# Patient Record
Sex: MALE | Race: BLACK OR AFRICAN AMERICAN | Employment: OTHER | URBAN - METROPOLITAN AREA
[De-identification: names, ages, dates, MRNs, and addresses within clinical notes are randomized per-mention and may not be internally consistent; named-entity substitution may affect disease eponyms.]

---

## 2018-08-20 DIAGNOSIS — C61 PROSTATE CA (HCC): ICD-10-CM

## 2018-08-20 DIAGNOSIS — I70.90 ATHEROSCLEROTIC VASCULAR DISEASE: ICD-10-CM

## 2018-08-20 DIAGNOSIS — G47.33 OSA (OBSTRUCTIVE SLEEP APNEA): ICD-10-CM

## 2018-08-20 DIAGNOSIS — J61 ASBESTOSIS (HCC): ICD-10-CM

## 2018-08-20 RX ORDER — SIMVASTATIN 20 MG
20 TABLET ORAL NIGHTLY
COMMUNITY
End: 2018-08-22 | Stop reason: ALTCHOICE

## 2018-08-20 RX ORDER — LOSARTAN POTASSIUM 25 MG/1
25 TABLET ORAL DAILY
COMMUNITY

## 2018-08-20 RX ORDER — ANTIOX #8/OM3/DHA/EPA/LUT/ZEAX 250-2.5 MG
CAPSULE ORAL 2 TIMES DAILY
COMMUNITY

## 2018-08-20 RX ORDER — TROSPIUM CHLORIDE 20 MG/1
20 TABLET, FILM COATED ORAL 2 TIMES DAILY
COMMUNITY
End: 2018-08-22 | Stop reason: ALTCHOICE

## 2018-08-20 RX ORDER — MULTIVIT WITH MINERALS/LUTEIN
TABLET ORAL DAILY
COMMUNITY
End: 2018-08-22 | Stop reason: SDUPTHER

## 2018-08-20 RX ORDER — CARVEDILOL 12.5 MG/1
12.5 TABLET ORAL 2 TIMES DAILY
COMMUNITY

## 2018-08-20 RX ORDER — ASPIRIN 81 MG/1
81 TABLET ORAL DAILY
COMMUNITY
End: 2018-08-22 | Stop reason: SDUPTHER

## 2018-08-21 PROBLEM — K92.2 GI BLEEDING: Status: ACTIVE | Noted: 2018-08-21

## 2018-08-21 PROBLEM — I25.810 CAD (CORONARY ARTERY DISEASE) OF ARTERY BYPASS GRAFT: Status: ACTIVE | Noted: 2018-08-21

## 2018-08-22 ENCOUNTER — OFFICE VISIT (OUTPATIENT)
Dept: CARDIOLOGY CLINIC | Age: 83
End: 2018-08-22
Payer: MEDICARE

## 2018-08-22 VITALS
OXYGEN SATURATION: 98 % | RESPIRATION RATE: 14 BRPM | WEIGHT: 195.2 LBS | HEIGHT: 68 IN | DIASTOLIC BLOOD PRESSURE: 62 MMHG | SYSTOLIC BLOOD PRESSURE: 128 MMHG | BODY MASS INDEX: 29.58 KG/M2 | HEART RATE: 75 BPM

## 2018-08-22 DIAGNOSIS — I25.118 CORONARY ARTERY DISEASE OF NATIVE ARTERY OF NATIVE HEART WITH STABLE ANGINA PECTORIS (HCC): ICD-10-CM

## 2018-08-22 DIAGNOSIS — I25.708 CORONARY ARTERY DISEASE OF BYPASS GRAFT OF NATIVE HEART WITH STABLE ANGINA PECTORIS (HCC): Primary | ICD-10-CM

## 2018-08-22 DIAGNOSIS — R06.02 SHORTNESS OF BREATH: ICD-10-CM

## 2018-08-22 DIAGNOSIS — K92.2 GASTROINTESTINAL HEMORRHAGE, UNSPECIFIED GASTROINTESTINAL HEMORRHAGE TYPE: ICD-10-CM

## 2018-08-22 PROCEDURE — 99214 OFFICE O/P EST MOD 30 MIN: CPT | Performed by: INTERNAL MEDICINE

## 2018-08-22 RX ORDER — ATORVASTATIN CALCIUM 40 MG/1
40 TABLET, FILM COATED ORAL NIGHTLY
COMMUNITY

## 2018-08-22 RX ORDER — VARDENAFIL HYDROCHLORIDE 20 MG/1
20 TABLET ORAL
COMMUNITY
End: 2018-08-22 | Stop reason: ALTCHOICE

## 2018-08-22 RX ORDER — MECLIZINE HCL 12.5 MG/1
25 TABLET ORAL 3 TIMES DAILY
COMMUNITY

## 2018-08-22 ASSESSMENT — ENCOUNTER SYMPTOMS
ABDOMINAL PAIN: 0
EYE DISCHARGE: 0
ABDOMINAL DISTENTION: 0
BLOOD IN STOOL: 0
ANAL BLEEDING: 0
BACK PAIN: 0
COLOR CHANGE: 0
EYE PAIN: 0

## 2018-08-22 NOTE — ASSESSMENT & PLAN NOTE
Currently smokes 2 cigars per day. Alcohol: Does not drink alcohol and never has. Substance Abuse History:  No history of illicit substance use. Mental Health History:  NEGATIVE    Current Problems:  -CAD-C-11/18/02-LM stenosis, [place] to LAD patent, SVG to Intermediate,-90% stenosis, SVG to OM2-patent, RCA-20% stenosis  Anemia Post op On iron no  Asbestosis MENA neg  Bilateral Cataract surgery  Blood in stool, 3/14/07 H Pylori Dr Daisy Moser  CAD Cath 11/02 EF40%, SVG IM 90% PTCA with stent. Other grafts OK. EF Echo 50% ETT 5/09 Unchanged. CAD ETT 5/09 Lateral scar, No sig ischemia., FU 1/11 Neg , EF 56%  CAD PTCA Stent ADAN SVG HL, 1/03 LDL 2015 64  Carotid artery stenosis Mild ? Lt subc stenosis  GI Bleed 3/07 Jessi Moser  Malignant prostate nodule  Prostate cancer Dr Lakisha Moreno  Sleep apnea, CPAP Dr Mark Tejeda    Allergies:  No Known Drug Allergies. Current Medications:  Meds Reviewed  Losartan 25mg Tablets 1 tab(s) po qd  Atorvastatin Calcium 40mg Tablet 1 tab(s) po daily  Centrum Silver one po daily  stool softener  Carvedilol 12.5mg Tablets 1 po bid  Vesicare 5mg Dr Lakisha Moreno. Tab take in AM with Meal  Aspirin 81mg Chewable Tablet 1 qd  Nitroglycerin 0.4mg Tablets, Sublingual 1 tab(s) sl may repeat every 5 minutes. Maximum of 3 doses in 15 minutes    OBJECTIVE:    Exams:  PHYSICAL EXAM:  GENERAL: no apparent distress;  NECK: range of motion is normal; carotid exam is normal with good upstroke and no bruits;  RESPIRATORY: normal breath sounds with no rales, rhonchi, wheezes or rubs;  CARDIOVASCULAR: normal rate; rhythm is regular; an S4 gallop is noted; no systolic murmur;  Peripheral Pulses: carotid: 3+ L and 3+ R; femoral: bruit on left and 2+ R; posterior tibial: 1+ L and 1+ R; no edema;  NEUROLOGIC: CN, motor and sensory without apparent gross deficit. ASSESSMENT:    414.01 CAD Cath 11/02 EF40%, SVG IM 90% PTCA with stent. Other grafts OK. EF Echo 50% ETT 5/09 Unchanged.     PLAN:    CAD Cath 11/02 EF40%, 2001  Better with Meclizine. Post CABG CAD stable  Plan: # Continue all medications, Do not stop or change medications without informing our office first.  # Risk factor modification: Need to stay on low cholestrol diet, follow your Cholestrol level ( LDL Bad Cholestrol) Need to keep this below 70, Walk at least 30 minutes a day. # Caffeine , Alcohol and Smoking is particularly harmful for your heart condition. A follow up appointment is given at the time of your visit, Please keep regular appointments, All heart conditions are chronic by natures so requires routine followups. Please go to ER or call our office if any change in your symptoms . Lipids are being followed by PCP, Patient states last test was OK.

## 2018-08-22 NOTE — PROGRESS NOTES
 GI bleeding       Medications Discontinued During This Encounter   Medication Reason    aspirin 81 MG EC tablet DUPLICATE    Multiple Vitamins-Minerals (CENTRUM SILVER) TABS DUPLICATE    Mirabegron ER 25 MG TB24 Therapy completed    simvastatin (ZOCOR) 20 MG tablet Alternate therapy    trospium (SANCTURA) 20 MG tablet Therapy completed    vardenafil (LEVITRA) 20 MG tablet Therapy completed       Modified Medications    No medications on file       No orders of the defined types were placed in this encounter. Assessment/Plan:     Diagnosis Orders   1. Shortness of breath     2. Coronary artery disease of native artery of native heart with stable angina pectoris ([facility] [address]     3. Coronary artery disease of bypass graft of native heart with stable angina pectoris ([facility] [address]     4. Gastrointestinal hemorrhage, unspecified gastrointestinal hemorrhage type        RODGERS possible angina equivalent Known CAD will do stress test.  Coronary anatomy and symptoms related to CAD /Atherosclerosis were discussed in detail. Patient expressed understanding of these issues. Risk factors for atherosclerosis and modification explained in detail. Laboratory data explained. Importance of heart healthy diet discussed again. Daily walk for 30 minutes or 180 minutes a week strongly recommended. Patient must report any change in functional capacity as explained. Palpitation if any must sit down , check BP and HR , if any associated symptoms or symptom persist must go to the ER. Use of nitrostat explained. Patient must maintain close follow up with PCP and discuss further cardiac and non cardiac issues with PCP. Patient must maintain regular and PRN follow up in our clinic. [unfilled] /family was allowed adequate time to ask questions. No Follow-up on file.       Electronically signed by Oliva Coreas MD on 8/22/2018 at 10:32 AM

## 2018-08-31 ENCOUNTER — HOSPITAL ENCOUNTER (OUTPATIENT)
Dept: NON INVASIVE DIAGNOSTICS | Age: 83
Discharge: HOME | End: 2018-08-31
Payer: MEDICARE

## 2018-08-31 ENCOUNTER — HOSPITAL ENCOUNTER (OUTPATIENT)
Dept: NUCLEAR MEDICINE | Age: 83
Discharge: HOME | End: 2018-09-02
Payer: MEDICARE

## 2018-08-31 VITALS — DIASTOLIC BLOOD PRESSURE: 78 MMHG | HEART RATE: 109 BPM | SYSTOLIC BLOOD PRESSURE: 122 MMHG

## 2018-08-31 DIAGNOSIS — R06.02 SHORTNESS OF BREATH: ICD-10-CM

## 2018-08-31 DIAGNOSIS — I25.118 CORONARY ARTERY DISEASE OF NATIVE ARTERY OF NATIVE HEART WITH STABLE ANGINA PECTORIS (HCC): ICD-10-CM

## 2018-08-31 DIAGNOSIS — I25.708 CORONARY ARTERY DISEASE OF BYPASS GRAFT OF NATIVE HEART WITH STABLE ANGINA PECTORIS (HCC): ICD-10-CM

## 2018-08-31 PROCEDURE — A9502 TC99M TETROFOSMIN: HCPCS | Performed by: INTERNAL MEDICINE

## 2018-08-31 PROCEDURE — 3430000000 HC RX DIAGNOSTIC RADIOPHARMACEUTICAL: Performed by: INTERNAL MEDICINE

## 2018-08-31 PROCEDURE — 6360000004 HC RX CONTRAST MEDICATION: Performed by: INTERNAL MEDICINE

## 2018-08-31 PROCEDURE — 93017 CV STRESS TEST TRACING ONLY: CPT

## 2018-08-31 PROCEDURE — 78452 HT MUSCLE IMAGE SPECT MULT: CPT

## 2018-08-31 PROCEDURE — 2580000003 HC RX 258: Performed by: INTERNAL MEDICINE

## 2018-08-31 RX ORDER — SODIUM CHLORIDE 0.9 % (FLUSH) 0.9 %
10 SYRINGE (ML) INJECTION PRN
Status: DISCONTINUED | OUTPATIENT
Start: 2018-08-31 | End: 2018-09-03 | Stop reason: HOSPADM

## 2018-08-31 RX ORDER — SODIUM CHLORIDE 0.9 % (FLUSH) 0.9 %
10 SYRINGE (ML) INJECTION 2 TIMES DAILY
Status: DISCONTINUED | OUTPATIENT
Start: 2018-08-31 | End: 2018-09-03 | Stop reason: HOSPADM

## 2018-08-31 RX ADMIN — TETROFOSMIN 11.1 MILLICURIE: 0.23 INJECTION, POWDER, LYOPHILIZED, FOR SOLUTION INTRAVENOUS at 08:36

## 2018-08-31 RX ADMIN — Medication 10 ML: at 10:19

## 2018-08-31 RX ADMIN — Medication 10 ML: at 10:17

## 2018-08-31 RX ADMIN — REGADENOSON 0.4 MG: 0.08 INJECTION, SOLUTION INTRAVENOUS at 10:16

## 2018-08-31 RX ADMIN — TETROFOSMIN 31.1 MILLICURIE: 0.23 INJECTION, POWDER, LYOPHILIZED, FOR SOLUTION INTRAVENOUS at 10:18

## 2018-08-31 RX ADMIN — Medication 10 ML: at 08:37

## 2018-11-21 ENCOUNTER — OFFICE VISIT (OUTPATIENT)
Dept: CARDIOLOGY CLINIC | Age: 83
End: 2018-11-21
Payer: MEDICARE

## 2018-11-21 VITALS
BODY MASS INDEX: 29.61 KG/M2 | SYSTOLIC BLOOD PRESSURE: 130 MMHG | HEART RATE: 74 BPM | HEIGHT: 68 IN | DIASTOLIC BLOOD PRESSURE: 70 MMHG | WEIGHT: 195.4 LBS

## 2018-11-21 DIAGNOSIS — R06.02 SHORTNESS OF BREATH: ICD-10-CM

## 2018-11-21 DIAGNOSIS — I25.10 CORONARY ARTERY DISEASE INVOLVING NATIVE CORONARY ARTERY OF NATIVE HEART, ANGINA PRESENCE UNSPECIFIED: Primary | ICD-10-CM

## 2018-11-21 DIAGNOSIS — I25.709 CORONARY ARTERY DISEASE INVOLVING CORONARY BYPASS GRAFT OF NATIVE HEART WITH ANGINA PECTORIS (HCC): ICD-10-CM

## 2018-11-21 PROCEDURE — 93000 ELECTROCARDIOGRAM COMPLETE: CPT | Performed by: INTERNAL MEDICINE

## 2018-11-21 PROCEDURE — 99213 OFFICE O/P EST LOW 20 MIN: CPT | Performed by: INTERNAL MEDICINE

## 2018-11-21 ASSESSMENT — ENCOUNTER SYMPTOMS
EYE DISCHARGE: 0
BLOOD IN STOOL: 0
COLOR CHANGE: 0
EYE PAIN: 0
ANAL BLEEDING: 0
ABDOMINAL PAIN: 0
BACK PAIN: 0
ABDOMINAL DISTENTION: 0

## 2018-11-21 NOTE — PROGRESS NOTES
CREATININE, GFRAA, AGRATIO, LABGLOM, GLUCOSE, PROT, LABALBU, CALCIUM, BILITOT, ALKPHOS, AST, ALT  BMP:  No results found for: NA, K, CL, CO2, BUN, LABALBU, CREATININE, CALCIUM, GFRAA, LABGLOM, GLUCOSE  Magnesium:  No results found for: MG  TSH:No results found for: TSHFT4, TSH      Patient Active Problem List   Diagnosis    CAD (coronary artery disease)    Atherosclerotic vascular disease    Asbestosis ([facility] [address]    Prostate CA ([facility]    RADHA (obstructive sleep apnea)    CAD (coronary artery disease) of artery bypass graft    GI bleeding       Assessment/Plan:    1. Coronary artery disease involving native coronary artery of native heart, angina presence unspecified    - EKG 12 Lead      Counseling:  Coronary anatomy and symptoms related to CAD /Atherosclerosis were discussed in detail. Patient expressed understanding of these issues. Risk factors for atherosclerosis and modification explained in detail. Laboratory data explained. Importance of heart healthy diet discussed again. Daily walk for 30 minutes or 180 minutes a week strongly recommended. Patient must report any change in functional capacity as explained. Palpitation if any must sit down , check BP and HR , if any associated symptoms or symptom persist must go to the ER. Use of nitrostat explained. Patient must maintain close follow up with PCP and discuss further cardiac and non cardiac issues with PCP. Patient must maintain regular and PRN follow up in our clinic. Zulema Prim /family was allowed adequate time to ask questions. Return in about 5 months (around 4/21/2019).     Electronically signed by Nusrat Neal MD on 11/21/2018 at 11:11 AM

## 2019-04-22 ENCOUNTER — OFFICE VISIT (OUTPATIENT)
Dept: CARDIOLOGY CLINIC | Age: 84
End: 2019-04-22
Payer: MEDICARE

## 2019-04-22 VITALS
WEIGHT: 195 LBS | OXYGEN SATURATION: 97 % | DIASTOLIC BLOOD PRESSURE: 80 MMHG | HEIGHT: 68 IN | BODY MASS INDEX: 29.55 KG/M2 | SYSTOLIC BLOOD PRESSURE: 130 MMHG | HEART RATE: 80 BPM

## 2019-04-22 DIAGNOSIS — R06.02 SHORTNESS OF BREATH: ICD-10-CM

## 2019-04-22 DIAGNOSIS — I25.10 CORONARY ARTERY DISEASE INVOLVING NATIVE CORONARY ARTERY OF NATIVE HEART, ANGINA PRESENCE UNSPECIFIED: Primary | ICD-10-CM

## 2019-04-22 DIAGNOSIS — G47.33 OSA (OBSTRUCTIVE SLEEP APNEA): ICD-10-CM

## 2019-04-22 PROCEDURE — 99213 OFFICE O/P EST LOW 20 MIN: CPT | Performed by: INTERNAL MEDICINE

## 2019-04-22 ASSESSMENT — ENCOUNTER SYMPTOMS
ANAL BLEEDING: 1
SHORTNESS OF BREATH: 1
APNEA: 0
CHEST TIGHTNESS: 0

## 2019-04-22 NOTE — PROGRESS NOTES
attempt to quit: 1998     Years since quittin.6    Smokeless tobacco: Never Used   Substance and Sexual Activity    Alcohol use: No    Drug use: Not on file    Sexual activity: Not on file   Lifestyle    Physical activity:     Days per week: Not on file     Minutes per session: Not on file    Stress: Not on file   Relationships    Social connections:     Talks on phone: Not on file     Gets together: Not on file     Attends Faith service: Not on file     Active member of club or organization: Not on file     Attends meetings of clubs or organizations: Not on file     Relationship status: Not on file    Intimate partner violence:     Fear of current or ex partner: Not on file     Emotionally abused: Not on file     Physically abused: Not on file     Forced sexual activity: Not on file   Other Topics Concern    Not on file   Social History Narrative    Not on file       No Known Allergies    Current Outpatient Medications   Medication Sig Dispense Refill    aspirin 81 MG tablet Take 81 mg by mouth daily      atorvastatin (LIPITOR) 40 MG tablet Take 40 mg by mouth nightly      meclizine (ANTIVERT) 12.5 MG tablet Take 25 mg by mouth 3 times daily      Multiple Vitamins-Minerals (CENTRUM SILVER PO) Take 1 tablet by mouth      carvedilol (COREG) 12.5 MG tablet Take 12.5 mg by mouth 2 times daily      losartan (COZAAR) 25 MG tablet Take 25 mg by mouth daily      Multiple Vitamins-Minerals (PRESERVISION AREDS 2) CAPS Take by mouth 2 times daily       No current facility-administered medications for this visit. Review of Systems:   Review of Systems   Constitutional: Negative for appetite change, diaphoresis and fatigue. HENT: Negative. Respiratory: Positive for shortness of breath (if rushing). Negative for apnea and chest tightness. Cardiovascular: Negative for chest pain, palpitations and leg swelling. Gastrointestinal: Positive for anal bleeding (gi consult pending. ). Genitourinary: Positive for enuresis (spirnak). Musculoskeletal: Positive for arthralgias (knee problem, possible rt knee Dr Dax Truong). Skin: Negative. Neurological: Negative for dizziness, syncope, weakness, light-headedness and headaches. Psychiatric/Behavioral: Negative for agitation, behavioral problems, confusion and decreased concentration. The patient is not nervous/anxious and is not hyperactive. All other systems reviewed and are negative. Physical Examination:    /80 (Site: Left Upper Arm, Position: Sitting, Cuff Size: Large Adult)   Pulse 80   Ht 5' 8\" (1.727 m)   Wt 195 lb (88.5 kg)   SpO2 97%   BMI 29.65 kg/m²    Physical Exam   Constitutional: He appears healthy. No distress. HENT:   Nose: Nose normal. No nasal discharge. Eyes: Pupils are equal, round, and reactive to light. Conjunctivae are normal.   Neck: Normal range of motion and thyroid normal. Neck supple. No JVD present. No neck adenopathy. No thyromegaly present. Cardiovascular: Normal rate, regular rhythm, S1 normal, S2 normal and intact distal pulses. Occasional extrasystoles are present. PMI is not displaced. Exam reveals no gallop, no S4, no distant heart sounds, no friction rub and no midsystolic click. No murmur heard. Pulses:       Carotid pulses are 2+ on the right side, and 2+ on the left side. Posterior tibial pulses are 1+ on the right side, and 1+ on the left side. Pulmonary/Chest: Effort normal and breath sounds normal. He exhibits no tenderness. Abdominal: Soft. There is no tenderness. Musculoskeletal: He exhibits no tenderness or deformity. Neurological: He is alert and oriented to person, place, and time. He has normal motor skills and intact cranial nerves. Skin: No cyanosis. No jaundice or plethora. Nails show no clubbing.            Patient Active Problem List   Diagnosis    CAD (coronary artery disease)    Atherosclerotic vascular disease    Asbestosis ([facility] [address]    Prostate CA ([facility] [address]    RADHA (obstructive sleep apnea)    CAD (coronary artery disease) of artery bypass graft    GI bleeding         No orders of the defined types were placed in this encounter. Assessment/Plan:    1. Coronary artery disease involving native coronary artery of native heart, angina presence unspecified  Post CABG 2002    2. RADHA (obstructive sleep apnea)  CPAP Dr Kati Sanchez    3. Shortness of breath         Counseling:  Coronary anatomy and symptoms related to CAD /Atherosclerosis were discussed in detail. Patient expressed understanding of these issues. Risk factors for atherosclerosis and modification explained in detail. Laboratory data explained. Importance of heart healthy diet discussed again. Daily walk for 30 minutes or 180 minutes a week strongly recommended. Patient must report any change in functional capacity as explained. Palpitation if any must sit down , check BP and HR , if any associated symptoms or symptom persist must go to the ER. Use of nitrostat explained. Patient must maintain close follow up with PCP and discuss further cardiac and non cardiac issues with PCP. Patient must maintain regular and PRN follow up in our clinic. [place] Ekaterina /family was allowed adequate time to ask questions. Return in about 6 months (around 10/22/2019).   Electronically signed by Jose Thomas MD on 4/22/2019 at 10:44 AM          (Please note that portions of this note were completed with a voice recognition program.  Efforts were made to edit the dictations but occasionally words are mis-transcribed.)

## 2019-10-25 ENCOUNTER — OFFICE VISIT (OUTPATIENT)
Dept: CARDIOLOGY CLINIC | Age: 84
End: 2019-10-25
Payer: MEDICARE

## 2019-10-25 VITALS
WEIGHT: 197 LBS | DIASTOLIC BLOOD PRESSURE: 66 MMHG | SYSTOLIC BLOOD PRESSURE: 112 MMHG | OXYGEN SATURATION: 99 % | HEART RATE: 72 BPM | RESPIRATION RATE: 16 BRPM | BODY MASS INDEX: 29.95 KG/M2

## 2019-10-25 DIAGNOSIS — I25.10 CORONARY ARTERY DISEASE INVOLVING NATIVE CORONARY ARTERY OF NATIVE HEART, ANGINA PRESENCE UNSPECIFIED: Primary | ICD-10-CM

## 2019-10-25 DIAGNOSIS — I70.90 ATHEROSCLEROTIC VASCULAR DISEASE: ICD-10-CM

## 2019-10-25 PROCEDURE — 99214 OFFICE O/P EST MOD 30 MIN: CPT | Performed by: INTERNAL MEDICINE

## 2019-10-25 ASSESSMENT — ENCOUNTER SYMPTOMS
SHORTNESS OF BREATH: 0
VOMITING: 0
NAUSEA: 0
APNEA: 0
ABDOMINAL DISTENTION: 0
COLOR CHANGE: 0
DIARRHEA: 0
CHEST TIGHTNESS: 0

## 2020-01-27 ENCOUNTER — TELEPHONE (OUTPATIENT)
Dept: CARDIOLOGY CLINIC | Age: 85
End: 2020-01-27

## 2020-01-28 RX ORDER — NITROGLYCERIN 0.4 MG/1
0.4 TABLET SUBLINGUAL EVERY 5 MIN PRN
COMMUNITY
End: 2020-01-28 | Stop reason: SDUPTHER

## 2020-01-28 RX ORDER — NITROGLYCERIN 0.4 MG/1
0.4 TABLET SUBLINGUAL EVERY 5 MIN PRN
Qty: 25 TABLET | Refills: 1 | Status: SHIPPED | OUTPATIENT
Start: 2020-01-28 | End: 2020-02-21

## 2020-02-21 RX ORDER — NITROGLYCERIN 0.4 MG/1
TABLET SUBLINGUAL
Qty: 25 TABLET | Refills: 3 | Status: SHIPPED | OUTPATIENT
Start: 2020-02-21

## 2021-06-23 NOTE — TELEPHONE ENCOUNTER
requesting medication refill. Please approve or deny this request.    Rx requested:  Requested Prescriptions      No prescriptions requested or ordered in this encounter     Nitro. .. Last Office Visit:   10/25/2019      Next Visit Date:  No future appointments.

## 2023-02-14 PROBLEM — E78.2 MIXED HYPERLIPIDEMIA: Status: ACTIVE | Noted: 2023-02-14

## 2023-02-14 PROBLEM — M25.569 JOINT PAIN, KNEE: Status: ACTIVE | Noted: 2023-02-14

## 2023-02-14 PROBLEM — D47.2 MGUS (MONOCLONAL GAMMOPATHY OF UNKNOWN SIGNIFICANCE): Status: ACTIVE | Noted: 2023-02-14

## 2023-02-14 PROBLEM — I25.10 ARTERIOSCLEROTIC CARDIOVASCULAR DISEASE: Status: ACTIVE | Noted: 2023-02-14

## 2023-02-14 PROBLEM — Z96.651 STATUS POST TOTAL RIGHT KNEE REPLACEMENT: Status: ACTIVE | Noted: 2023-02-14

## 2023-02-14 PROBLEM — J61 ASBESTOSIS (MULTI): Status: ACTIVE | Noted: 2023-02-14

## 2023-02-14 PROBLEM — I10 BENIGN ESSENTIAL HYPERTENSION: Status: ACTIVE | Noted: 2023-02-14

## 2023-02-14 PROBLEM — C88.4: Status: ACTIVE | Noted: 2023-02-14

## 2023-02-14 PROBLEM — D64.9 ANEMIA: Status: ACTIVE | Noted: 2023-02-14

## 2023-02-14 PROBLEM — I49.3 FREQUENT PVCS: Status: ACTIVE | Noted: 2023-02-14

## 2023-02-14 PROBLEM — Z95.1 S/P CABG X 3: Status: ACTIVE | Noted: 2023-02-14

## 2023-02-14 PROBLEM — C61 MALIGNANT NEOPLASM OF PROSTATE (MULTI): Status: ACTIVE | Noted: 2023-02-14

## 2023-02-14 PROBLEM — S61.213D: Status: ACTIVE | Noted: 2023-02-14

## 2023-02-14 PROBLEM — N18.31 STAGE 3A CHRONIC KIDNEY DISEASE (MULTI): Status: ACTIVE | Noted: 2023-02-14

## 2023-02-14 PROBLEM — C88.40 MALT (MUCOSA ASSOCIATED LYMPHOID TISSUE): Status: ACTIVE | Noted: 2023-02-14

## 2023-02-14 PROBLEM — G47.33 OBSTRUCTIVE SLEEP APNEA SYNDROME: Status: ACTIVE | Noted: 2023-02-14

## 2023-02-14 RX ORDER — CARVEDILOL 12.5 MG/1
1 TABLET ORAL 2 TIMES DAILY
COMMUNITY
Start: 2019-05-01 | End: 2023-04-27 | Stop reason: SDUPTHER

## 2023-02-14 RX ORDER — LOSARTAN POTASSIUM 25 MG/1
1 TABLET ORAL DAILY
COMMUNITY
Start: 2019-05-01 | End: 2023-05-08 | Stop reason: SDUPTHER

## 2023-02-14 RX ORDER — ATORVASTATIN CALCIUM 40 MG/1
1 TABLET, FILM COATED ORAL NIGHTLY
COMMUNITY
Start: 2019-05-01 | End: 2024-01-16 | Stop reason: SDUPTHER

## 2023-02-14 RX ORDER — NITROGLYCERIN 0.4 MG/1
0.4 TABLET SUBLINGUAL EVERY 5 MIN PRN
COMMUNITY
Start: 2019-05-01

## 2023-02-14 RX ORDER — MIRABEGRON 25 MG/1
25 TABLET, FILM COATED, EXTENDED RELEASE ORAL DAILY
COMMUNITY
Start: 2019-05-01 | End: 2024-05-06 | Stop reason: SDUPTHER

## 2023-02-14 RX ORDER — ASPIRIN 81 MG/1
81 TABLET ORAL DAILY
COMMUNITY
Start: 2019-05-01

## 2023-02-14 RX ORDER — MECLIZINE HCL 12.5 MG 12.5 MG/1
1 TABLET ORAL 3 TIMES DAILY PRN
COMMUNITY
Start: 2019-05-01

## 2023-03-09 ENCOUNTER — OFFICE VISIT (OUTPATIENT)
Dept: PRIMARY CARE | Facility: CLINIC | Age: 88
End: 2023-03-09
Payer: MEDICARE

## 2023-03-09 VITALS
WEIGHT: 179 LBS | TEMPERATURE: 96 F | HEIGHT: 67 IN | HEART RATE: 56 BPM | DIASTOLIC BLOOD PRESSURE: 78 MMHG | SYSTOLIC BLOOD PRESSURE: 113 MMHG | BODY MASS INDEX: 28.09 KG/M2

## 2023-03-09 DIAGNOSIS — I50.21 ACUTE SYSTOLIC CONGESTIVE HEART FAILURE (MULTI): Primary | ICD-10-CM

## 2023-03-09 DIAGNOSIS — I25.10 ARTERIOSCLEROTIC CARDIOVASCULAR DISEASE: ICD-10-CM

## 2023-03-09 DIAGNOSIS — I10 BENIGN ESSENTIAL HYPERTENSION: ICD-10-CM

## 2023-03-09 PROBLEM — Z96.651 STATUS POST TOTAL RIGHT KNEE REPLACEMENT: Status: RESOLVED | Noted: 2023-02-14 | Resolved: 2023-03-09

## 2023-03-09 PROBLEM — M25.569 JOINT PAIN, KNEE: Status: RESOLVED | Noted: 2023-02-14 | Resolved: 2023-03-09

## 2023-03-09 PROBLEM — I49.3 FREQUENT PVCS: Status: RESOLVED | Noted: 2023-02-14 | Resolved: 2023-03-09

## 2023-03-09 PROBLEM — S61.213D: Status: RESOLVED | Noted: 2023-02-14 | Resolved: 2023-03-09

## 2023-03-09 PROBLEM — D64.9 ANEMIA: Status: RESOLVED | Noted: 2023-02-14 | Resolved: 2023-03-09

## 2023-03-09 PROCEDURE — 3078F DIAST BP <80 MM HG: CPT | Performed by: INTERNAL MEDICINE

## 2023-03-09 PROCEDURE — 1160F RVW MEDS BY RX/DR IN RCRD: CPT | Performed by: INTERNAL MEDICINE

## 2023-03-09 PROCEDURE — 1159F MED LIST DOCD IN RCRD: CPT | Performed by: INTERNAL MEDICINE

## 2023-03-09 PROCEDURE — 1157F ADVNC CARE PLAN IN RCRD: CPT | Performed by: INTERNAL MEDICINE

## 2023-03-09 PROCEDURE — 99213 OFFICE O/P EST LOW 20 MIN: CPT | Performed by: INTERNAL MEDICINE

## 2023-03-09 PROCEDURE — 3074F SYST BP LT 130 MM HG: CPT | Performed by: INTERNAL MEDICINE

## 2023-03-09 PROCEDURE — 1036F TOBACCO NON-USER: CPT | Performed by: INTERNAL MEDICINE

## 2023-03-09 RX ORDER — FUROSEMIDE 40 MG/1
1 TABLET ORAL DAILY
Qty: 30 TABLET | Refills: 2 | COMMUNITY
Start: 2023-03-01 | End: 2023-03-27 | Stop reason: SDUPTHER

## 2023-03-09 ASSESSMENT — ENCOUNTER SYMPTOMS
SHORTNESS OF BREATH: 1
FATIGUE: 1
UNEXPECTED WEIGHT CHANGE: 0
ABDOMINAL PAIN: 0
CHEST PRESSURE: 0
PALPITATIONS: 0
EDEMA: 0
ORTHOPNEA: 0

## 2023-03-09 NOTE — PROGRESS NOTES
"Subjective   Patient ID: Brian Lennon Jr. is a 87 y.o. male who presents for Follow-up (Hospital follow up).    Patient was found to have a systolic heart failure, hospitalization data were reviewed, ejection fraction was 30%, patient has been started on Lasix therapy.  He has felt better but this is a new diagnosis, patient has history of CABG, patient has history of coronary artery disease.  Patient is a survivor of prostate cancer, echocardiography, laboratories, BNP were reviewed.    Congestive Heart Failure  Presents for follow-up visit. Associated symptoms include fatigue and shortness of breath. Pertinent negatives include no abdominal pain, chest pain, chest pressure, edema, orthopnea, palpitations, paroxysmal nocturnal dyspnea or unexpected weight change. The symptoms have been stable. Compliance with total regimen is %. Compliance problems include adherence to diet.  Compliance with diet is %.   Atherosclerotic Disease  Patient is a 87 y.o. male who presents for follow-up of atherosclerotic coronary artery disease. Recent history: taking medications as instructed, no medication side effects noted, no TIA's, no chest pain on exertion, no dyspnea on exertion, and no swelling of ankles. Patient's symptoms have been stable. Medication side effects include: none.    The following portions of the chart were reviewed this encounter and updated as appropriate:  Tobacco  Allergies  Meds  Problems  Med Hx  Surg Hx  Fam Hx          Review of Systems   Constitutional:  Positive for fatigue. Negative for unexpected weight change.   Respiratory:  Positive for shortness of breath.    Cardiovascular:  Negative for chest pain and palpitations.   Gastrointestinal:  Negative for abdominal pain.       Objective   /78 (BP Location: Right arm, Patient Position: Sitting, BP Cuff Size: Adult)   Pulse 56   Temp 35.6 °C (96 °F) (Temporal)   Ht 1.689 m (5' 6.5\")   Wt 81.2 kg (179 lb)   BMI 28.46 kg/m² "     Physical Exam  Vitals reviewed.   Constitutional:       Appearance: Normal appearance. He is normal weight.   HENT:      Head: Normocephalic and atraumatic.   Eyes:      Conjunctiva/sclera: Conjunctivae normal.   Cardiovascular:      Rate and Rhythm: Normal rate and regular rhythm.      Pulses: Normal pulses.   Pulmonary:      Effort: Pulmonary effort is normal.      Breath sounds: Normal breath sounds.   Abdominal:      Palpations: Abdomen is soft.   Musculoskeletal:         General: Normal range of motion.      Cervical back: Normal range of motion.   Skin:     General: Skin is warm and dry.         Assessment/Plan   Problem List Items Addressed This Visit          Circulatory    Arteriosclerotic cardiovascular disease    Benign essential hypertension     Other Visit Diagnoses       Acute systolic congestive heart failure (CMS/HCC)    -  Primary          Pt has CHF, diastolic or systolic were specified, usually three times  a week weight monitoring, salt restriction up to 2 GM Na diet, fluid restriction and continuation of therapy as recommended to be continued. HOB can be kept somewhat elevated at night. Any dyspnea or more then 5 lb weight gain or leg swelling need to be notified, please call if any of above sympts happen and pt will be addressed if possible on outpatient setting. Light exercises are always beneficial with fresh air and deep breathing exercises. Please follow up with us as directed.  Pt does not have any angina lately, aware of using NTG if needed, aware to notify MD if any new chest pains happens. Compliance is appropriate. Statin therapy reviewed,   Recent hospitalization data and information reviewed, all reports, labs, radiological investigations and consultations and follow ups reviewed during this visit. Pt is doing well, precautions to be taken in the future for acute ailments or acute illness and change of condition are discussed, will continue to have close follow up, medication  list was reviewed and updated and necessary changes were applied.

## 2023-03-27 ENCOUNTER — OFFICE VISIT (OUTPATIENT)
Dept: PRIMARY CARE | Facility: CLINIC | Age: 88
End: 2023-03-27
Payer: MEDICARE

## 2023-03-27 VITALS
WEIGHT: 179.2 LBS | TEMPERATURE: 96.8 F | SYSTOLIC BLOOD PRESSURE: 120 MMHG | HEART RATE: 69 BPM | DIASTOLIC BLOOD PRESSURE: 80 MMHG | HEIGHT: 66 IN | BODY MASS INDEX: 28.8 KG/M2

## 2023-03-27 DIAGNOSIS — J61 ASBESTOSIS (MULTI): ICD-10-CM

## 2023-03-27 DIAGNOSIS — Z00.00 ROUTINE GENERAL MEDICAL EXAMINATION AT HEALTH CARE FACILITY: Primary | ICD-10-CM

## 2023-03-27 DIAGNOSIS — I50.21 ACUTE SYSTOLIC CONGESTIVE HEART FAILURE (MULTI): ICD-10-CM

## 2023-03-27 PROCEDURE — 1170F FXNL STATUS ASSESSED: CPT | Performed by: INTERNAL MEDICINE

## 2023-03-27 PROCEDURE — 3079F DIAST BP 80-89 MM HG: CPT | Performed by: INTERNAL MEDICINE

## 2023-03-27 PROCEDURE — 99397 PER PM REEVAL EST PAT 65+ YR: CPT | Performed by: INTERNAL MEDICINE

## 2023-03-27 PROCEDURE — 1036F TOBACCO NON-USER: CPT | Performed by: INTERNAL MEDICINE

## 2023-03-27 PROCEDURE — 1159F MED LIST DOCD IN RCRD: CPT | Performed by: INTERNAL MEDICINE

## 2023-03-27 PROCEDURE — 3074F SYST BP LT 130 MM HG: CPT | Performed by: INTERNAL MEDICINE

## 2023-03-27 PROCEDURE — 1157F ADVNC CARE PLAN IN RCRD: CPT | Performed by: INTERNAL MEDICINE

## 2023-03-27 PROCEDURE — G0439 PPPS, SUBSEQ VISIT: HCPCS | Performed by: INTERNAL MEDICINE

## 2023-03-27 PROCEDURE — 1160F RVW MEDS BY RX/DR IN RCRD: CPT | Performed by: INTERNAL MEDICINE

## 2023-03-27 RX ORDER — FUROSEMIDE 40 MG/1
40 TABLET ORAL DAILY
Qty: 90 TABLET | Refills: 1 | Status: SHIPPED | OUTPATIENT
Start: 2023-03-27 | End: 2023-06-13 | Stop reason: SDUPTHER

## 2023-03-27 ASSESSMENT — ACTIVITIES OF DAILY LIVING (ADL)
DRESSING: INDEPENDENT
MANAGING_FINANCES: INDEPENDENT
TAKING_MEDICATION: INDEPENDENT
GROCERY_SHOPPING: INDEPENDENT
DOING_HOUSEWORK: INDEPENDENT
BATHING: INDEPENDENT

## 2023-03-27 ASSESSMENT — ENCOUNTER SYMPTOMS
PSYCHIATRIC NEGATIVE: 1
NECK PAIN: 0
GASTROINTESTINAL NEGATIVE: 1
BACK PAIN: 1
ARTHRALGIAS: 1
RESPIRATORY NEGATIVE: 1
DEPRESSION: 0
CARDIOVASCULAR NEGATIVE: 1
CONSTITUTIONAL NEGATIVE: 1
DIZZINESS: 1
LOSS OF SENSATION IN FEET: 0
OCCASIONAL FEELINGS OF UNSTEADINESS: 0
EYES NEGATIVE: 1

## 2023-03-27 NOTE — PROGRESS NOTES
"Subjective   Reason for Visit: Brian Lennon Jr. is an 87 y.o. male here for a Medicare Wellness visit.     Past Medical, Surgical, and Family History reviewed and updated in chart.    Reviewed all medications by prescribing practitioner or clinical pharmacist (such as prescriptions, OTCs, herbal therapies and supplements) and documented in the medical record.    Wellness exam was completed, he continued to have a pain and discomfort in the legs, he has a bilateral knee replacement, we have done plain radiograph in the past, as expected spondylosis and degenerative changes has been seen, does not have any more weight gain, he has a systolic impairment, he stays on Lasix as needed, he does not have any increasing shortness of breath, he does not have any increasing nocturnal dyspnea.  Patient has been followed by urology on a regular basis, medications are reviewed, cardiology follow-up was reviewed, wellness exam related questions were asked and addressed.        Patient Care Team:  Jason Rodriguez MD as PCP - General     Review of Systems   Constitutional: Negative.    HENT: Negative.     Eyes: Negative.    Respiratory: Negative.     Cardiovascular: Negative.    Gastrointestinal: Negative.    Musculoskeletal:  Positive for arthralgias, back pain and gait problem. Negative for neck pain.   Skin: Negative.    Neurological:  Positive for dizziness.   Psychiatric/Behavioral: Negative.         Objective   Vitals:  /80 (BP Location: Right arm, Patient Position: Sitting, BP Cuff Size: Adult)   Pulse 69   Temp 36 °C (96.8 °F) (Temporal)   Ht 1.681 m (5' 6.2\")   Wt 81.3 kg (179 lb 3.2 oz)   BMI 28.75 kg/m²       Physical Exam  Constitutional:       Appearance: Normal appearance. He is normal weight.   HENT:      Head: Normocephalic.   Eyes:      Conjunctiva/sclera: Conjunctivae normal.   Cardiovascular:      Rate and Rhythm: Normal rate and regular rhythm.      Pulses: Normal pulses.      Heart sounds: Normal " heart sounds.   Pulmonary:      Effort: Pulmonary effort is normal.      Breath sounds: Normal breath sounds.   Abdominal:      General: Abdomen is flat.      Palpations: Abdomen is soft.   Musculoskeletal:         General: Normal range of motion.      Cervical back: Normal range of motion and neck supple.   Skin:     General: Skin is warm and dry.   Neurological:      General: No focal deficit present.      Mental Status: Mental status is at baseline.   Psychiatric:         Mood and Affect: Mood normal.         Judgment: Judgment normal.         Assessment/Plan   Problem List Items Addressed This Visit          Respiratory    Asbestosis (CMS/[facility])       Other    Routine general medical examination at health care facility - Primary   Asbestosis is a longstanding diagnosis this patient has been manifesting.  He is doing well, after having that hospitalization he did not have any more weight gain or shortness of breath, his vaccinations are usually up to date, at the age of 87 he is slowing down, he remains on therapeutics for congestive heart failure, hypertension, coronary artery disease, he has history of CABG.  No major concern, salt restriction needs to be kept, medications will not be disturbed, follow-up in 3 months, wellness exam was completed, living will is up to date.

## 2023-04-11 DIAGNOSIS — M25.562 CHRONIC PAIN OF BOTH KNEES: ICD-10-CM

## 2023-04-11 DIAGNOSIS — G89.29 CHRONIC PAIN OF BOTH KNEES: ICD-10-CM

## 2023-04-11 DIAGNOSIS — M25.561 CHRONIC PAIN OF BOTH KNEES: ICD-10-CM

## 2023-04-11 DIAGNOSIS — M54.9 BACK PAIN, UNSPECIFIED BACK LOCATION, UNSPECIFIED BACK PAIN LATERALITY, UNSPECIFIED CHRONICITY: ICD-10-CM

## 2023-04-27 ENCOUNTER — OFFICE VISIT (OUTPATIENT)
Dept: PRIMARY CARE | Facility: CLINIC | Age: 88
End: 2023-04-27
Payer: MEDICARE

## 2023-04-27 VITALS
HEIGHT: 69 IN | TEMPERATURE: 96.8 F | SYSTOLIC BLOOD PRESSURE: 115 MMHG | BODY MASS INDEX: 26.36 KG/M2 | WEIGHT: 178 LBS | DIASTOLIC BLOOD PRESSURE: 78 MMHG | HEART RATE: 65 BPM

## 2023-04-27 DIAGNOSIS — M48.061 LUMBAR FORAMINAL STENOSIS: Primary | ICD-10-CM

## 2023-04-27 DIAGNOSIS — I10 BENIGN ESSENTIAL HYPERTENSION: ICD-10-CM

## 2023-04-27 DIAGNOSIS — R04.2 HEMOPTYSIS: ICD-10-CM

## 2023-04-27 PROCEDURE — 1036F TOBACCO NON-USER: CPT | Performed by: INTERNAL MEDICINE

## 2023-04-27 PROCEDURE — 1157F ADVNC CARE PLAN IN RCRD: CPT | Performed by: INTERNAL MEDICINE

## 2023-04-27 PROCEDURE — 3078F DIAST BP <80 MM HG: CPT | Performed by: INTERNAL MEDICINE

## 2023-04-27 PROCEDURE — 1160F RVW MEDS BY RX/DR IN RCRD: CPT | Performed by: INTERNAL MEDICINE

## 2023-04-27 PROCEDURE — 99213 OFFICE O/P EST LOW 20 MIN: CPT | Performed by: INTERNAL MEDICINE

## 2023-04-27 PROCEDURE — 1159F MED LIST DOCD IN RCRD: CPT | Performed by: INTERNAL MEDICINE

## 2023-04-27 PROCEDURE — 3074F SYST BP LT 130 MM HG: CPT | Performed by: INTERNAL MEDICINE

## 2023-04-27 RX ORDER — CARVEDILOL 12.5 MG/1
12.5 TABLET ORAL 2 TIMES DAILY
Qty: 90 TABLET | Refills: 3 | Status: SHIPPED | OUTPATIENT
Start: 2023-04-27 | End: 2023-07-17 | Stop reason: SDUPTHER

## 2023-04-27 ASSESSMENT — ENCOUNTER SYMPTOMS
COUGH: 1
NEUROLOGICAL NEGATIVE: 1
BACK PAIN: 1
CONSTITUTIONAL NEGATIVE: 1
EYES NEGATIVE: 1
GASTROINTESTINAL NEGATIVE: 1
CARDIOVASCULAR NEGATIVE: 1
ABDOMINAL PAIN: 0

## 2023-04-27 NOTE — PROGRESS NOTES
"Subjective   Patient ID: Brian Lennon Jr. is a 88 y.o. male who presents for Back Pain (Both legs feel stiff).    Back Pain  This is a recurrent problem. The current episode started more than 1 month ago. The problem occurs 2 to 4 times per day. The problem is unchanged. The pain is present in the lumbar spine. The pain is at a severity of 5/10. The pain is moderate. The pain is Worse during the night. The symptoms are aggravated by bending. Pertinent negatives include no abdominal pain, bladder incontinence or chest pain. The treatment provided no relief.        Review of Systems   Constitutional: Negative.    HENT: Negative.     Eyes: Negative.    Respiratory:  Positive for cough.         Blood in sputum   Cardiovascular: Negative.  Negative for chest pain.   Gastrointestinal: Negative.  Negative for abdominal pain.   Genitourinary:  Negative for bladder incontinence.   Musculoskeletal:  Positive for back pain.   Skin: Negative.    Neurological: Negative.        Objective   /78 (BP Location: Left arm, Patient Position: Sitting, BP Cuff Size: Adult)   Pulse 65   Temp 36 °C (96.8 °F) (Temporal)   Ht 1.753 m (5' 9\")   Wt 80.7 kg (178 lb)   BMI 26.29 kg/m²     Physical Exam  Vitals reviewed.   Constitutional:       Appearance: Normal appearance. He is normal weight.   HENT:      Head: Normocephalic and atraumatic.   Eyes:      Conjunctiva/sclera: Conjunctivae normal.   Cardiovascular:      Rate and Rhythm: Normal rate and regular rhythm.      Pulses: Normal pulses.   Pulmonary:      Effort: Pulmonary effort is normal.      Breath sounds: Normal breath sounds.   Abdominal:      Palpations: Abdomen is soft.   Musculoskeletal:      Cervical back: Normal range of motion.      Comments: Stooped forward, tenderness at L spine in midline, slightly reduced power left lower limb   Skin:     General: Skin is warm and dry.   Neurological:      General: No focal deficit present.   Psychiatric:         Mood and " Affect: Mood normal.       Assessment/Plan   Problem List Items Addressed This Visit          Nervous    Lumbar foraminal stenosis - Primary       Respiratory    Hemoptysis   There are 2 issues going on.  All I have been seeing him for the third time for this back pain and leg pains, plain radiographs show spondylosis, anterolisthesis, he has a slightly reduced strength in the left lower extremity, he has been using walker for mobility, he has been stooped forward, we need to do MRI of LS spine because at least we need a diagnosis and then we can figure out what to do because lumbar stenosis or foraminal stenosis is suspected.  Second problem that he says every morning he has a blood in the sputum, he has a CT of chest in the beginning of February, at that time he was having pulmonary vascular congestion and pleural effusions pulmonary tissues were not assessed fully, as his hemoptysis did not go away and it is a variable we need another CT of chest when he has been diuresed to assess his pulmonary tissues and depends upon the findings he will be directed appropriately.

## 2023-05-08 ENCOUNTER — OFFICE VISIT (OUTPATIENT)
Dept: PRIMARY CARE | Facility: CLINIC | Age: 88
End: 2023-05-08
Payer: MEDICARE

## 2023-05-08 VITALS
BODY MASS INDEX: 22.28 KG/M2 | WEIGHT: 173.6 LBS | HEIGHT: 74 IN | SYSTOLIC BLOOD PRESSURE: 146 MMHG | DIASTOLIC BLOOD PRESSURE: 80 MMHG | HEART RATE: 52 BPM | TEMPERATURE: 97.1 F

## 2023-05-08 DIAGNOSIS — R29.898 WEAKNESS OF BOTH LOWER EXTREMITIES: ICD-10-CM

## 2023-05-08 DIAGNOSIS — I10 BENIGN ESSENTIAL HYPERTENSION: ICD-10-CM

## 2023-05-08 DIAGNOSIS — M48.061 LUMBAR FORAMINAL STENOSIS: ICD-10-CM

## 2023-05-08 DIAGNOSIS — R04.2 HEMOPTYSIS: Primary | ICD-10-CM

## 2023-05-08 PROCEDURE — 1160F RVW MEDS BY RX/DR IN RCRD: CPT | Performed by: INTERNAL MEDICINE

## 2023-05-08 PROCEDURE — 1157F ADVNC CARE PLAN IN RCRD: CPT | Performed by: INTERNAL MEDICINE

## 2023-05-08 PROCEDURE — 3077F SYST BP >= 140 MM HG: CPT | Performed by: INTERNAL MEDICINE

## 2023-05-08 PROCEDURE — 3079F DIAST BP 80-89 MM HG: CPT | Performed by: INTERNAL MEDICINE

## 2023-05-08 PROCEDURE — 1036F TOBACCO NON-USER: CPT | Performed by: INTERNAL MEDICINE

## 2023-05-08 PROCEDURE — 99213 OFFICE O/P EST LOW 20 MIN: CPT | Performed by: INTERNAL MEDICINE

## 2023-05-08 PROCEDURE — 1159F MED LIST DOCD IN RCRD: CPT | Performed by: INTERNAL MEDICINE

## 2023-05-08 RX ORDER — LOSARTAN POTASSIUM 25 MG/1
25 TABLET ORAL DAILY
Qty: 90 TABLET | Refills: 3 | Status: SHIPPED | OUTPATIENT
Start: 2023-05-08 | End: 2023-07-17 | Stop reason: SDUPTHER

## 2023-05-08 ASSESSMENT — ENCOUNTER SYMPTOMS
CARDIOVASCULAR NEGATIVE: 1
EYES NEGATIVE: 1
GASTROINTESTINAL NEGATIVE: 1
NUMBNESS: 1
ABDOMINAL PAIN: 0
EXTREMITY WEAKNESS: 1
PARESTHESIAS: 1
FATIGUE: 1
WEAKNESS: 1
COUGH: 1
BACK PAIN: 1

## 2023-05-08 NOTE — PROGRESS NOTES
"Subjective   Patient ID: Brian Lennon Jr. is a 88 y.o. male who presents for Back Pain (Coughing up blood in the morning. Not able to stand for long periods of time.).    Back Pain  This is a chronic problem. The current episode started more than 1 month ago. The problem occurs constantly. The problem is unchanged. The pain is present in the lumbar spine. Associated symptoms include numbness, paresthesias and weakness. Pertinent negatives include no abdominal pain, bladder incontinence, chest pain or pelvic pain. The treatment provided no relief.   Extremity Weakness  This is a recurrent problem. The current episode started more than 1 month ago. Associated symptoms include coughing, fatigue, numbness and weakness. Pertinent negatives include no abdominal pain or chest pain. The treatment provided no relief.        Review of Systems   Constitutional:  Positive for fatigue.   HENT: Negative.     Eyes: Negative.    Respiratory:  Positive for cough.         Hemoptysis   Cardiovascular: Negative.  Negative for chest pain.   Gastrointestinal: Negative.  Negative for abdominal pain.   Genitourinary:  Negative for bladder incontinence and pelvic pain.   Musculoskeletal:  Positive for back pain and extremity weakness.   Skin: Negative.    Neurological:  Positive for weakness, numbness and paresthesias.       Objective   /80 (BP Location: Left arm, Patient Position: Sitting, BP Cuff Size: Adult)   Pulse 52   Temp 36.2 °C (97.1 °F) (Temporal)   Ht 1.88 m (6' 2\")   Wt 78.7 kg (173 lb 9.6 oz)   BMI 22.29 kg/m²     Physical Exam  Vitals reviewed.   Constitutional:       Appearance: Normal appearance. He is normal weight.   HENT:      Head: Normocephalic and atraumatic.   Eyes:      Conjunctiva/sclera: Conjunctivae normal.   Cardiovascular:      Rate and Rhythm: Normal rate and regular rhythm.      Pulses: Normal pulses.   Pulmonary:      Effort: Pulmonary effort is normal.      Breath sounds: Normal breath sounds. "   Abdominal:      Palpations: Abdomen is soft.   Musculoskeletal:      Cervical back: Normal range of motion.      Comments: Stooped forward, tenderness at L spine in midline, slightly reduced power left lower limb   Skin:     General: Skin is warm and dry.   Neurological:      General: No focal deficit present.   Assessment/Plan   Problem List Items Addressed This Visit          Nervous    Lumbar foraminal stenosis    Weakness of both lower extremities       Respiratory    Hemoptysis - Primary   He called today he says his legs are weak, he says that he is not able to sustain mobility, I saw him for the same problem about 8 days ago and at that time I have ordered MRI and CT of chest, the reason for CT of chest is because he complained of hemoptysis, he says that in the morning he has been having blood in the sputum so we checked that his testing has been coming up this Friday, I told him that without having this the investigations done we cannot figure out what to do, his problems could reflect lumbar stenosis, he could walk, he brought a walker, he did not bring any sputum sample, we just have to wait for this reports and then he will be guided further I told him, they have come to the point that they will need some help at home, both of them  and wife has been having some issues, perhaps both of them have a cognitive impairment issues, I told them that some of the family member has to come and help them and perhaps needs to take in charge of both of them, wife was present during encounter, told them that testing will be done and they will be recommended appropriately.

## 2023-05-12 DIAGNOSIS — R29.898 WEAKNESS OF BOTH LOWER EXTREMITIES: Primary | ICD-10-CM

## 2023-05-12 DIAGNOSIS — M48.061 LUMBAR FORAMINAL STENOSIS: ICD-10-CM

## 2023-05-30 LAB — PROSTATE SPECIFIC AG (NG/ML) IN SER/PLAS: 1.35 NG/ML (ref 0–4)

## 2023-06-13 DIAGNOSIS — I50.21 ACUTE SYSTOLIC CONGESTIVE HEART FAILURE (MULTI): ICD-10-CM

## 2023-06-13 RX ORDER — FUROSEMIDE 40 MG/1
40 TABLET ORAL DAILY
Qty: 30 TABLET | Refills: 3 | Status: SHIPPED | OUTPATIENT
Start: 2023-06-13 | End: 2023-09-10

## 2023-06-26 ENCOUNTER — APPOINTMENT (OUTPATIENT)
Dept: PRIMARY CARE | Facility: CLINIC | Age: 88
End: 2023-06-26
Payer: MEDICARE

## 2023-07-17 ENCOUNTER — OFFICE VISIT (OUTPATIENT)
Dept: PRIMARY CARE | Facility: CLINIC | Age: 88
End: 2023-07-17
Payer: MEDICARE

## 2023-07-17 VITALS
BODY MASS INDEX: 25.7 KG/M2 | TEMPERATURE: 98 F | SYSTOLIC BLOOD PRESSURE: 139 MMHG | WEIGHT: 174 LBS | DIASTOLIC BLOOD PRESSURE: 69 MMHG | HEART RATE: 56 BPM

## 2023-07-17 DIAGNOSIS — I10 BENIGN ESSENTIAL HYPERTENSION: ICD-10-CM

## 2023-07-17 DIAGNOSIS — I25.10 ARTERIOSCLEROTIC CARDIOVASCULAR DISEASE: Primary | ICD-10-CM

## 2023-07-17 DIAGNOSIS — Z95.1 S/P CABG X 3: ICD-10-CM

## 2023-07-17 DIAGNOSIS — N18.31 STAGE 3A CHRONIC KIDNEY DISEASE (MULTI): ICD-10-CM

## 2023-07-17 PROCEDURE — 1157F ADVNC CARE PLAN IN RCRD: CPT | Performed by: INTERNAL MEDICINE

## 2023-07-17 PROCEDURE — 3078F DIAST BP <80 MM HG: CPT | Performed by: INTERNAL MEDICINE

## 2023-07-17 PROCEDURE — 1160F RVW MEDS BY RX/DR IN RCRD: CPT | Performed by: INTERNAL MEDICINE

## 2023-07-17 PROCEDURE — 1159F MED LIST DOCD IN RCRD: CPT | Performed by: INTERNAL MEDICINE

## 2023-07-17 PROCEDURE — 99213 OFFICE O/P EST LOW 20 MIN: CPT | Performed by: INTERNAL MEDICINE

## 2023-07-17 PROCEDURE — 3075F SYST BP GE 130 - 139MM HG: CPT | Performed by: INTERNAL MEDICINE

## 2023-07-17 PROCEDURE — 1036F TOBACCO NON-USER: CPT | Performed by: INTERNAL MEDICINE

## 2023-07-17 RX ORDER — CARVEDILOL 12.5 MG/1
12.5 TABLET ORAL 2 TIMES DAILY
Qty: 90 TABLET | Refills: 3 | Status: SHIPPED | OUTPATIENT
Start: 2023-07-17 | End: 2023-08-26

## 2023-07-17 RX ORDER — LOSARTAN POTASSIUM 25 MG/1
25 TABLET ORAL DAILY
Qty: 90 TABLET | Refills: 3 | Status: SHIPPED | OUTPATIENT
Start: 2023-07-17 | End: 2024-05-06 | Stop reason: SDUPTHER

## 2023-07-17 ASSESSMENT — ENCOUNTER SYMPTOMS
NEUROLOGICAL NEGATIVE: 1
CARDIOVASCULAR NEGATIVE: 1
HYPERTENSION: 1
ANOREXIA: 0
CONSTITUTIONAL NEGATIVE: 1
ARTHRALGIAS: 1
HEADACHES: 0
GASTROINTESTINAL NEGATIVE: 1
ABDOMINAL PAIN: 0
EYES NEGATIVE: 1
RESPIRATORY NEGATIVE: 1

## 2023-07-17 NOTE — PROGRESS NOTES
Subjective   Patient ID: Brian Lennon Jr. is a 88 y.o. male who presents for Follow-up (ROUTINE FU AND MED REFILL).    Hypertension  This is a chronic problem. The current episode started more than 1 year ago. The problem has been resolved since onset. The problem is controlled. Pertinent negatives include no anxiety or headaches. Past treatments include beta blockers. The current treatment provides significant improvement. Hypertensive end-organ damage includes kidney disease. Identifiable causes of hypertension include chronic renal disease.   Heart Problem  This is a chronic problem. The current episode started more than 1 year ago. The problem occurs rarely. The problem has been resolved. Associated symptoms include arthralgias. Pertinent negatives include no abdominal pain, anorexia, congestion or headaches. The treatment provided significant relief.        Review of Systems   Constitutional: Negative.    HENT: Negative.  Negative for congestion.    Eyes: Negative.    Respiratory: Negative.     Cardiovascular: Negative.    Gastrointestinal: Negative.  Negative for abdominal pain and anorexia.   Genitourinary:         SAYS THAT THERE IS STOOL LIKE STUFF IN URINE?   Musculoskeletal:  Positive for arthralgias.   Neurological: Negative.  Negative for headaches.       Objective   /69 (BP Location: Left arm, Patient Position: Sitting, BP Cuff Size: Adult)   Pulse 56   Temp 36.7 °C (98 °F) (Temporal)   Wt 78.9 kg (174 lb)   BMI 25.70 kg/m²     Physical Exam  Vitals reviewed.   Constitutional:       Appearance: Normal appearance. He is normal weight.   HENT:      Head: Normocephalic and atraumatic.   Eyes:      Conjunctiva/sclera: Conjunctivae normal.   Cardiovascular:      Rate and Rhythm: Normal rate and regular rhythm.      Pulses: Normal pulses.   Pulmonary:      Effort: Pulmonary effort is normal.      Breath sounds: Normal breath sounds.   Abdominal:      Palpations: Abdomen is soft.    Musculoskeletal:      Cervical back: Normal range of motion.      Comments: Stooped forward, tenderness at L spine , no weakness felt  Skin:     General: Skin is warm and dry.   Neurological:      General: No focal deficit present.   Assessment/Plan   Problem List Items Addressed This Visit       Arteriosclerotic cardiovascular disease - Primary    Benign essential hypertension    S/P CABG x 3    Stage 3a chronic kidney disease   I surprised patient ended up seeing couple of other doctors, no one has told me or inform me but he never complained to me that he has some fecal feces like material when he has a micturition, so they are suspecting rectal vesicle fistula, I told patient that go back to a primary urologist and explained what exactly is going on because if that is the situation patient requires a cystogram, I see that MRI of pelvis has been ordered.  As far as this back pain and weakness of the legs were concerned he was not a surgical candidate, his BP readings are stable, he is also slowing down cognitively, he is able to ambulate with the help of walker, spine surgery evaluation was reviewed, MRI was reviewed, it is not very much clear whether feels cystlike material is coming out with micturition, MRI of pelvis will be followed, no chest pains, no angina, chronic kidney disease persist, statin therapy to be continued, BP readings are stable, follow-up in 3 months.

## 2023-08-07 ENCOUNTER — APPOINTMENT (OUTPATIENT)
Dept: PRIMARY CARE | Facility: CLINIC | Age: 88
End: 2023-08-07
Payer: MEDICARE

## 2023-08-26 DIAGNOSIS — I10 BENIGN ESSENTIAL HYPERTENSION: ICD-10-CM

## 2023-08-26 RX ORDER — CARVEDILOL 12.5 MG/1
12.5 TABLET ORAL 2 TIMES DAILY
Qty: 90 TABLET | Refills: 3 | Status: SHIPPED | OUTPATIENT
Start: 2023-08-26 | End: 2024-02-23

## 2023-09-09 DIAGNOSIS — I50.21 ACUTE SYSTOLIC CONGESTIVE HEART FAILURE (MULTI): ICD-10-CM

## 2023-09-10 RX ORDER — FUROSEMIDE 40 MG/1
40 TABLET ORAL DAILY
Qty: 90 TABLET | Refills: 1 | Status: SHIPPED | OUTPATIENT
Start: 2023-09-10 | End: 2024-03-05

## 2023-10-17 ENCOUNTER — OFFICE VISIT (OUTPATIENT)
Dept: PRIMARY CARE | Facility: CLINIC | Age: 88
End: 2023-10-17
Payer: MEDICARE

## 2023-10-17 VITALS
WEIGHT: 171 LBS | DIASTOLIC BLOOD PRESSURE: 78 MMHG | HEART RATE: 67 BPM | HEIGHT: 66 IN | SYSTOLIC BLOOD PRESSURE: 124 MMHG | TEMPERATURE: 96.8 F | BODY MASS INDEX: 27.48 KG/M2

## 2023-10-17 DIAGNOSIS — I10 BENIGN ESSENTIAL HYPERTENSION: ICD-10-CM

## 2023-10-17 DIAGNOSIS — Z23 NEED FOR INFLUENZA VACCINATION: ICD-10-CM

## 2023-10-17 DIAGNOSIS — G47.33 OBSTRUCTIVE SLEEP APNEA SYNDROME: ICD-10-CM

## 2023-10-17 DIAGNOSIS — N18.31 STAGE 3A CHRONIC KIDNEY DISEASE (MULTI): ICD-10-CM

## 2023-10-17 DIAGNOSIS — I25.10 ARTERIOSCLEROTIC CARDIOVASCULAR DISEASE: Primary | ICD-10-CM

## 2023-10-17 PROCEDURE — 1160F RVW MEDS BY RX/DR IN RCRD: CPT | Performed by: INTERNAL MEDICINE

## 2023-10-17 PROCEDURE — 3074F SYST BP LT 130 MM HG: CPT | Performed by: INTERNAL MEDICINE

## 2023-10-17 PROCEDURE — 1125F AMNT PAIN NOTED PAIN PRSNT: CPT | Performed by: INTERNAL MEDICINE

## 2023-10-17 PROCEDURE — 99213 OFFICE O/P EST LOW 20 MIN: CPT | Performed by: INTERNAL MEDICINE

## 2023-10-17 PROCEDURE — 3078F DIAST BP <80 MM HG: CPT | Performed by: INTERNAL MEDICINE

## 2023-10-17 PROCEDURE — 90662 IIV NO PRSV INCREASED AG IM: CPT | Performed by: INTERNAL MEDICINE

## 2023-10-17 PROCEDURE — G0008 ADMIN INFLUENZA VIRUS VAC: HCPCS | Performed by: INTERNAL MEDICINE

## 2023-10-17 PROCEDURE — 1159F MED LIST DOCD IN RCRD: CPT | Performed by: INTERNAL MEDICINE

## 2023-10-17 PROCEDURE — 1036F TOBACCO NON-USER: CPT | Performed by: INTERNAL MEDICINE

## 2023-10-17 ASSESSMENT — ENCOUNTER SYMPTOMS
CONSTITUTIONAL NEGATIVE: 1
BRUISES/BLEEDS EASILY: 0
ARTHRALGIAS: 1
FATIGUE: 0
NEUROLOGICAL NEGATIVE: 1
RESPIRATORY NEGATIVE: 1
CARDIOVASCULAR NEGATIVE: 1
GASTROINTESTINAL NEGATIVE: 1
BACK PAIN: 1

## 2023-10-17 NOTE — PROGRESS NOTES
"Subjective   Patient ID: Brian Lennon is a 88 y.o. male who presents for Follow-up.    HPI   Hypertension  This is a chronic problem. The current episode started more than 1 year ago. The problem has been resolved since onset. The problem is controlled. Pertinent negatives include no anxiety or headaches. Past treatments include beta blockers. The current treatment provides significant improvement. Hypertensive end-organ damage includes kidney disease. Identifiable causes of hypertension include chronic renal disease.   Heart Problem  This is a chronic problem. The current episode started more than 1 year ago. The problem occurs rarely. The problem has been resolved. Associated symptoms include arthralgias. Pertinent negatives include no abdominal pain, anorexia, congestion or headaches. The treatment provided significant relief.   Review of Systems   Constitutional: Negative.  Negative for fatigue.   HENT: Negative.     Eyes:  Negative for visual disturbance.   Respiratory: Negative.     Cardiovascular: Negative.    Gastrointestinal: Negative.    Musculoskeletal:  Positive for arthralgias and back pain.   Skin: Negative.    Neurological: Negative.    Hematological:  Does not bruise/bleed easily.       Objective   /78 (BP Location: Right arm, Patient Position: Sitting, BP Cuff Size: Adult)   Pulse 67   Temp 36 °C (96.8 °F) (Temporal)   Ht 1.664 m (5' 5.5\")   Wt 77.6 kg (171 lb)   BMI 28.02 kg/m²     Physical Exam  Vitals reviewed.   Constitutional:       Appearance: Normal appearance. He is overweight.   HENT:      Head: Normocephalic and atraumatic.   Eyes:      Conjunctiva/sclera: Conjunctivae normal.   Cardiovascular:      Rate and Rhythm: Normal rate and regular rhythm.      Pulses: Normal pulses.   Pulmonary:      Effort: Pulmonary effort is normal.      Breath sounds: Normal breath sounds.   Abdominal:      Palpations: Abdomen is soft.   Musculoskeletal:         General: Normal range of motion.    "   Cervical back: Normal range of motion.   Skin:     General: Skin is warm and dry.   Neurological:      General: No focal deficit present.   Psychiatric:         Mood and Affect: Mood normal.         Assessment/Plan   Problem List Items Addressed This Visit             ICD-10-CM    Arteriosclerotic cardiovascular disease - Primary I25.10    Benign essential hypertension I10    Obstructive sleep apnea syndrome G47.33    Stage 3a chronic kidney disease (CMS/HCC) N18.31   Patient remains compliant with CPAP mask, patient's back and the lower extremities are not getting progressively weaker, he does not need a spinal surgery and he also is not keen to do so.  His creatinine was 1.8 reflecting stage IIIa-IIIb CKD unchanged.  Skeletal survey was done there is no lytic lesion seen.  Thyroid ultrasound was done for some reason and there was a nodule which I do not think we should biopsy patient could not understand that he is not aware of it.  He has a history of CAD CABG he was having impaired systolic functions but pleural effusions has been subsided, he remains on 40 mg of Lasix.  He remains on beta-blockers.  At the age of 88 he is slowing down but much better than what happened to him in the beginning of this year.  There is no B-cell lymphoma obviously noted on the conjunctival fold, it is still not very much clear whether he has MGUS or not, he has asbestosis, it is a MALT or mucosa-associated lymphoid tissue on the conjunctiva.  Medications are reviewed, flu vaccine was suggested and given, rest of the fall vaccines were suggested, there is no impairment of cognitive functions, recent laboratories were reviewed, MRI of pelvis was done, there is no fistula, CT abdomen pelvis was reviewed, there was some sludge and debris in the bladder, was seen by urology, urine culture was negative, PSA was reviewed, follow-up in 3 months.

## 2023-10-19 ENCOUNTER — LAB (OUTPATIENT)
Dept: LAB | Facility: LAB | Age: 88
End: 2023-10-19
Payer: MEDICARE

## 2023-10-19 DIAGNOSIS — C61 MALIGNANT NEOPLASM OF PROSTATE (MULTI): ICD-10-CM

## 2023-10-19 DIAGNOSIS — C61 MALIGNANT NEOPLASM OF PROSTATE (MULTI): Primary | ICD-10-CM

## 2023-10-19 LAB — PSA SERPL-MCNC: 1.24 NG/ML

## 2023-10-19 PROCEDURE — 84153 ASSAY OF PSA TOTAL: CPT

## 2023-10-19 PROCEDURE — 36415 COLL VENOUS BLD VENIPUNCTURE: CPT

## 2024-01-16 ENCOUNTER — LAB (OUTPATIENT)
Dept: LAB | Facility: LAB | Age: 89
End: 2024-01-16
Payer: MEDICARE

## 2024-01-16 ENCOUNTER — OFFICE VISIT (OUTPATIENT)
Dept: PRIMARY CARE | Facility: CLINIC | Age: 89
End: 2024-01-16
Payer: MEDICARE

## 2024-01-16 VITALS
BODY MASS INDEX: 29.32 KG/M2 | WEIGHT: 176 LBS | HEART RATE: 65 BPM | DIASTOLIC BLOOD PRESSURE: 78 MMHG | SYSTOLIC BLOOD PRESSURE: 140 MMHG | TEMPERATURE: 96 F | HEIGHT: 65 IN

## 2024-01-16 DIAGNOSIS — I50.21 ACUTE SYSTOLIC CONGESTIVE HEART FAILURE (MULTI): ICD-10-CM

## 2024-01-16 DIAGNOSIS — Z00.00 ROUTINE GENERAL MEDICAL EXAMINATION AT HEALTH CARE FACILITY: Primary | ICD-10-CM

## 2024-01-16 DIAGNOSIS — J61 ASBESTOSIS (MULTI): ICD-10-CM

## 2024-01-16 DIAGNOSIS — E78.2 MIXED HYPERLIPIDEMIA: ICD-10-CM

## 2024-01-16 LAB — BNP SERPL-MCNC: 319 PG/ML (ref 0–99)

## 2024-01-16 PROCEDURE — 1125F AMNT PAIN NOTED PAIN PRSNT: CPT | Performed by: INTERNAL MEDICINE

## 2024-01-16 PROCEDURE — 3078F DIAST BP <80 MM HG: CPT | Performed by: INTERNAL MEDICINE

## 2024-01-16 PROCEDURE — 36415 COLL VENOUS BLD VENIPUNCTURE: CPT

## 2024-01-16 PROCEDURE — G0439 PPPS, SUBSEQ VISIT: HCPCS | Performed by: INTERNAL MEDICINE

## 2024-01-16 PROCEDURE — 1170F FXNL STATUS ASSESSED: CPT | Performed by: INTERNAL MEDICINE

## 2024-01-16 PROCEDURE — 3077F SYST BP >= 140 MM HG: CPT | Performed by: INTERNAL MEDICINE

## 2024-01-16 PROCEDURE — 1036F TOBACCO NON-USER: CPT | Performed by: INTERNAL MEDICINE

## 2024-01-16 PROCEDURE — 83880 ASSAY OF NATRIURETIC PEPTIDE: CPT

## 2024-01-16 PROCEDURE — 99397 PER PM REEVAL EST PAT 65+ YR: CPT | Performed by: INTERNAL MEDICINE

## 2024-01-16 PROCEDURE — 1159F MED LIST DOCD IN RCRD: CPT | Performed by: INTERNAL MEDICINE

## 2024-01-16 PROCEDURE — 1160F RVW MEDS BY RX/DR IN RCRD: CPT | Performed by: INTERNAL MEDICINE

## 2024-01-16 RX ORDER — ATORVASTATIN CALCIUM 40 MG/1
40 TABLET, FILM COATED ORAL NIGHTLY
Qty: 90 TABLET | Refills: 3 | Status: SHIPPED | OUTPATIENT
Start: 2024-01-16

## 2024-01-16 ASSESSMENT — ENCOUNTER SYMPTOMS
CHOKING: 0
COUGH: 0
OCCASIONAL FEELINGS OF UNSTEADINESS: 0
DIARRHEA: 0
PSYCHIATRIC NEGATIVE: 1
ABDOMINAL DISTENTION: 0
DIZZINESS: 0
BACK PAIN: 1
BRUISES/BLEEDS EASILY: 0
WEAKNESS: 1
CARDIOVASCULAR NEGATIVE: 1
WOUND: 0
LOSS OF SENSATION IN FEET: 0
DEPRESSION: 0
FATIGUE: 1
SHORTNESS OF BREATH: 1
NAUSEA: 0
ARTHRALGIAS: 1

## 2024-01-16 ASSESSMENT — ACTIVITIES OF DAILY LIVING (ADL)
EATING: INDEPENDENT
ADEQUATE_TO_COMPLETE_ADL: YES
BATHING: INDEPENDENT
ADEQUATE_TO_COMPLETE_ADL: YES
FEEDING: INDEPENDENT
DRESSING YOURSELF: INDEPENDENT
PREPARING MEALS: NEEDS ASSISTANCE
HEARING - LEFT EAR: DIFFICULTY WITH NOISE
WALKS IN HOME: INDEPENDENT
BATHING: INDEPENDENT
DOING_HOUSEWORK: NEEDS ASSISTANCE
GROCERY SHOPPING: NEEDS ASSISTANCE
USING TELEPHONE: INDEPENDENT
MANAGING FINANCES: INDEPENDENT
MANAGING_FINANCES: INDEPENDENT
USING TRANSPORTATION: INDEPENDENT
DRESSING: INDEPENDENT
GROOMING: INDEPENDENT
JUDGMENT_ADEQUATE_SAFELY_COMPLETE_DAILY_ACTIVITIES: YES
NEEDS ASSISTANCE WITH FOOD: INDEPENDENT
TAKING MEDICATION: INDEPENDENT
DRESSING: INDEPENDENT
FEEDING YOURSELF: INDEPENDENT
JUDGMENT_ADEQUATE_SAFELY_COMPLETE_DAILY_ACTIVITIES: YES
GROCERY_SHOPPING: NEEDS ASSISTANCE
TAKING_MEDICATION: INDEPENDENT
BATHING: INDEPENDENT
TOILETING: INDEPENDENT
STIL DRIVING: YES
PATIENT'S MEMORY ADEQUATE TO SAFELY COMPLETE DAILY ACTIVITIES?: YES
HEARING - RIGHT EAR: DIFFICULTY WITH NOISE
TOILETING: INDEPENDENT

## 2024-01-16 ASSESSMENT — PATIENT HEALTH QUESTIONNAIRE - PHQ9
SUM OF ALL RESPONSES TO PHQ9 QUESTIONS 1 AND 2: 0
SUM OF ALL RESPONSES TO PHQ9 QUESTIONS 1 AND 2: 0
2. FEELING DOWN, DEPRESSED OR HOPELESS: NOT AT ALL
1. LITTLE INTEREST OR PLEASURE IN DOING THINGS: NOT AT ALL
1. LITTLE INTEREST OR PLEASURE IN DOING THINGS: NOT AT ALL
2. FEELING DOWN, DEPRESSED OR HOPELESS: NOT AT ALL

## 2024-01-16 ASSESSMENT — COLUMBIA-SUICIDE SEVERITY RATING SCALE - C-SSRS
1. IN THE PAST MONTH, HAVE YOU WISHED YOU WERE DEAD OR WISHED YOU COULD GO TO SLEEP AND NOT WAKE UP?: NO
2. HAVE YOU ACTUALLY HAD ANY THOUGHTS OF KILLING YOURSELF?: NO

## 2024-01-16 NOTE — PROGRESS NOTES
Subjective   Reason for Visit: Brian Lennon is an 88 y.o. male here for a Medicare Wellness visit.     Past Medical, Surgical, and Family History reviewed and updated in chart.    Reviewed all medications by prescribing practitioner or clinical pharmacist (such as prescriptions, OTCs, herbal therapies and supplements) and documented in the medical record.    88-year-old male patient was seen for preventive care wellness exam.  He was seen by hematology today at [facility], he has MGUS like situation, one time they said that she has a MALToma, it is detected in the conjunctival folds, patient has a skeletal survey, patient has a PET scan, there is no evidence of any systemic lytic lesion or multiple myeloma.  Patient's creatinine has been fluctuating, he was having ejection fraction of 25 to 30% about a year ago, he stays with his spouse, he did not have any increasing fluid overload, there was a CT scan done in month of October and they are talking about some bladder diverticula which was seen on previous ultrasound and also they are talking about thickening of the bladder could be related to chronic cystitis, patient was advised to have a follow-up with the urology and he is a survivor of prostate cancer also.  He did not fall, he did not have any GI bleeding, he did not have any increasing shortness of breath.        Patient Care Team:  Jason Rodriguez MD as PCP - General  Jason Rodriguez MD as PCP - [employer] PCP     Review of Systems   Constitutional:  Positive for fatigue.   HENT:  Negative for congestion.    Eyes:  Negative for visual disturbance.   Respiratory:  Positive for shortness of breath. Negative for cough and choking.    Cardiovascular: Negative.    Gastrointestinal:  Negative for abdominal distention, diarrhea and nausea.   Musculoskeletal:  Positive for arthralgias, back pain and gait problem.   Skin:  Negative for wound.   Neurological:  Positive for weakness. Negative for dizziness.  "  Hematological:  Does not bruise/bleed easily.   Psychiatric/Behavioral: Negative.         Objective   Vitals:  /78 (BP Location: Left arm, Patient Position: Sitting, BP Cuff Size: Adult)   Pulse 65   Temp 35.6 °C (96 °F) (Temporal)   Ht 1.657 m (5' 5.25\")   Wt 79.8 kg (176 lb)   BMI 29.06 kg/m²       Physical Exam  Constitutional:       Appearance: Normal appearance. He is overweight.   HENT:      Head: Normocephalic.      Nose: Nose normal.   Eyes:      Conjunctiva/sclera: Conjunctivae normal.      Comments: Prominent fold of conjunctivae   Cardiovascular:      Rate and Rhythm: Normal rate and regular rhythm.      Heart sounds: Normal heart sounds. No murmur heard.     No gallop.   Pulmonary:      Effort: Pulmonary effort is normal.      Breath sounds: Rales present.   Abdominal:      Palpations: Abdomen is soft.   Musculoskeletal:         General: Tenderness present. No swelling or deformity.      Cervical back: Neck supple.   Skin:     General: Skin is warm and dry.   Neurological:      General: No focal deficit present.      Mental Status: He is oriented to person, place, and time. Mental status is at baseline.   Psychiatric:         Mood and Affect: Mood normal.         Judgment: Judgment normal.       Assessment/Plan   Problem List Items Addressed This Visit       Asbestosis (CMS/HCC)    Mixed hyperlipidemia    Relevant Medications    atorvastatin (Lipitor) 40 mg tablet    Acute systolic congestive heart failure (CMS/HCC)    Relevant Orders    Transthoracic Echo (TTE) Complete    Referral to Cardiology    B-type natriuretic peptide     Other Visit Diagnoses       Routine general medical examination at health care facility    -  Primary        Wellness exam was completed, he has history of asbestosis not acute problem, he has a compensated chronic systolic CHF echo is required, cardiology evaluation is required, he is not showing any signs of increasing shortness of breath.  He stays on 40 mg of " furosemide and statin and beta-blocker and ARB's.  Suggested him to follow-up with the urology about this bladder wall thickening, oncology evaluation was reviewed, chronic kidney disease persist, chronic anemia persist, not in the situation that patient is having systemic plasmacytosis or multiple myeloma or any systemic therapy required.  He is slow and steady, he is using support device for mobility and ambulation.  He has a living will, he has a lot of nephews and nieces around him, their safety at home is assessed by nephews and nieces, they are safe and comfortable at current living situation.  We will check BNP and echo, wellness exam was completed, physical checkup was done, follow-up in 3 months.

## 2024-01-23 ENCOUNTER — HOSPITAL ENCOUNTER (OUTPATIENT)
Dept: CARDIOLOGY | Facility: HOSPITAL | Age: 89
Discharge: HOME | End: 2024-01-23
Payer: MEDICARE

## 2024-01-23 DIAGNOSIS — I51.3 INTRACARDIAC THROMBOSIS, NOT ELSEWHERE CLASSIFIED: ICD-10-CM

## 2024-01-23 DIAGNOSIS — R09.89 NONSPECIFIC ABNORMAL FINDING ON CARDIAC EVALUATION: ICD-10-CM

## 2024-01-23 DIAGNOSIS — I50.21 ACUTE SYSTOLIC CONGESTIVE HEART FAILURE (MULTI): ICD-10-CM

## 2024-01-23 DIAGNOSIS — R93.1 ABNORMAL FINDINGS ON DIAGNOSTIC IMAGING OF HEART AND CORONARY CIRCULATION: ICD-10-CM

## 2024-01-23 DIAGNOSIS — I05.9 MITRAL VALVE DISORDER: Primary | ICD-10-CM

## 2024-01-23 LAB
AORTIC VALVE MEAN GRADIENT: 4 MMHG
AORTIC VALVE PEAK VELOCITY: 1.33 M/S
AV PEAK GRADIENT: 7.1 MMHG
AVA (PEAK VEL): 2.22 CM2
AVA (VTI): 2.14 CM2
EJECTION FRACTION APICAL 4 CHAMBER: 35.1
LEFT ATRIUM VOLUME AREA LENGTH INDEX BSA: 31.3 ML/M2
LEFT VENTRICLE INTERNAL DIMENSION DIASTOLE: 5.25 CM (ref 3.5–6)
LEFT VENTRICULAR OUTFLOW TRACT DIAMETER: 2 CM
MITRAL VALVE E/A RATIO: 0.74
RIGHT VENTRICLE FREE WALL PEAK S': 10.6 CM/S
RIGHT VENTRICLE PEAK SYSTOLIC PRESSURE: 51.4 MMHG
TRICUSPID ANNULAR PLANE SYSTOLIC EXCURSION: 1.7 CM

## 2024-01-23 PROCEDURE — 93306 TTE W/DOPPLER COMPLETE: CPT

## 2024-01-23 PROCEDURE — 93306 TTE W/DOPPLER COMPLETE: CPT | Performed by: INTERNAL MEDICINE

## 2024-02-09 ENCOUNTER — LAB (OUTPATIENT)
Dept: LAB | Facility: LAB | Age: 89
End: 2024-02-09
Payer: MEDICARE

## 2024-02-09 DIAGNOSIS — C61 MALIGNANT NEOPLASM OF PROSTATE (MULTI): ICD-10-CM

## 2024-02-09 DIAGNOSIS — E29.1 TESTICULAR HYPOFUNCTION: Primary | ICD-10-CM

## 2024-02-09 LAB
PSA SERPL-MCNC: 3.01 NG/ML
TESTOST SERPL-MCNC: 120 NG/DL (ref 240–1000)

## 2024-02-09 PROCEDURE — 36415 COLL VENOUS BLD VENIPUNCTURE: CPT

## 2024-02-09 PROCEDURE — 84153 ASSAY OF PSA TOTAL: CPT

## 2024-02-09 PROCEDURE — 84403 ASSAY OF TOTAL TESTOSTERONE: CPT

## 2024-02-23 DIAGNOSIS — I10 BENIGN ESSENTIAL HYPERTENSION: ICD-10-CM

## 2024-02-23 RX ORDER — CARVEDILOL 12.5 MG/1
12.5 TABLET ORAL 2 TIMES DAILY
Qty: 180 TABLET | Refills: 1 | Status: SHIPPED | OUTPATIENT
Start: 2024-02-23

## 2024-03-05 DIAGNOSIS — I50.21 ACUTE SYSTOLIC CONGESTIVE HEART FAILURE (MULTI): ICD-10-CM

## 2024-03-05 RX ORDER — FUROSEMIDE 40 MG/1
40 TABLET ORAL DAILY
Qty: 90 TABLET | Refills: 1 | Status: SHIPPED | OUTPATIENT
Start: 2024-03-05

## 2024-03-26 ENCOUNTER — LAB (OUTPATIENT)
Dept: LAB | Facility: LAB | Age: 89
End: 2024-03-26
Payer: MEDICARE

## 2024-03-26 ENCOUNTER — APPOINTMENT (OUTPATIENT)
Dept: CARDIOLOGY | Facility: CLINIC | Age: 89
End: 2024-03-26
Payer: MEDICARE

## 2024-03-26 DIAGNOSIS — C61 MALIGNANT NEOPLASM OF PROSTATE (MULTI): Primary | ICD-10-CM

## 2024-03-26 LAB
PSA SERPL-MCNC: 3.73 NG/ML
TESTOST SERPL-MCNC: 101 NG/DL (ref 240–1000)

## 2024-03-26 PROCEDURE — 36415 COLL VENOUS BLD VENIPUNCTURE: CPT

## 2024-03-26 PROCEDURE — 84403 ASSAY OF TOTAL TESTOSTERONE: CPT

## 2024-03-26 PROCEDURE — 84153 ASSAY OF PSA TOTAL: CPT

## 2024-04-18 ENCOUNTER — OFFICE VISIT (OUTPATIENT)
Dept: PRIMARY CARE | Facility: CLINIC | Age: 89
End: 2024-04-18
Payer: MEDICARE

## 2024-04-18 VITALS
HEIGHT: 65 IN | BODY MASS INDEX: 28.82 KG/M2 | HEART RATE: 54 BPM | WEIGHT: 173 LBS | DIASTOLIC BLOOD PRESSURE: 79 MMHG | SYSTOLIC BLOOD PRESSURE: 129 MMHG | TEMPERATURE: 96.9 F

## 2024-04-18 DIAGNOSIS — D47.2 MGUS (MONOCLONAL GAMMOPATHY OF UNKNOWN SIGNIFICANCE): ICD-10-CM

## 2024-04-18 DIAGNOSIS — I10 BENIGN ESSENTIAL HYPERTENSION: Primary | ICD-10-CM

## 2024-04-18 DIAGNOSIS — I50.42 CHRONIC COMBINED SYSTOLIC AND DIASTOLIC CONGESTIVE HEART FAILURE (MULTI): ICD-10-CM

## 2024-04-18 DIAGNOSIS — I25.10 ARTERIOSCLEROTIC CARDIOVASCULAR DISEASE: ICD-10-CM

## 2024-04-18 DIAGNOSIS — N18.31 STAGE 3A CHRONIC KIDNEY DISEASE (MULTI): ICD-10-CM

## 2024-04-18 DIAGNOSIS — G47.33 OBSTRUCTIVE SLEEP APNEA SYNDROME: ICD-10-CM

## 2024-04-18 PROBLEM — G82.20 PARAPARESIS (MULTI): Status: RESOLVED | Noted: 2024-04-18 | Resolved: 2024-04-18

## 2024-04-18 PROBLEM — G82.20 PARAPARESIS (MULTI): Status: ACTIVE | Noted: 2024-04-18

## 2024-04-18 PROCEDURE — 3074F SYST BP LT 130 MM HG: CPT | Performed by: INTERNAL MEDICINE

## 2024-04-18 PROCEDURE — 3078F DIAST BP <80 MM HG: CPT | Performed by: INTERNAL MEDICINE

## 2024-04-18 PROCEDURE — 1159F MED LIST DOCD IN RCRD: CPT | Performed by: INTERNAL MEDICINE

## 2024-04-18 PROCEDURE — 99213 OFFICE O/P EST LOW 20 MIN: CPT | Performed by: INTERNAL MEDICINE

## 2024-04-18 PROCEDURE — 1157F ADVNC CARE PLAN IN RCRD: CPT | Performed by: INTERNAL MEDICINE

## 2024-04-18 PROCEDURE — 1160F RVW MEDS BY RX/DR IN RCRD: CPT | Performed by: INTERNAL MEDICINE

## 2024-04-18 PROCEDURE — 1036F TOBACCO NON-USER: CPT | Performed by: INTERNAL MEDICINE

## 2024-04-18 ASSESSMENT — ENCOUNTER SYMPTOMS
DIZZINESS: 0
ARTHRALGIAS: 1
CONSTITUTIONAL NEGATIVE: 1
UNEXPECTED WEIGHT CHANGE: 0
GASTROINTESTINAL NEGATIVE: 1
RESPIRATORY NEGATIVE: 1
CARDIOVASCULAR NEGATIVE: 1

## 2024-04-18 NOTE — PROGRESS NOTES
"Subjective   Patient ID: Brian Lennon is a 89 y.o. male who presents for Follow-up (3 mo fu).    HPI   Hypertension  This is a chronic problem. The current episode started more than 1 year ago. The problem has been resolved since onset. The problem is controlled. Pertinent negatives include no anxiety or headaches. Past treatments include beta blockers. The current treatment provides significant improvement. Hypertensive end-organ damage includes kidney disease. Identifiable causes of hypertension include chronic renal disease.   Heart Problem  This is a chronic problem. The current episode started more than 1 year ago. The problem occurs rarely. The problem has been resolved. Associated symptoms include arthralgias. Pertinent negatives include no abdominal pain, anorexia, congestion or headaches. The treatment provided significant relief.  Review of Systems   Constitutional: Negative.  Negative for unexpected weight change.   HENT:  Negative for congestion.    Eyes:  Negative for visual disturbance.   Respiratory: Negative.     Cardiovascular: Negative.    Gastrointestinal: Negative.    Musculoskeletal:  Positive for arthralgias. Negative for gait problem.   Neurological:  Negative for dizziness.       Objective   /79 (BP Location: Right arm, Patient Position: Sitting, BP Cuff Size: Adult)   Pulse 54   Temp 36.1 °C (96.9 °F) (Temporal)   Ht 1.657 m (5' 5.25\")   Wt 78.5 kg (173 lb)   BMI 28.57 kg/m²     Physical Exam  Vitals reviewed.   Constitutional:       Appearance: Normal appearance. He is overweight.   HENT:      Head: Normocephalic and atraumatic.   Eyes:      Conjunctiva/sclera: Conjunctivae normal.   Cardiovascular:      Rate and Rhythm: Bradycardia present. Rhythm regularly irregular.      Pulses: Normal pulses.   Pulmonary:      Effort: Pulmonary effort is normal.      Breath sounds: Normal breath sounds.   Abdominal:      Palpations: Abdomen is soft.   Musculoskeletal:         General: " Normal range of motion.      Cervical back: Normal range of motion.   Skin:     General: Skin is warm and dry.   Neurological:      General: No focal deficit present.   Psychiatric:         Mood and Affect: Mood normal.         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  He has echo done ejection fraction was reported to be normal but they are reporting some lesion on the mitral leaflet and they say vegetation cannot be ruled out so we asked patient to do transesophageal echocardiography which was never done, then I told patient that he needs to be seen by cardiology because this echocardiographic finding is not making any sense there is no clinical evidence of endocarditis.  I will simply refer patient to the cardiology they can look into the echo someone is to look into this echo again.  He has a history of PCI no angina, he does not have any weakness of the legs, his systolic functions are improved, BP readings are stable, he is compliant with CPAP mask and regular uses of CPAP mask was reviewed from the data download.  All other reports and investigation from [facility] were reviewed, beta-blocker, losartan to be continued,'s testosterone levels were reviewed recently received injection for prostate cancer to urology.  Wife did not have any questions, follow-up in 3 months.

## 2024-05-06 ENCOUNTER — OFFICE VISIT (OUTPATIENT)
Dept: CARDIOLOGY | Facility: CLINIC | Age: 89
End: 2024-05-06
Payer: MEDICARE

## 2024-05-06 VITALS
WEIGHT: 175.3 LBS | HEIGHT: 69 IN | DIASTOLIC BLOOD PRESSURE: 70 MMHG | BODY MASS INDEX: 25.96 KG/M2 | HEART RATE: 56 BPM | SYSTOLIC BLOOD PRESSURE: 140 MMHG

## 2024-05-06 DIAGNOSIS — E78.2 MIXED HYPERLIPIDEMIA: ICD-10-CM

## 2024-05-06 DIAGNOSIS — I50.42 CHRONIC COMBINED SYSTOLIC AND DIASTOLIC CONGESTIVE HEART FAILURE (MULTI): ICD-10-CM

## 2024-05-06 DIAGNOSIS — Z87.891 FORMER SMOKER: ICD-10-CM

## 2024-05-06 DIAGNOSIS — I10 BENIGN ESSENTIAL HYPERTENSION: ICD-10-CM

## 2024-05-06 DIAGNOSIS — I49.3 PVC (PREMATURE VENTRICULAR CONTRACTION): ICD-10-CM

## 2024-05-06 DIAGNOSIS — I25.10 ARTERIOSCLEROTIC CARDIOVASCULAR DISEASE: ICD-10-CM

## 2024-05-06 PROCEDURE — 1157F ADVNC CARE PLAN IN RCRD: CPT | Performed by: INTERNAL MEDICINE

## 2024-05-06 PROCEDURE — 3078F DIAST BP <80 MM HG: CPT | Performed by: INTERNAL MEDICINE

## 2024-05-06 PROCEDURE — 1159F MED LIST DOCD IN RCRD: CPT | Performed by: INTERNAL MEDICINE

## 2024-05-06 PROCEDURE — 99214 OFFICE O/P EST MOD 30 MIN: CPT | Performed by: INTERNAL MEDICINE

## 2024-05-06 PROCEDURE — 1160F RVW MEDS BY RX/DR IN RCRD: CPT | Performed by: INTERNAL MEDICINE

## 2024-05-06 PROCEDURE — 3077F SYST BP >= 140 MM HG: CPT | Performed by: INTERNAL MEDICINE

## 2024-05-06 PROCEDURE — 1036F TOBACCO NON-USER: CPT | Performed by: INTERNAL MEDICINE

## 2024-05-06 RX ORDER — MIRABEGRON 25 MG/1
25 TABLET, FILM COATED, EXTENDED RELEASE ORAL DAILY
Qty: 90 TABLET | Refills: 1 | Status: SHIPPED | OUTPATIENT
Start: 2024-05-06 | End: 2024-05-08 | Stop reason: SDUPTHER

## 2024-05-06 RX ORDER — LOSARTAN POTASSIUM 50 MG/1
50 TABLET ORAL DAILY
Qty: 90 TABLET | Refills: 3 | Status: SHIPPED | OUTPATIENT
Start: 2024-05-06 | End: 2025-05-01

## 2024-05-06 NOTE — PROGRESS NOTES
CARDIOLOGY OFFICE VISIT      CHIEF COMPLAINT  No chief complaint on file.       HISTORY OF PRESENT ILLNESS  Brian Lennon is a 89 y.o. year old male patient with a history of CAD and remote CABG, hypertension and dyslipidemia and nonischemic cardiomyopathy with a previous EF of about 30% on prior echocardiogram.  He also has a history of frequent PVCs and complains of increased fatigue, but no chest pain.  He also denied orthopnea proximal nocturnal dyspnea.  Recent echocardiogram from January 2024 shows significantly improved LV function which is back to normal with grade 1 diastolic dysfunction, moderate right ventricular enlargement with a mildly reduced RV systolic function.  Labs show baseline creatinine of 2.1 to with a GFR of 30.  BN peptide is 319 which is down from 1005 in February 2023  Lipids from September 2022 shows cholesterol is 106, HDL is 46, LDL is 48 and triglyceride is 62    ASSESSMENT AND PLAN  1.  Nonischemic cardiomyopathy: With normalized LV function on repeat echocardiogram as noted above, and no recurrent chest pain.  Will continue current medications.  2.  CAD s/p CABG with no active chest pain.  3.  Excessive fatigue: May be multifactorial secondary to advanced age and multiple medications.  Will optimize blood pressure control.  4.  Hypertension: Blood pressure suboptimally controlled, will increase losartan to 50 mg daily  5.  History of frequent PVCs and nonsustained VT: Will get 48-hour Holter monitor for reevaluation and rule out any significant bradycardia arrhythmias.        Recent Cardiovascular Testing:    Echo-  Stress-  Cath-  Carotid Ultrasound-    Past Medical History  No past medical history on file.    Social History  Social History     Tobacco Use    Smoking status: Former     Types: Cigarettes    Smokeless tobacco: Never   Vaping Use    Vaping status: Never Used   Substance Use Topics    Alcohol use: Not Currently    Drug use: Never       Family History     Family  "History   Problem Relation Name Age of Onset    Diabetes Mother          Allergies:  No Known Allergies     Outpatient Medications:  Current Outpatient Medications   Medication Instructions    aspirin (ECOTRIN LOW STRENGTH) 81 mg, oral, Daily    atorvastatin (LIPITOR) 40 mg, oral, Nightly    carvedilol (COREG) 12.5 mg, oral, 2 times daily    furosemide (LASIX) 40 mg, oral, Daily    losartan (COZAAR) 25 mg, oral, Daily    meclizine (Antivert) 12.5 mg tablet 1 tablet, oral, 3 times daily PRN    mirabegron (MYRBETRIQ) 25 mg, oral, Daily    nitroglycerin (NITROSTAT) 0.4 mg, sublingual, Every 5 min PRN    propylene glycoL 0.6 % drops 2 drops, ophthalmic (eye), 2 times daily    vit A/vit C/vit E/zinc/copper (PRESERVISION AREDS ORAL) 1 tablet, oral, Every 12 hours    white petrolatum-mineral oiL 94-3 % ophthalmic ointment 1 Application, Both Eyes, Nightly        Recent Lab Results:    CBC:   Lab Results   Component Value Date    WBC 7.8 09/04/2023    RBC 3.64 (L) 09/04/2023    HGB 11.1 (L) 09/04/2023    HCT 33.6 (L) 09/04/2023     09/04/2023        CMP:    Lab Results   Component Value Date     (L) 09/04/2023    K 4.5 09/04/2023     09/04/2023    CO2 25 09/04/2023    BUN 44 (H) 09/04/2023    CREATININE 1.84 (H) 09/04/2023    GLUCOSE 99 09/04/2023    CALCIUM 8.9 09/04/2023       Magnesium:    Lab Results   Component Value Date    MG 1.59 (L) 02/28/2023       Lipid Profile:    Lab Results   Component Value Date    TRIG 62 09/13/2022    HDL 46.0 09/13/2022       TSH:    Lab Results   Component Value Date    TSH 1.75 10/21/2021       BNP:   Lab Results   Component Value Date     (H) 01/16/2024        PT/INR:    Lab Results   Component Value Date    PROTIME 11.6 09/04/2023    INR 1.0 09/04/2023       HgBA1c:    No results found for: \"HGBA1C\"    BMP:  Lab Results   Component Value Date     (L) 09/04/2023    NA CANCELED 03/02/2023     (L) 03/01/2023    K 4.5 09/04/2023    K CANCELED " 03/02/2023    K 3.9 03/01/2023     09/04/2023    CL CANCELED 03/02/2023     03/01/2023    CO2 25 09/04/2023    CO2 CANCELED 03/02/2023    CO2 27 03/01/2023    BUN 44 (H) 09/04/2023    BUN CANCELED 03/02/2023    BUN 34 (H) 03/01/2023    CREATININE 1.84 (H) 09/04/2023    CREATININE CANCELED 03/02/2023    CREATININE 1.76 (H) 03/01/2023       CBC:  Lab Results   Component Value Date    WBC 7.8 09/04/2023    WBC CANCELED 03/02/2023    WBC 6.6 03/01/2023    RBC 3.64 (L) 09/04/2023    RBC CANCELED 03/02/2023    RBC 3.90 (L) 03/01/2023    HGB 11.1 (L) 09/04/2023    HGB CANCELED 03/02/2023    HGB 11.3 (L) 03/01/2023    HCT 33.6 (L) 09/04/2023    HCT CANCELED 03/02/2023    HCT 34.3 (L) 03/01/2023    MCV 92 09/04/2023    MCV CANCELED 03/02/2023    MCV 88 03/01/2023    MCHC 33.0 09/04/2023    MCHC CANCELED 03/02/2023    MCHC 32.9 03/01/2023    RDW 13.1 09/04/2023    RDW CANCELED 03/02/2023    RDW 13.3 03/01/2023     09/04/2023    PLT CANCELED 03/02/2023     03/01/2023       Cardiac Enzymes:    Lab Results   Component Value Date    TROPHS 35 (H) 09/04/2023    TROPHS 33 (H) 09/04/2023    TROPHS 160 (H) 02/28/2023       Hepatic Function Panel:    Lab Results   Component Value Date    ALKPHOS 47 09/04/2023    ALT 12 09/04/2023    AST 19 09/04/2023    PROT 7.4 09/04/2023    BILITOT 0.5 09/04/2023    BILIDIR 0.0 09/04/2023         REVIEW OF SYSTEMS  Review of Systems   All other systems reviewed and are negative.      VITALS  There were no vitals filed for this visit.  Wt Readings from Last 4 Encounters:   04/18/24 78.5 kg (173 lb)   01/16/24 79.8 kg (176 lb)   10/17/23 77.6 kg (171 lb)   09/26/23 78.7 kg (173 lb 7 oz)       PHYSICAL EXAM  Constitutional:       Appearance: Healthy appearance. Not in distress.   Neck:      Vascular: No JVR. JVD normal.   Pulmonary:      Effort: Pulmonary effort is normal.      Breath sounds: Normal breath sounds. No wheezing. No rhonchi. No rales.   Chest:      Chest wall:  Not tender to palpatation.   Cardiovascular:      PMI at left midclavicular line. Normal rate. Regular rhythm. Normal S1. Normal S2.       Murmurs: There is no murmur.      No gallop.  No click. No rub.      Comments: PVC's  Pulses:     Intact distal pulses.   Edema:     Peripheral edema absent.   Abdominal:      General: Bowel sounds are normal.      Palpations: Abdomen is soft.      Tenderness: There is no abdominal tenderness.   Musculoskeletal: Normal range of motion.         General: No tenderness. Skin:     General: Skin is warm and dry.   Neurological:      General: No focal deficit present.      Mental Status: Alert and oriented to person, place and time.

## 2024-05-06 NOTE — PATIENT INSTRUCTIONS
Continue same medications/treatment.  Patient educated on proper medication use.  Patient educated on risk factor modification.  Please bring any lab results from other providers/physicians to your next appointment.    Please bring all medicines, vitamins, and herbal supplements with you when you come to the office.    Prescriptions will not be filled unless you are compliant with your follow up appointments or have a follow up appointment scheduled as per instruction of your physician. Refills should be requested at the time of your visit.    Follow up after monitor  HOLTER monitor to be worn for 48 hours   INCREASE Losartan from 25 mg daily to 50 mg daily. You can take 2 of the 25 mg tablets until gone, then start the new dose.     ROSIE CLARK RN, AM SCRIBING FOR AND IN THE PRESENCE OF JAIMEE ERAZO MD

## 2024-05-08 DIAGNOSIS — I10 BENIGN ESSENTIAL HYPERTENSION: ICD-10-CM

## 2024-05-08 RX ORDER — MIRABEGRON 50 MG/1
50 TABLET, EXTENDED RELEASE ORAL DAILY
Qty: 30 TABLET | Refills: 6 | Status: SHIPPED | OUTPATIENT
Start: 2024-05-08

## 2024-05-23 ENCOUNTER — ANCILLARY PROCEDURE (OUTPATIENT)
Dept: CARDIOLOGY | Facility: CLINIC | Age: 89
End: 2024-05-23
Payer: MEDICARE

## 2024-05-23 DIAGNOSIS — I49.3 PVC (PREMATURE VENTRICULAR CONTRACTION): ICD-10-CM

## 2024-05-23 PROCEDURE — 93227 XTRNL ECG REC<48 HR R&I: CPT | Performed by: INTERNAL MEDICINE

## 2024-05-23 PROCEDURE — 93225 XTRNL ECG REC<48 HRS REC: CPT | Performed by: INTERNAL MEDICINE

## 2024-05-30 ENCOUNTER — TELEPHONE (OUTPATIENT)
Dept: CARDIOLOGY | Facility: CLINIC | Age: 89
End: 2024-05-30
Payer: MEDICARE

## 2024-05-30 ENCOUNTER — DOCUMENTATION (OUTPATIENT)
Dept: CARDIOLOGY | Facility: CLINIC | Age: 89
End: 2024-05-30
Payer: MEDICARE

## 2024-05-30 DIAGNOSIS — R94.31 ABNORMAL HOLTER MONITOR FINDING: ICD-10-CM

## 2024-05-30 NOTE — TELEPHONE ENCOUNTER
I called patient per Dr Loja to let him know results of 48 hour Holter are abnormal. He will be referred to Dr Woodward in EP. Patient aware.

## 2024-06-04 ENCOUNTER — OFFICE VISIT (OUTPATIENT)
Dept: CARDIOLOGY | Facility: CLINIC | Age: 89
End: 2024-06-04
Payer: MEDICARE

## 2024-06-04 VITALS
HEART RATE: 80 BPM | RESPIRATION RATE: 16 BRPM | SYSTOLIC BLOOD PRESSURE: 143 MMHG | OXYGEN SATURATION: 98 % | DIASTOLIC BLOOD PRESSURE: 58 MMHG | BODY MASS INDEX: 25.62 KG/M2 | TEMPERATURE: 97.5 F | HEIGHT: 69 IN | WEIGHT: 173 LBS

## 2024-06-04 DIAGNOSIS — I49.3 FREQUENT UNIFOCAL PVCS: Primary | ICD-10-CM

## 2024-06-04 PROCEDURE — 3078F DIAST BP <80 MM HG: CPT | Performed by: INTERNAL MEDICINE

## 2024-06-04 PROCEDURE — 99205 OFFICE O/P NEW HI 60 MIN: CPT | Performed by: INTERNAL MEDICINE

## 2024-06-04 PROCEDURE — 1036F TOBACCO NON-USER: CPT | Performed by: INTERNAL MEDICINE

## 2024-06-04 PROCEDURE — 93005 ELECTROCARDIOGRAM TRACING: CPT | Performed by: INTERNAL MEDICINE

## 2024-06-04 PROCEDURE — 1157F ADVNC CARE PLAN IN RCRD: CPT | Performed by: INTERNAL MEDICINE

## 2024-06-04 PROCEDURE — 99215 OFFICE O/P EST HI 40 MIN: CPT | Performed by: INTERNAL MEDICINE

## 2024-06-04 PROCEDURE — 3077F SYST BP >= 140 MM HG: CPT | Performed by: INTERNAL MEDICINE

## 2024-06-04 PROCEDURE — 93010 ELECTROCARDIOGRAM REPORT: CPT | Performed by: INTERNAL MEDICINE

## 2024-06-04 RX ORDER — AMIODARONE HYDROCHLORIDE 200 MG/1
TABLET ORAL 2 TIMES DAILY
Qty: 88 TABLET | Refills: 0 | Status: SHIPPED | OUTPATIENT
Start: 2024-06-04 | End: 2024-08-17

## 2024-06-04 NOTE — Clinical Note
Thank you for the opportunity to see your patient. I have attached my consultation note for your review at your convenience.   Please do not hesitate to contact me, either through Epic or phone/email, if you have any questions.  Sincerely, Lashay Woodward Cell: [phone] Email: [email or web address]

## 2024-06-04 NOTE — PROGRESS NOTES
Referring Provider: Dr. Loja  Reason for Consult: Frequent PVCs and NSVT    History of Present Illness:      Brian Lennon is a 89 y.o. year old male patient with a history significant for CAD status post CABG, hypertension, hyperlipidemia, and nonischemic cardiomyopathy with recovered ejection fraction who is referred by Dr. Loja for frequent PVCs and nonsustained VT.    Patient has follow-up with Dr. Loja for his history of nonischemic cardiomyopathy and CAD status post CABG.  He had an echocardiogram in 2023 which showed an ejection fraction of 30%, but this had improved on his most recent echocardiogram in 2024 and his ejection fraction had normalized.  However, he recently began complaining of excessive fatigue with minimal exertion over the past 6 weeks.  A Holter monitor was obtained which showed very frequent PVCs and NSVT with an overall PVC burden of 36%.  Therefore, he was referred to EP for further evaluation.    Mr. Lennon continues to complain of significant fatigue with minimal exertion.  He denies any palpitations, lightheadedness, dizziness, syncope, or presyncope.  He has typically been quite active with working around the house and gardening outside, and has now had significant limitations to these activities due to his excessive fatigue.        Focused Cardiovascular Problem List:  CAD status post CABG  Hypertension  Hyperlipidemia  Nonischemic Cardiomyopathy with recovered ejection fraction      Past Medical and Surgical History:  Mr. Lennon  has no past medical history on file.    has a past surgical history that includes Other surgical history (05/08/2019); Other surgical history (05/08/2019); Other surgical history (05/08/2019); Other surgical history (03/31/2022); Other surgical history (03/31/2022); and Other surgical history (03/31/2022).    Social History:  Social History     Tobacco Use    Smoking status: Former     Types: Cigarettes    Smokeless tobacco: Never   Substance  "Use Topics    Alcohol use: Not Currently        Occupational History: Retired  Other: Lives at home in [place] with wife, single floor    Relevant Family History:   Family History   Problem Relation Name Age of Onset    Diabetes Mother         Allergies:  No Known Allergies     Medications:  Current Outpatient Medications   Medication Instructions    amiodarone (Pacerone) 200 mg tablet Take 1 tablet (200 mg) by mouth 2 times a day for 14 days, THEN 1 tablet (200 mg) once daily.    aspirin (ECOTRIN LOW STRENGTH) 81 mg, oral, Daily    atorvastatin (LIPITOR) 40 mg, oral, Nightly    carvedilol (COREG) 12.5 mg, oral, 2 times daily    furosemide (LASIX) 40 mg, oral, Daily    losartan (COZAAR) 50 mg, oral, Daily    meclizine (Antivert) 12.5 mg tablet 1 tablet, oral, 3 times daily PRN    mirabegron (MYRBETRIQ) 50 mg, oral, Daily    nitroglycerin (NITROSTAT) 0.4 mg, sublingual, Every 5 min PRN    propylene glycoL 0.6 % drops 2 drops, ophthalmic (eye), 2 times daily    vit A/vit C/vit E/zinc/copper (PRESERVISION AREDS ORAL) 1 tablet, oral, Every 12 hours    white petrolatum-mineral oiL 94-3 % ophthalmic ointment 1 Application, Both Eyes, Nightly         Objective   Physical Exam:  Last Recorded Vitals:      9/26/2023    10:58 AM 10/17/2023     1:25 PM 1/16/2024     1:38 PM 4/18/2024     2:45 PM 5/6/2024    10:06 AM 5/6/2024    10:32 AM 6/4/2024     9:54 AM   Vitals   Systolic 120 124 140 129 150 140 143   Diastolic 70 78 78 79 80 70 58   Heart Rate 60 67 65 54 56  80   Temp  36 °C (96.8 °F) 35.6 °C (96 °F) 36.1 °C (96.9 °F)   36.4 °C (97.5 °F)   Resp       16   Height (in) 1.753 m (5' 9\") 1.664 m (5' 5.5\") 1.657 m (5' 5.25\") 1.657 m (5' 5.25\") 1.753 m (5' 9\")  1.753 m (5' 9\")   Weight (lb) 173.44 171 176 173 175.3  173   BMI 25.61 kg/m2 28.02 kg/m2 29.06 kg/m2 28.57 kg/m2 25.89 kg/m2  25.55 kg/m2   BSA (m2) 1.96 m2 1.89 m2 1.92 m2 1.9 m2 1.97 m2  1.95 m2   Visit Report  Report Report Report Report Report Report    Visit " "Vitals  /58   Pulse 80   Temp 36.4 °C (97.5 °F)   Resp 16   Ht 1.753 m (5' 9\")   Wt 78.5 kg (173 lb)   SpO2 98%   BMI 25.55 kg/m²   Smoking Status Former   BSA 1.96 m²      Gen: NAD, sitting comfortably  HEENT: NC/AT  Chest: Device in situ in left upper chest, no overlying erythema or tenderness  Card: Bradycardic, regular rhythm, no m/r/g  Pulm: Clear to auscultation bilaterally  Ext: No LE edema  Neuro: No focal deficits    Diagnostic Results      My Interpretation of Reviewed Study(s):  Prior ECGs (reviewed and my interpretation):   6/4/2024: Sinus rhythm with bigeminy, parhisian PVCs, HR 82bpm    Cardiac Rhythm Monitors:  5/23/2024  Conclusion:   Sinus rhythm with no episodes of A-fib.  2.  Frequent PVCs with multiple ventricular runs, longest consisting of 21 beats consistent with nonsustained ventricular tachycardia there are also multiple bigeminy as well as trigeminy.  Based on these findings, patient will be referred for EP evaluation at the earliest possible appointment.    Echocardiography:  1/23/2024   1. Left ventricular systolic function is normal.   2. Spectral Doppler shows an impaired relaxation pattern of left ventricular diastolic filling.   3. Moderately enlarged right ventricle.   4. There is mildly reduced right ventricular systolic function.   5. Small mobile echodensity noted on the atrial side of the anterior leaflet of the mitral valve. Possible artifact but cannot exclude a vegetation. Consider LIAN to better characterize.   6. Trivial to 1+ tricuspid regurgitation.   7. Comparison study dated 2/28/2023 showed an estimated LV ejection fraction 30%. Mild mitral and tricuspid regurgitation. Estimated RVSP 41 mmHg.      Relevant Labs:  Lab Results   Component Value Date    CREATININE 1.84 (H) 09/04/2023    CREATININE CANCELED 03/02/2023    CREATININE 1.76 (H) 03/01/2023    K 4.5 09/04/2023    K CANCELED 03/02/2023    K 3.9 03/01/2023    HGB 11.1 (L) 09/04/2023    HGB CANCELED 03/02/2023 "    HGB 11.3 (L) 03/01/2023    INR 1.0 09/04/2023    INR 1.1 02/27/2023    INR 1.0 08/06/2022    AST 19 09/04/2023    AST 28 02/27/2023    AST 18 09/13/2022    ALT 12 09/04/2023    ALT 30 02/27/2023    ALT 15 09/13/2022       Assessment/Plan   Assessment and Plan:  Brian Lennon is a 89 y.o. year old male patient who is referred for management and evaluation of frequent PVCs and NSVT seen on recent monitor in the setting of a normal ejection fraction.  His symptoms of excessive fatigue are likely due to his frequent PVCs.  On exam, his pulse is effectively half of what it is on ECG, suggestive of non-perfusing PVCs.  Thus, he has affective bradycardia which is likely the etiology of his symptoms.    We discussed possible treatment options including catheter ablation directed at his PVCs versus medical management.  His PVCs appear to be originating from a para-hisian focus.  One of the downsides of catheter ablation with this type of PVC is that there may be damage to his native conduction system and render him pacemaker dependent after the procedure.  Therefore, I think it would be reasonable to trial medical management first before attempting catheter ablation.  Given his age, as well as his renal dysfunction, amiodarone would likely be the best choice here.  He has had baseline thyroid function and liver function test recently which were normal.  If amiodarone fails to suppress his frequent PVCs, or if he develops side effects to amiodarone, we could then consider catheter ablation, with the understanding that he may require pacemaker placement after the procedure.    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 Please do not hesitate to contact me with any further questions or concerns.    Lashay Woodward MD  Clinical Cardiac Electrophysiologist, [facility] [place]    of Medicine, [employer]  Director of Atrial Fibrillation Ablation, [facility]  Director of Ventricular Arrhythmias Research, [facility]  Office Phone Number: [phone]

## 2024-06-10 ENCOUNTER — OFFICE VISIT (OUTPATIENT)
Dept: CARDIOLOGY | Facility: CLINIC | Age: 89
End: 2024-06-10
Payer: MEDICARE

## 2024-06-10 VITALS
WEIGHT: 174.6 LBS | HEART RATE: 73 BPM | BODY MASS INDEX: 25.78 KG/M2 | DIASTOLIC BLOOD PRESSURE: 68 MMHG | SYSTOLIC BLOOD PRESSURE: 138 MMHG

## 2024-06-10 DIAGNOSIS — E78.2 MIXED HYPERLIPIDEMIA: ICD-10-CM

## 2024-06-10 DIAGNOSIS — I25.10 ARTERIOSCLEROTIC CARDIOVASCULAR DISEASE: ICD-10-CM

## 2024-06-10 DIAGNOSIS — Z87.891 FORMER SMOKER: ICD-10-CM

## 2024-06-10 DIAGNOSIS — I49.3 PVC (PREMATURE VENTRICULAR CONTRACTION): ICD-10-CM

## 2024-06-10 DIAGNOSIS — I10 BENIGN ESSENTIAL HYPERTENSION: ICD-10-CM

## 2024-06-10 DIAGNOSIS — I50.42 CHRONIC COMBINED SYSTOLIC AND DIASTOLIC CONGESTIVE HEART FAILURE (MULTI): ICD-10-CM

## 2024-06-10 PROCEDURE — 3078F DIAST BP <80 MM HG: CPT | Performed by: INTERNAL MEDICINE

## 2024-06-10 PROCEDURE — 1036F TOBACCO NON-USER: CPT | Performed by: INTERNAL MEDICINE

## 2024-06-10 PROCEDURE — 3075F SYST BP GE 130 - 139MM HG: CPT | Performed by: INTERNAL MEDICINE

## 2024-06-10 PROCEDURE — 1159F MED LIST DOCD IN RCRD: CPT | Performed by: INTERNAL MEDICINE

## 2024-06-10 PROCEDURE — 1157F ADVNC CARE PLAN IN RCRD: CPT | Performed by: INTERNAL MEDICINE

## 2024-06-10 PROCEDURE — 99214 OFFICE O/P EST MOD 30 MIN: CPT | Performed by: INTERNAL MEDICINE

## 2024-06-10 ASSESSMENT — ENCOUNTER SYMPTOMS
RESPIRATORY NEGATIVE: 1
CONSTITUTIONAL NEGATIVE: 1
CARDIOVASCULAR NEGATIVE: 1
NEUROLOGICAL NEGATIVE: 1

## 2024-06-10 NOTE — PROGRESS NOTES
CARDIOLOGY OFFICE VISIT      CHIEF COMPLAINT  No chief complaint on file.       HISTORY OF PRESENT ILLNESS  Brian Lennon is a 89 y.o. year old male patient with a history of CAD and remote CABG, hypertension, dyslipidemia and nonischemic cardiomyopathy with initial EF of about 30% which improved to low normal LV function on follow-up echo in January 2024 with moderate right ventricular enlargement and mildly reduced RV systolic function.  He had history of frequent PVCs and he complained of increased fatigue and had a repeat Holter monitor which showed underlying sinus rhythm with no episodes of atrial fibrillation.  He had frequent PVCs which represented about 36% of the total recording with multiple episodes of nonsustained VT and frequent bigeminy and trigeminy.  He was subsequently referred for EP evaluation and was felt to be better on medical therapy and was started on amiodarone by Dr. Woodward.  The patient feels much better since then and he has follow-up appointment pending.  He continues to deny any chest pain.  Recent labs showed BN peptide is 319 which is down from 1005 in February 2023    ASSESSMENT AND PLAN  1.  Nonischemic cardiomyopathy: With normalized LV function as noted above on repeat echocardiogram and NYHA class II, continue current medications.  2.  Frequent PVCs/nonsustained VT: Patient is doing better on current dose of amiodarone and he will follow-up with EP as scheduled.  3.  CAD s/p remote CABG with no recurrent chest pain.  4.  Hypertension: Blood pressure is well-controlled current medications which we will continue.    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    Social History  Social History     Tobacco Use    Smoking status: Former     Types: Cigarettes    Smokeless tobacco: Never   Vaping Use    Vaping status: Never Used   Substance Use Topics    Alcohol use: Not Currently    Drug use: Never       Family History     Family History   Problem Relation Name Age of Onset    Diabetes Mother          Allergies:  No Known Allergies     Outpatient Medications:  Current Outpatient Medications   Medication Instructions    amiodarone (Pacerone) 200 mg tablet Take 1 tablet (200 mg) by mouth 2 times a day for 14 days, THEN 1 tablet (200 mg) once daily.    aspirin (ECOTRIN LOW STRENGTH) 81 mg, oral, Daily    atorvastatin (LIPITOR) 40 mg, oral, Nightly    carvedilol (COREG) 12.5 mg, oral, 2 times daily    furosemide (LASIX) 40 mg, oral, Daily    losartan (COZAAR) 50 mg, oral, Daily    meclizine (Antivert) 12.5 mg tablet 1 tablet, oral, 3 times daily PRN    mirabegron (MYRBETRIQ) 50 mg, oral, Daily    nitroglycerin (NITROSTAT) 0.4 mg, sublingual, Every 5 min PRN    propylene glycoL 0.6 % drops 2 drops, ophthalmic (eye), 2 times daily    vit A/vit C/vit E/zinc/copper (PRESERVISION AREDS ORAL) 1 tablet, oral, Every 12 hours    white petrolatum-mineral oiL 94-3 % ophthalmic ointment 1 Application, Both Eyes, Nightly        Recent Lab Results:    CBC:   Lab Results   Component Value Date    WBC 7.8 09/04/2023    RBC 3.64 (L) 09/04/2023    HGB 11.1 (L) 09/04/2023    HCT 33.6 (L) 09/04/2023     09/04/2023        CMP:    Lab Results   Component Value Date     (L) 09/04/2023    K 4.5 09/04/2023     09/04/2023    CO2 25 09/04/2023    BUN 44 (H) 09/04/2023    CREATININE 1.84 (H) 09/04/2023    GLUCOSE 99 09/04/2023    CALCIUM 8.9 09/04/2023       Magnesium:    Lab Results   Component Value Date    MG 1.59 (L) 02/28/2023       Lipid Profile:    Lab Results  "  Component Value Date    TRIG 62 09/13/2022    HDL 46.0 09/13/2022       TSH:    Lab Results   Component Value Date    TSH 1.75 10/21/2021       BNP:   Lab Results   Component Value Date     (H) 01/16/2024        PT/INR:    Lab Results   Component Value Date    PROTIME 11.6 09/04/2023    INR 1.0 09/04/2023       HgBA1c:    No results found for: \"HGBA1C\"    BMP:  Lab Results   Component Value Date     (L) 09/04/2023    NA CANCELED 03/02/2023     (L) 03/01/2023    K 4.5 09/04/2023    K CANCELED 03/02/2023    K 3.9 03/01/2023     09/04/2023    CL CANCELED 03/02/2023     03/01/2023    CO2 25 09/04/2023    CO2 CANCELED 03/02/2023    CO2 27 03/01/2023    BUN 44 (H) 09/04/2023    BUN CANCELED 03/02/2023    BUN 34 (H) 03/01/2023    CREATININE 1.84 (H) 09/04/2023    CREATININE CANCELED 03/02/2023    CREATININE 1.76 (H) 03/01/2023       CBC:  Lab Results   Component Value Date    WBC 7.8 09/04/2023    WBC CANCELED 03/02/2023    WBC 6.6 03/01/2023    RBC 3.64 (L) 09/04/2023    RBC CANCELED 03/02/2023    RBC 3.90 (L) 03/01/2023    HGB 11.1 (L) 09/04/2023    HGB CANCELED 03/02/2023    HGB 11.3 (L) 03/01/2023    HCT 33.6 (L) 09/04/2023    HCT CANCELED 03/02/2023    HCT 34.3 (L) 03/01/2023    MCV 92 09/04/2023    MCV CANCELED 03/02/2023    MCV 88 03/01/2023    MCHC 33.0 09/04/2023    MCHC CANCELED 03/02/2023    MCHC 32.9 03/01/2023    RDW 13.1 09/04/2023    RDW CANCELED 03/02/2023    RDW 13.3 03/01/2023     09/04/2023    PLT CANCELED 03/02/2023     03/01/2023       Cardiac Enzymes:    Lab Results   Component Value Date    TROPHS 35 (H) 09/04/2023    TROPHS 33 (H) 09/04/2023    TROPHS 160 (H) 02/28/2023       Hepatic Function Panel:    Lab Results   Component Value Date    ALKPHOS 47 09/04/2023    ALT 12 09/04/2023    AST 19 09/04/2023    PROT 7.4 09/04/2023    BILITOT 0.5 09/04/2023    BILIDIR 0.0 09/04/2023         REVIEW OF SYSTEMS  Review of Systems   Constitutional: Negative. "   Cardiovascular: Negative.    Respiratory: Negative.     Neurological: Negative.    All other systems reviewed and are negative.      VITALS  There were no vitals filed for this visit.  Wt Readings from Last 4 Encounters:   06/04/24 78.5 kg (173 lb)   05/06/24 79.5 kg (175 lb 4.8 oz)   04/18/24 78.5 kg (173 lb)   01/16/24 79.8 kg (176 lb)       PHYSICAL EXAM  Constitutional:       Appearance: Healthy appearance.   Eyes:      Pupils: Pupils are equal, round, and reactive to light.   Pulmonary:      Effort: Pulmonary effort is normal.      Breath sounds: Normal breath sounds.   Cardiovascular:      PMI at left midclavicular line. Normal rate. Regular rhythm.      Murmurs: There is no murmur.      No gallop.  No click. No rub.   Pulses:     Intact distal pulses.   Musculoskeletal: Normal range of motion.      Cervical back: Normal range of motion. Skin:     General: Skin is warm and dry.   Neurological:      General: No focal deficit present.      Mental Status: Alert and oriented to person, place and time.

## 2024-07-09 ENCOUNTER — APPOINTMENT (OUTPATIENT)
Dept: CARDIOLOGY | Facility: CLINIC | Age: 89
End: 2024-07-09
Payer: MEDICARE

## 2024-07-16 ENCOUNTER — OFFICE VISIT (OUTPATIENT)
Dept: CARDIOLOGY | Facility: CLINIC | Age: 89
End: 2024-07-16
Payer: MEDICARE

## 2024-07-16 ENCOUNTER — LAB (OUTPATIENT)
Dept: LAB | Facility: LAB | Age: 89
End: 2024-07-16
Payer: MEDICARE

## 2024-07-16 VITALS
HEIGHT: 69 IN | HEART RATE: 68 BPM | WEIGHT: 172 LBS | RESPIRATION RATE: 14 BRPM | DIASTOLIC BLOOD PRESSURE: 73 MMHG | SYSTOLIC BLOOD PRESSURE: 141 MMHG | TEMPERATURE: 96.4 F | OXYGEN SATURATION: 98 % | BODY MASS INDEX: 25.48 KG/M2

## 2024-07-16 DIAGNOSIS — Z79.899 LONG TERM CURRENT USE OF AMIODARONE: Primary | ICD-10-CM

## 2024-07-16 DIAGNOSIS — Z79.899 LONG TERM CURRENT USE OF AMIODARONE: ICD-10-CM

## 2024-07-16 DIAGNOSIS — I49.3 PVC (PREMATURE VENTRICULAR CONTRACTION): ICD-10-CM

## 2024-07-16 LAB
ALBUMIN SERPL BCP-MCNC: 3.7 G/DL (ref 3.4–5)
ALP SERPL-CCNC: 56 U/L (ref 33–136)
ALT SERPL W P-5'-P-CCNC: 22 U/L (ref 10–52)
ANION GAP SERPL CALC-SCNC: 10 MMOL/L (ref 10–20)
AST SERPL W P-5'-P-CCNC: 25 U/L (ref 9–39)
BILIRUB SERPL-MCNC: 0.4 MG/DL (ref 0–1.2)
BUN SERPL-MCNC: 53 MG/DL (ref 6–23)
CALCIUM SERPL-MCNC: 8.6 MG/DL (ref 8.6–10.3)
CHLORIDE SERPL-SCNC: 103 MMOL/L (ref 98–107)
CO2 SERPL-SCNC: 28 MMOL/L (ref 21–32)
CREAT SERPL-MCNC: 2.34 MG/DL (ref 0.5–1.3)
EGFRCR SERPLBLD CKD-EPI 2021: 26 ML/MIN/1.73M*2
GLUCOSE SERPL-MCNC: 96 MG/DL (ref 74–99)
POTASSIUM SERPL-SCNC: 4.6 MMOL/L (ref 3.5–5.3)
PROT SERPL-MCNC: 7.3 G/DL (ref 6.4–8.2)
SODIUM SERPL-SCNC: 136 MMOL/L (ref 136–145)
TSH SERPL-ACNC: 2.33 MIU/L (ref 0.44–3.98)

## 2024-07-16 PROCEDURE — 3078F DIAST BP <80 MM HG: CPT | Performed by: INTERNAL MEDICINE

## 2024-07-16 PROCEDURE — 1159F MED LIST DOCD IN RCRD: CPT | Performed by: INTERNAL MEDICINE

## 2024-07-16 PROCEDURE — 93005 ELECTROCARDIOGRAM TRACING: CPT | Performed by: INTERNAL MEDICINE

## 2024-07-16 PROCEDURE — 80053 COMPREHEN METABOLIC PANEL: CPT

## 2024-07-16 PROCEDURE — 3077F SYST BP >= 140 MM HG: CPT | Performed by: INTERNAL MEDICINE

## 2024-07-16 PROCEDURE — 84443 ASSAY THYROID STIM HORMONE: CPT

## 2024-07-16 PROCEDURE — 99214 OFFICE O/P EST MOD 30 MIN: CPT | Performed by: INTERNAL MEDICINE

## 2024-07-16 PROCEDURE — 1157F ADVNC CARE PLAN IN RCRD: CPT | Performed by: INTERNAL MEDICINE

## 2024-07-16 PROCEDURE — G2211 COMPLEX E/M VISIT ADD ON: HCPCS | Performed by: INTERNAL MEDICINE

## 2024-07-16 PROCEDURE — 36415 COLL VENOUS BLD VENIPUNCTURE: CPT

## 2024-07-16 PROCEDURE — 1036F TOBACCO NON-USER: CPT | Performed by: INTERNAL MEDICINE

## 2024-07-16 PROCEDURE — 93010 ELECTROCARDIOGRAM REPORT: CPT | Performed by: INTERNAL MEDICINE

## 2024-07-16 ASSESSMENT — PATIENT HEALTH QUESTIONNAIRE - PHQ9
1. LITTLE INTEREST OR PLEASURE IN DOING THINGS: NOT AT ALL
2. FEELING DOWN, DEPRESSED OR HOPELESS: NOT AT ALL
SUM OF ALL RESPONSES TO PHQ9 QUESTIONS 1 AND 2: 0

## 2024-07-16 NOTE — PROGRESS NOTES
Referring Provider: Dr. Loja  Reason for Consult: Frequent PVCs and NSVT    History of Present Illness:      Brian Lennon is a 89 y.o. year old male patient with a history significant for CAD status post CABG, hypertension, hyperlipidemia, and nonischemic cardiomyopathy with recovered ejection fraction who is referred by Dr. Loja for frequent PVCs and nonsustained VT.    Patient has follow-up with Dr. Loja for his history of nonischemic cardiomyopathy and CAD status post CABG.  He had an echocardiogram in 2023 which showed an ejection fraction of 30%, but this had improved on his most recent echocardiogram in 2024 and his ejection fraction had normalized.  However, he recently began complaining of excessive fatigue with minimal exertion over the past 6 weeks.  A Holter monitor was obtained which showed very frequent PVCs and NSVT with an overall PVC burden of 36%.  Therefore, he was referred to EP for further evaluation.    At his last visit, we started amiodarone for PVC suppression.  He has felt much better since initiation of amiodarone.  He no longer has significant shortness of breath and fatigue.  He is back to his baseline.        Focused Cardiovascular Problem List:  CAD status post CABG  Hypertension  Hyperlipidemia  Nonischemic Cardiomyopathy with recovered ejection fraction  Frequent para-hisian PVCs      Past Medical and Surgical History:  Mr. Lennon  has a past medical history of Coronary artery disease and Hypertension.    has a past surgical history that includes Other surgical history (05/08/2019); Other surgical history (05/08/2019); Other surgical history (05/08/2019); Other surgical history (03/31/2022); Other surgical history (03/31/2022); and Other surgical history (03/31/2022).    Social History:  Social History     Tobacco Use    Smoking status: Former     Types: Cigarettes    Smokeless tobacco: Never   Substance Use Topics    Alcohol use: Not Currently        Occupational  "History: Retired  Other: Lives at home in [place] with wife, single floor    Relevant Family History:   Family History   Problem Relation Name Age of Onset    Diabetes Mother         Allergies:  No Known Allergies     Medications:  Current Outpatient Medications   Medication Instructions    amiodarone (Pacerone) 200 mg tablet Take 1 tablet (200 mg) by mouth 2 times a day for 14 days, THEN 1 tablet (200 mg) once daily.    aspirin (ECOTRIN LOW STRENGTH) 81 mg, oral, Daily    atorvastatin (LIPITOR) 40 mg, oral, Nightly    carvedilol (COREG) 12.5 mg, oral, 2 times daily    furosemide (LASIX) 40 mg, oral, Daily    losartan (COZAAR) 50 mg, oral, Daily    meclizine (Antivert) 12.5 mg tablet 1 tablet, oral, 3 times daily PRN    mirabegron (MYRBETRIQ) 50 mg, oral, Daily    nitroglycerin (NITROSTAT) 0.4 mg, sublingual, Every 5 min PRN    propylene glycoL 0.6 % drops 2 drops, ophthalmic (eye), 2 times daily    vit A/vit C/vit E/zinc/copper (PRESERVISION AREDS ORAL) 1 tablet, oral, Every 12 hours    white petrolatum-mineral oiL 94-3 % ophthalmic ointment 1 Application, Both Eyes, Nightly         Objective   Physical Exam:  Last Recorded Vitals:      1/16/2024     1:38 PM 4/18/2024     2:45 PM 5/6/2024    10:06 AM 5/6/2024    10:32 AM 6/4/2024     9:54 AM 6/10/2024     9:57 AM 7/16/2024     9:48 AM   Vitals   Systolic 140 129 150 140 143 138 141   Diastolic 78 79 80 70 58 68 73   Heart Rate 65 54 56  80 73 68   Temp 35.6 °C (96 °F) 36.1 °C (96.9 °F)   36.4 °C (97.5 °F)  35.8 °C (96.4 °F)   Resp     16  14   Height (in) 1.657 m (5' 5.25\") 1.657 m (5' 5.25\") 1.753 m (5' 9\")  1.753 m (5' 9\")  1.753 m (5' 9\")   Weight (lb) 176 173 175.3  173 174.6 172   BMI 29.06 kg/m2 28.57 kg/m2 25.89 kg/m2  25.55 kg/m2 25.78 kg/m2 25.4 kg/m2   BSA (m2) 1.92 m2 1.9 m2 1.97 m2  1.95 m2 1.96 m2 1.95 m2   Visit Report Report Report Report Report Report Report Report    Visit Vitals  /73   Pulse 68   Temp 35.8 °C (96.4 °F)   Resp 14   Ht 1.753 m " "(5' 9\")   Wt 78 kg (172 lb)   SpO2 98%   BMI 25.40 kg/m²   Smoking Status Former   BSA 1.95 m²      Gen: NAD, sitting comfortably  HEENT: NC/AT  Chest: Device in situ in left upper chest, no overlying erythema or tenderness  Card: Regular rate and rhythm, no murmurs rubs or gallops  Pulm: Clear to auscultation bilaterally  Ext: No LE edema  Neuro: No focal deficits    Diagnostic Results      My Interpretation of Reviewed Study(s):  Prior ECGs (reviewed and my interpretation):  7/16/2024: Sinus rhythm, first-degree AV block, nonspecific T wave abnormality  6/4/2024: Sinus rhythm with bigeminy, parhisian PVCs, HR 82bpm    Cardiac Rhythm Monitors:  5/23/2024  Conclusion:   Sinus rhythm with no episodes of A-fib.  2.  Frequent PVCs with multiple ventricular runs, longest consisting of 21 beats consistent with nonsustained ventricular tachycardia there are also multiple bigeminy as well as trigeminy.  Based on these findings, patient will be referred for EP evaluation at the earliest possible appointment.    Echocardiography:  1/23/2024   1. Left ventricular systolic function is normal.   2. Spectral Doppler shows an impaired relaxation pattern of left ventricular diastolic filling.   3. Moderately enlarged right ventricle.   4. There is mildly reduced right ventricular systolic function.   5. Small mobile echodensity noted on the atrial side of the anterior leaflet of the mitral valve. Possible artifact but cannot exclude a vegetation. Consider LIAN to better characterize.   6. Trivial to 1+ tricuspid regurgitation.   7. Comparison study dated 2/28/2023 showed an estimated LV ejection fraction 30%. Mild mitral and tricuspid regurgitation. Estimated RVSP 41 mmHg.      Relevant Labs:  Lab Results   Component Value Date    CREATININE 1.84 (H) 09/04/2023    CREATININE CANCELED 03/02/2023    CREATININE 1.76 (H) 03/01/2023    K 4.5 09/04/2023    K CANCELED 03/02/2023    K 3.9 03/01/2023    HGB 11.1 (L) 09/04/2023    HGB " CANCELED 03/02/2023    HGB 11.3 (L) 03/01/2023    INR 1.0 09/04/2023    INR 1.1 02/27/2023    INR 1.0 08/06/2022    AST 19 09/04/2023    AST 28 02/27/2023    AST 18 09/13/2022    ALT 12 09/04/2023    ALT 30 02/27/2023    ALT 15 09/13/2022       Assessment/Plan   Assessment and Plan:  Brian Lennon is a 89 y.o. year old male patient who was referred for management and evaluation of frequent PVCs and NSVT seen on recent monitor in the setting of a normal ejection fraction.  His symptoms of excessive fatigue were likely due to his frequent PVCs.  On exam, his pulse was effectively half of what it is on ECG, suggestive of non-perfusing PVCs.  Thus, he has effective bradycardia which was likely the etiology of his symptoms.    He has been doing very well since initiation of amiodarone.  His EKG today shows no PVCs.  We will repeat another 24-hour monitor to confirm that his PVC burden is effectively suppressed.  Additionally, I will order a comprehensive metabolic panel and TSH for baseline amiodarone monitoring.  He should get yearly PFTs as well starting next year.    #Frequent unifocal PVCs from a para-hisian focus  -Continue amiodarone 200 mg daily  -Repeat 24-hour monitor ordered  -Comprehensive metabolic panel and TSH ordered as well  - Obtain PFTs next year  - Follow-up in 9 months         Return to Clinic: Patient should return to the EP Clinic 9 months     Thank you very much for allowing me to participate in the care of this patient. Please do not hesitate to contact me with any further questions or concerns.    Lashay Woodward MD  Clinical Cardiac Electrophysiologist, [facility] [place]    of Medicine, [employer]  Director of Atrial Fibrillation Ablation, [facility]  Director of Ventricular Arrhythmias Research, [facility]  Office Phone Number: [phone]

## 2024-07-18 ENCOUNTER — APPOINTMENT (OUTPATIENT)
Dept: PRIMARY CARE | Facility: CLINIC | Age: 89
End: 2024-07-18
Payer: MEDICARE

## 2024-07-18 VITALS
HEIGHT: 69 IN | HEART RATE: 56 BPM | DIASTOLIC BLOOD PRESSURE: 80 MMHG | TEMPERATURE: 96.9 F | WEIGHT: 171 LBS | SYSTOLIC BLOOD PRESSURE: 140 MMHG | BODY MASS INDEX: 25.33 KG/M2

## 2024-07-18 DIAGNOSIS — I49.3 PVC (PREMATURE VENTRICULAR CONTRACTION): ICD-10-CM

## 2024-07-18 DIAGNOSIS — N18.4 CHRONIC RENAL DISEASE, STAGE IV (MULTI): Primary | ICD-10-CM

## 2024-07-18 DIAGNOSIS — I10 BENIGN ESSENTIAL HYPERTENSION: ICD-10-CM

## 2024-07-18 DIAGNOSIS — D47.2 MGUS (MONOCLONAL GAMMOPATHY OF UNKNOWN SIGNIFICANCE): ICD-10-CM

## 2024-07-18 DIAGNOSIS — I50.42 CHRONIC COMBINED SYSTOLIC AND DIASTOLIC CONGESTIVE HEART FAILURE (MULTI): ICD-10-CM

## 2024-07-18 PROCEDURE — 1159F MED LIST DOCD IN RCRD: CPT | Performed by: INTERNAL MEDICINE

## 2024-07-18 PROCEDURE — 1036F TOBACCO NON-USER: CPT | Performed by: INTERNAL MEDICINE

## 2024-07-18 PROCEDURE — 3079F DIAST BP 80-89 MM HG: CPT | Performed by: INTERNAL MEDICINE

## 2024-07-18 PROCEDURE — 1160F RVW MEDS BY RX/DR IN RCRD: CPT | Performed by: INTERNAL MEDICINE

## 2024-07-18 PROCEDURE — 1157F ADVNC CARE PLAN IN RCRD: CPT | Performed by: INTERNAL MEDICINE

## 2024-07-18 PROCEDURE — 99213 OFFICE O/P EST LOW 20 MIN: CPT | Performed by: INTERNAL MEDICINE

## 2024-07-18 PROCEDURE — 3077F SYST BP >= 140 MM HG: CPT | Performed by: INTERNAL MEDICINE

## 2024-07-18 ASSESSMENT — ENCOUNTER SYMPTOMS
RESPIRATORY NEGATIVE: 1
DIZZINESS: 0
ARTHRALGIAS: 1
CONSTITUTIONAL NEGATIVE: 1
GASTROINTESTINAL NEGATIVE: 1
EYES NEGATIVE: 1
CARDIOVASCULAR NEGATIVE: 1

## 2024-07-18 NOTE — PROGRESS NOTES
Subjective   Patient ID: Brian Lennon is a 89 y.o. male who presents for Follow-up (3 mo fu).  HPI  Hypertension  This is a chronic problem. The current episode started more than 1 year ago. The problem has been resolved since onset. The problem is controlled. Pertinent negatives include no anxiety or headaches. Past treatments include beta blockers. The current treatment provides significant improvement. Hypertensive end-organ damage includes kidney disease. Identifiable causes of hypertension include chronic renal disease.   Heart Problem  This is a chronic problem. The current episode started more than 1 year ago. The problem occurs rarely. The problem has been resolved. Associated symptoms include arthralgias. Pertinent negatives include no abdominal pain, anorexia, congestion or headaches. The treatment provided significant relief  Past Medical History  History reviewed. No pertinent past medical history.    Social History  Social History     Tobacco Use    Smoking status: Former     Types: Cigarettes    Smokeless tobacco: Never   Vaping Use    Vaping status: Never Used   Substance Use Topics    Alcohol use: Not Currently    Drug use: Never       Family History     Family History   Problem Relation Name Age of Onset    Diabetes Mother         Allergies:  No Known Allergies     Outpatient Medications:  Current Outpatient Medications   Medication Instructions    amiodarone (Pacerone) 200 mg tablet Take 1 tablet (200 mg) by mouth 2 times a day for 14 days, THEN 1 tablet (200 mg) once daily.    aspirin (ECOTRIN LOW STRENGTH) 81 mg, oral, Daily    atorvastatin (LIPITOR) 40 mg, oral, Nightly    carvedilol (COREG) 12.5 mg, oral, 2 times daily    furosemide (LASIX) 40 mg, oral, Daily    losartan (COZAAR) 50 mg, oral, Daily    meclizine (Antivert) 12.5 mg tablet 1 tablet, oral, 3 times daily PRN    mirabegron (MYRBETRIQ) 50 mg, oral, Daily    nitroglycerin (NITROSTAT) 0.4 mg, sublingual, Every 5 min PRN    propylene  "glycoL 0.6 % drops 2 drops, ophthalmic (eye), 2 times daily    vit A/vit C/vit E/zinc/copper (PRESERVISION AREDS ORAL) 1 tablet, oral, Every 12 hours    white petrolatum-mineral oiL 94-3 % ophthalmic ointment 1 Application, Both Eyes, Nightly        Review of Systems   Constitutional: Negative.    HENT: Negative.     Eyes: Negative.    Respiratory: Negative.     Cardiovascular: Negative.    Gastrointestinal: Negative.    Musculoskeletal:  Positive for arthralgias.   Neurological:  Negative for dizziness.         Objective       Physical Exam  Vitals reviewed.   Constitutional:       Appearance: Normal appearance. He is normal weight.   HENT:      Head: Normocephalic.   Eyes:      Conjunctiva/sclera: Conjunctivae normal.   Cardiovascular:      Rate and Rhythm: Normal rate. Rhythm regularly irregular.      Pulses: Normal pulses.   Pulmonary:      Effort: Pulmonary effort is normal.      Breath sounds: Normal breath sounds.   Abdominal:      Palpations: Abdomen is soft.   Musculoskeletal:         General: Normal range of motion.      Cervical back: Neck supple.   Skin:     General: Skin is warm and dry.   Neurological:      General: No focal deficit present.   Psychiatric:         Mood and Affect: Mood normal.       /80 (BP Location: Right arm, Patient Position: Sitting, BP Cuff Size: Adult)   Pulse 56   Temp 36.1 °C (96.9 °F) (Temporal)   Ht 1.753 m (5' 9\")   Wt 77.6 kg (171 lb)   BMI 25.25 kg/m²      Assessment/Plan   Problem List Items Addressed This Visit       Benign essential hypertension    MGUS (monoclonal gammopathy of unknown significance)    Chronic combined systolic and diastolic congestive heart failure (Multi)    PVC (premature ventricular contraction)    Chronic renal disease, stage IV (Multi) - Primary   Patient is frustrated because physicians keep on sending him to another physician send this is really frustrating situation.  I went through all the reports, patient is on amiodarone I think " it was more of ventricular arrhythmias.  Echo shows ejection fraction to be low in the beginning of the year, patient has MGUS, has been having marginal cell lymphoma also, kidney functions are worsening, GFR less than 30 this time.  It used to stay around 35-40.  Explained about that, he is a survivor of prostate cancer, as usual  Clinic has been sending him to multiple doctors and I told patient it would not happen everything will happen through me I told.  Seen by endocrine for some reason nothing to be concerned about, PVCs are not obvious recent Holter was reviewed, no atrial fibrillation identified.  Will have a creatinine monitoring.  He remains on current therapeutics including Lasix, carvedilol, amiodarone TSH has been checked and DLCO will be done in a timely manner.  Myrbetriq will be given by me.  He is aging up, no fall, no shortness of breath.  Medications are reviewed clinical condition was assessed, follow-up in 3 months

## 2024-08-13 ENCOUNTER — ANCILLARY PROCEDURE (OUTPATIENT)
Dept: CARDIOLOGY | Facility: CLINIC | Age: 89
End: 2024-08-13
Payer: MEDICARE

## 2024-08-13 DIAGNOSIS — I49.3 PVC (PREMATURE VENTRICULAR CONTRACTION): ICD-10-CM

## 2024-08-13 PROCEDURE — 93225 XTRNL ECG REC<48 HRS REC: CPT | Performed by: INTERNAL MEDICINE

## 2024-08-13 PROCEDURE — 93227 XTRNL ECG REC<48 HR R&I: CPT | Performed by: INTERNAL MEDICINE

## 2024-08-14 DIAGNOSIS — I49.3 FREQUENT UNIFOCAL PVCS: ICD-10-CM

## 2024-08-14 RX ORDER — AMIODARONE HYDROCHLORIDE 200 MG/1
TABLET ORAL
Qty: 88 TABLET | Refills: 0 | Status: SHIPPED | OUTPATIENT
Start: 2024-08-14

## 2024-08-16 DIAGNOSIS — I10 BENIGN ESSENTIAL HYPERTENSION: ICD-10-CM

## 2024-08-16 RX ORDER — CARVEDILOL 12.5 MG/1
12.5 TABLET ORAL 2 TIMES DAILY
Qty: 180 TABLET | Refills: 1 | Status: SHIPPED | OUTPATIENT
Start: 2024-08-16

## 2024-08-24 DIAGNOSIS — I50.21 ACUTE SYSTOLIC CONGESTIVE HEART FAILURE (MULTI): ICD-10-CM

## 2024-08-24 RX ORDER — FUROSEMIDE 40 MG/1
40 TABLET ORAL DAILY
Qty: 90 TABLET | Refills: 1 | Status: SHIPPED | OUTPATIENT
Start: 2024-08-24

## 2024-09-09 ENCOUNTER — APPOINTMENT (OUTPATIENT)
Dept: CARDIOLOGY | Facility: CLINIC | Age: 89
End: 2024-09-09
Payer: MEDICARE

## 2024-09-09 VITALS
DIASTOLIC BLOOD PRESSURE: 80 MMHG | WEIGHT: 173 LBS | SYSTOLIC BLOOD PRESSURE: 160 MMHG | HEART RATE: 72 BPM | HEIGHT: 69 IN | BODY MASS INDEX: 25.62 KG/M2

## 2024-09-09 DIAGNOSIS — G47.33 OBSTRUCTIVE SLEEP APNEA SYNDROME: ICD-10-CM

## 2024-09-09 DIAGNOSIS — I10 BENIGN ESSENTIAL HYPERTENSION: ICD-10-CM

## 2024-09-09 DIAGNOSIS — E78.2 MIXED HYPERLIPIDEMIA: ICD-10-CM

## 2024-09-09 DIAGNOSIS — I50.42 CHRONIC COMBINED SYSTOLIC AND DIASTOLIC CONGESTIVE HEART FAILURE (MULTI): ICD-10-CM

## 2024-09-09 DIAGNOSIS — I49.3 PVC (PREMATURE VENTRICULAR CONTRACTION): ICD-10-CM
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
## 2024-09-09 DIAGNOSIS — Z87.891 FORMER SMOKER: ICD-10-CM

## 2024-09-09 DIAGNOSIS — I25.10 ARTERIOSCLEROTIC CARDIOVASCULAR DISEASE: ICD-10-CM

## 2024-09-09 PROCEDURE — 1036F TOBACCO NON-USER: CPT | Performed by: INTERNAL MEDICINE

## 2024-09-09 PROCEDURE — 1157F ADVNC CARE PLAN IN RCRD: CPT | Performed by: INTERNAL MEDICINE

## 2024-09-09 PROCEDURE — 3077F SYST BP >= 140 MM HG: CPT | Performed by: INTERNAL MEDICINE

## 2024-09-09 PROCEDURE — 1159F MED LIST DOCD IN RCRD: CPT | Performed by: INTERNAL MEDICINE

## 2024-09-09 PROCEDURE — 3078F DIAST BP <80 MM HG: CPT | Performed by: INTERNAL MEDICINE

## 2024-09-09 PROCEDURE — 99214 OFFICE O/P EST MOD 30 MIN: CPT | Performed by: INTERNAL MEDICINE

## 2024-09-09 RX ORDER — CALCIUM CARBONATE/VITAMIN D3 500-10/5ML
400 LIQUID (ML) ORAL DAILY
Qty: 90 CAPSULE | Refills: 0 | Status: SHIPPED | OUTPATIENT
Start: 2024-09-09 | End: 2024-12-08

## 2024-09-09 RX ORDER — HYDROCHLOROTHIAZIDE 12.5 MG/1
12.5 TABLET ORAL DAILY
Qty: 90 TABLET | Refills: 3 | Status: SHIPPED | OUTPATIENT
Start: 2024-09-09 | End: 2025-09-09

## 2024-09-09 ASSESSMENT — ENCOUNTER SYMPTOMS
NEUROLOGICAL NEGATIVE: 1
CARDIOVASCULAR NEGATIVE: 1
CONSTITUTIONAL NEGATIVE: 1
RESPIRATORY NEGATIVE: 1

## 2024-09-09 NOTE — PATIENT INSTRUCTIONS
3-4 week visit  BMP before OV  Start Magnesium Oxide 400mg one a day6  Patient educated on proper medication use.   Patient educated on risk factor modification.   Please bring any lab results from other providers / physicians to your next appointment.     Please bring all medicines, vitamins, and herbal supplements with you when you come to the office.     Prescriptions will not be filled unless you are compliant with your follow up appointments or have a follow up appointment scheduled as per instruction of your physician. Refills should be requested at the time of your visit.

## 2024-09-09 NOTE — PROGRESS NOTES
CARDIOLOGY OFFICE VISIT      CHIEF COMPLAINT  Chief Complaint   Patient presents with    Follow-up     3 month        HISTORY OF PRESENT ILLNESS  Brian Lennon is a 89 y.o. year old male patient with history of CAD and remote CABG, hypertension, dyslipidemia and nonischemic cardiomyopathy with initial EF of about 30%, but repeat echocardiogram from January 2024 shows normalized LV function with grade 1 diastolic dysfunction and moderate right ventricular enlargement with mildly reduced RV function.  He has a history of frequent PVCs which appears to have improved and follow-up Holter in August 2024 shows underlying sinus rhythm with mild to moderate PVC burden of 5.3%.  There are no episodes of nonsustained VT.  These represented a significant reduction from his PVC burden back in May 2024.  He is currently maintained on amiodarone which she is tolerating.  Recent labs from August 2024 shows a stable creatinine of 2.5.  BN peptide is down from 1005-3 19    ASSESSMENT AND PLAN  1.  Nonischemic cardiomyopathy: With normalized LV function as noted above and NYHA class II symptoms.  Will continue current medications.  2.  CAD s/p remote CABG with no recurrent chest pain, continue with lifelong aspirin.  3.  History of frequent PVCs and nonsustained VT: This is markedly improved with no episodes of nonsustained VT on his repeat Holter with improved PVC burden as noted above.  We will add magnesium oxide 400 mg daily  4.  Hypertension: Blood pressure is suboptimally controlled, we will switch his Lasix to HCTZ 12.5 mg daily  Problem List Items Addressed This Visit       Arteriosclerotic cardiovascular disease    Benign essential hypertension    Mixed hyperlipidemia    Obstructive sleep apnea syndrome    Chronic combined systolic and diastolic congestive heart failure (Multi)    BMI 25.0-25.9,adult    Former smoker    PVC (premature ventricular contraction)    Fall       Recent Cardiovascular  Testing:    Echo-  Stress-  Cath-  Carotid Ultrasound-    Past Medical History  History reviewed. No pertinent past medical history.    Social History  Social History     Tobacco Use    Smoking status: Former     Types: Cigarettes    Smokeless tobacco: Never   Vaping Use    Vaping status: Never Used   Substance Use Topics    Alcohol use: Not Currently    Drug use: Never       Family History     Family History   Problem Relation Name Age of Onset    Diabetes Mother          Allergies:  No Known Allergies     Outpatient Medications:  Current Outpatient Medications   Medication Instructions    amiodarone (Pacerone) 200 mg tablet TAKE 1 TABLET BY MOUTH TWICE A DAY FOR 14 DAYS, THEN 1 TABLET ONCE DAILY.    aspirin (ECOTRIN LOW STRENGTH) 81 mg, oral, Daily    atorvastatin (LIPITOR) 40 mg, oral, Nightly    carvedilol (COREG) 12.5 mg, oral, 2 times daily    furosemide (LASIX) 40 mg, oral, Daily    losartan (COZAAR) 50 mg, oral, Daily    meclizine (Antivert) 12.5 mg tablet 1 tablet, oral, 3 times daily PRN    mirabegron (MYRBETRIQ) 50 mg, oral, Daily    nitroglycerin (NITROSTAT) 0.4 mg, sublingual, Every 5 min PRN    propylene glycoL 0.6 % drops 2 drops, ophthalmic (eye), 2 times daily    vit A/vit C/vit E/zinc/copper (PRESERVISION AREDS ORAL) 1 tablet, oral, Every 12 hours    white petrolatum-mineral oiL 94-3 % ophthalmic ointment 1 Application, Both Eyes, Nightly        Recent Lab Results:    CBC:   Lab Results   Component Value Date    WBC 7.8 09/04/2023    RBC 3.64 (L) 09/04/2023    HGB 11.1 (L) 09/04/2023    HCT 33.6 (L) 09/04/2023     09/04/2023        CMP:    Lab Results   Component Value Date     07/16/2024    K 4.6 07/16/2024     07/16/2024    CO2 28 07/16/2024    BUN 53 (H) 07/16/2024    CREATININE 2.34 (H) 07/16/2024    GLUCOSE 96 07/16/2024    CALCIUM 8.6 07/16/2024       Magnesium:    Lab Results   Component Value Date    MG 1.59 (L) 02/28/2023       Lipid Profile:    Lab Results   Component  "Value Date    TRIG 62 09/13/2022    HDL 46.0 09/13/2022       TSH:    Lab Results   Component Value Date    TSH 2.33 07/16/2024       BNP:   Lab Results   Component Value Date     (H) 01/16/2024        PT/INR:    Lab Results   Component Value Date    PROTIME 11.6 09/04/2023    INR 1.0 09/04/2023       HgBA1c:    No results found for: \"HGBA1C\"    BMP:  Lab Results   Component Value Date     07/16/2024     (L) 09/04/2023    NA CANCELED 03/02/2023     (L) 03/01/2023    K 4.6 07/16/2024    K 4.5 09/04/2023    K CANCELED 03/02/2023    K 3.9 03/01/2023     07/16/2024     09/04/2023    CL CANCELED 03/02/2023     03/01/2023    CO2 28 07/16/2024    CO2 25 09/04/2023    CO2 CANCELED 03/02/2023    CO2 27 03/01/2023    BUN 53 (H) 07/16/2024    BUN 44 (H) 09/04/2023    BUN CANCELED 03/02/2023    BUN 34 (H) 03/01/2023    CREATININE 2.34 (H) 07/16/2024    CREATININE 1.84 (H) 09/04/2023    CREATININE CANCELED 03/02/2023    CREATININE 1.76 (H) 03/01/2023       CBC:  Lab Results   Component Value Date    WBC 7.8 09/04/2023    WBC CANCELED 03/02/2023    WBC 6.6 03/01/2023    RBC 3.64 (L) 09/04/2023    RBC CANCELED 03/02/2023    RBC 3.90 (L) 03/01/2023    HGB 11.1 (L) 09/04/2023    HGB CANCELED 03/02/2023    HGB 11.3 (L) 03/01/2023    HCT 33.6 (L) 09/04/2023    HCT CANCELED 03/02/2023    HCT 34.3 (L) 03/01/2023    MCV 92 09/04/2023    MCV CANCELED 03/02/2023    MCV 88 03/01/2023    MCHC 33.0 09/04/2023    MCHC CANCELED 03/02/2023    MCHC 32.9 03/01/2023    RDW 13.1 09/04/2023    RDW CANCELED 03/02/2023    RDW 13.3 03/01/2023     09/04/2023    PLT CANCELED 03/02/2023     03/01/2023       Cardiac Enzymes:    Lab Results   Component Value Date    TROPHS 35 (H) 09/04/2023    TROPHS 33 (H) 09/04/2023    TROPHS 160 (H) 02/28/2023       Hepatic Function Panel:    Lab Results   Component Value Date    ALKPHOS 56 07/16/2024    ALT 22 07/16/2024    AST 25 07/16/2024    PROT 7.3 07/16/2024 "    BILITOT 0.4 07/16/2024    BILIDIR 0.0 09/04/2023         REVIEW OF SYSTEMS  Review of Systems   Constitutional: Negative.   Cardiovascular: Negative.    Respiratory: Negative.     Neurological: Negative.    All other systems reviewed and are negative.      VITALS  Vitals:    09/09/24 1012   BP: 160/80   Pulse: 72     Wt Readings from Last 4 Encounters:   09/09/24 78.5 kg (173 lb)   07/18/24 77.6 kg (171 lb)   07/16/24 78 kg (172 lb)   06/10/24 79.2 kg (174 lb 9.6 oz)       PHYSICAL EXAM  Constitutional:       Appearance: Healthy appearance.   Eyes:      Pupils: Pupils are equal, round, and reactive to light.   Pulmonary:      Effort: Pulmonary effort is normal.      Breath sounds: Normal breath sounds.   Cardiovascular:      PMI at left midclavicular line. Normal rate. Regular rhythm.      Murmurs: There is no murmur.      No gallop.  No click. No rub.   Pulses:     Intact distal pulses.   Musculoskeletal: Normal range of motion.      Cervical back: Normal range of motion. Skin:     General: Skin is warm and dry.   Neurological:      General: No focal deficit present.      Mental Status: Alert and oriented to person, place and time.

## 2024-09-17 ENCOUNTER — CLINICAL SUPPORT (OUTPATIENT)
Dept: SLEEP MEDICINE | Facility: HOSPITAL | Age: 89
End: 2024-09-17
Payer: MEDICARE

## 2024-09-17 DIAGNOSIS — G47.31 CENTRAL SLEEP APNEA: ICD-10-CM

## 2024-09-17 DIAGNOSIS — G47.33 OBSTRUCTIVE SLEEP APNEA (ADULT) (PEDIATRIC): ICD-10-CM

## 2024-09-17 PROCEDURE — 95810 POLYSOM 6/> YRS 4/> PARAM: CPT | Performed by: INTERNAL MEDICINE

## 2024-09-18 VITALS — BODY MASS INDEX: 25.18 KG/M2 | WEIGHT: 169.97 LBS | HEIGHT: 69 IN

## 2024-09-18 ASSESSMENT — SLEEP AND FATIGUE QUESTIONNAIRES
ESS-CHAD TOTAL SCORE: 1
HOW LIKELY ARE YOU TO NOD OFF OR FALL ASLEEP WHILE SITTING QUIETLY AFTER LUNCH WITHOUT ALCOHOL: WOULD NEVER DOZE
HOW LIKELY ARE YOU TO NOD OFF OR FALL ASLEEP IN A CAR, WHILE STOPPED FOR A FEW MINUTES IN TRAFFIC: WOULD NEVER DOZE
HOW LIKELY ARE YOU TO NOD OFF OR FALL ASLEEP WHILE WATCHING TV: WOULD NEVER DOZE
HOW LIKELY ARE YOU TO NOD OFF OR FALL ASLEEP WHILE LYING DOWN TO REST IN THE AFTERNOON WHEN CIRCUMSTANCES PERMIT: WOULD NEVER DOZE
HOW LIKELY ARE YOU TO NOD OFF OR FALL ASLEEP WHEN YOU ARE A PASSENGER IN A CAR FOR AN HOUR WITHOUT A BREAK: WOULD NEVER DOZE
HOW LIKELY ARE YOU TO NOD OFF OR FALL ASLEEP WHILE SITTING AND TALKING TO SOMEONE: WOULD NEVER DOZE
SITING INACTIVE IN A PUBLIC PLACE LIKE A CLASS ROOM OR A MOVIE THEATER: WOULD NEVER DOZE
HOW LIKELY ARE YOU TO NOD OFF OR FALL ASLEEP WHILE SITTING AND READING: SLIGHT CHANCE OF DOZING

## 2024-09-18 NOTE — PROGRESS NOTES
[facility] TECH NOTE:     Patient: Brian Lennon   MRN//AGE: [id number]  1935  89 y.o.   Technologist: Sonam Lorenzana   Room: 3   Service Date: 2024        Sleep Testing Location: [facility]     [place]: 1    TECHNOLOGIST SLEEP STUDY PROCEDURE NOTE:   This sleep study is being conducted according to the policies and procedures outlined by the AAS accreditation standards.  The sleep study procedure and processes involved during this appointment was explained to the patient/patient’s family, questions were answered. The patient/family verbalized understanding.      The patient is a 89 y.o. year old male scheduled for a Split Night  with montage of: Standard PSG.     The study that was ultimately completed was a Diagnostic PSG with montage of: Standard PSG.    The full study Was completed.  Patient questionnaires completed?: yes     Consents signed? yes    Initial Fall Risk Screening:     Brian has fallen in the last 6 months. His fall did result in injury. Brian does have a fear of falling. He does not need assistance with sitting, standing, or walking. He does not need assistance walking in his home. He does not need assistance in an unfamiliar setting. The patient isusing an assistive device.     Brief Study observations: Patient is an 89 year old male here for a split night study.  Patient is currently on BIPAP AutoSV per physician's notes.  Study remained PSG due to a total sleep time of less than 120 minutes during the first half of the study (before 2:30am). Respiratory events observed.  Moderate snore observed.  Frequent PVCs observed.  Patient slept in the supine sleep position which he states is his typical sleep position.   CMS scoring guidelines followed based on insurance requirements.        Deviation to order/protocol and reason: None      If PAP, which was preferred mask/pressure/mode: NA      Other:None    After the procedure, the patient/family was informed to ensure followup with  ordering clinician for testing results.      Technologist: DENISE Prescott

## 2024-09-24 ENCOUNTER — LAB (OUTPATIENT)
Dept: LAB | Facility: LAB | Age: 89
End: 2024-09-24
Payer: MEDICARE

## 2024-09-24 DIAGNOSIS — I25.10 ARTERIOSCLEROTIC CARDIOVASCULAR DISEASE: ICD-10-CM

## 2024-09-24 DIAGNOSIS — N18.4 CHRONIC RENAL DISEASE, STAGE IV (MULTI): ICD-10-CM

## 2024-09-24 DIAGNOSIS — I50.42 CHRONIC COMBINED SYSTOLIC AND DIASTOLIC CONGESTIVE HEART FAILURE: ICD-10-CM

## 2024-09-24 LAB
ALBUMIN SERPL BCP-MCNC: 3.6 G/DL (ref 3.4–5)
ALP SERPL-CCNC: 65 U/L (ref 33–136)
ALT SERPL W P-5'-P-CCNC: 17 U/L (ref 10–52)
ANION GAP SERPL CALC-SCNC: 8 MMOL/L (ref 10–20)
AST SERPL W P-5'-P-CCNC: 20 U/L (ref 9–39)
BILIRUB SERPL-MCNC: 0.4 MG/DL (ref 0–1.2)
BUN SERPL-MCNC: 41 MG/DL (ref 6–23)
CALCIUM SERPL-MCNC: 8.4 MG/DL (ref 8.6–10.3)
CHLORIDE SERPL-SCNC: 105 MMOL/L (ref 98–107)
CO2 SERPL-SCNC: 28 MMOL/L (ref 21–32)
CREAT SERPL-MCNC: 2.26 MG/DL (ref 0.5–1.3)
EGFRCR SERPLBLD CKD-EPI 2021: 27 ML/MIN/1.73M*2
GLUCOSE SERPL-MCNC: 90 MG/DL (ref 74–99)
POTASSIUM SERPL-SCNC: 4.8 MMOL/L (ref 3.5–5.3)
PROT SERPL-MCNC: 7.2 G/DL (ref 6.4–8.2)
SODIUM SERPL-SCNC: 136 MMOL/L (ref 136–145)

## 2024-09-24 PROCEDURE — 36415 COLL VENOUS BLD VENIPUNCTURE: CPT

## 2024-09-24 PROCEDURE — 80053 COMPREHEN METABOLIC PANEL: CPT

## 2024-09-25 ENCOUNTER — TELEPHONE (OUTPATIENT)
Dept: CARDIOLOGY | Facility: CLINIC | Age: 89
End: 2024-09-25
Payer: MEDICARE

## 2024-09-25 NOTE — TELEPHONE ENCOUNTER
----- Message from Jose M Loja sent at 9/25/2024  3:42 AM EDT -----  Renal function is improved but still elevated.  Advised to follow-up with primary care physician.

## 2024-10-02 ENCOUNTER — APPOINTMENT (OUTPATIENT)
Dept: CARDIOLOGY | Facility: CLINIC | Age: 89
End: 2024-10-02
Payer: MEDICARE

## 2024-10-02 VITALS
DIASTOLIC BLOOD PRESSURE: 80 MMHG | HEIGHT: 69 IN | BODY MASS INDEX: 26.19 KG/M2 | HEART RATE: 72 BPM | SYSTOLIC BLOOD PRESSURE: 150 MMHG | WEIGHT: 176.8 LBS

## 2024-10-02 DIAGNOSIS — N42.9 PROSTATE DISORDER: ICD-10-CM

## 2024-10-02 DIAGNOSIS — G47.33 OBSTRUCTIVE SLEEP APNEA SYNDROME: ICD-10-CM

## 2024-10-02 DIAGNOSIS — E78.2 MIXED HYPERLIPIDEMIA: ICD-10-CM

## 2024-10-02 DIAGNOSIS — I25.10 ARTERIOSCLEROTIC CARDIOVASCULAR DISEASE: ICD-10-CM

## 2024-10-02 DIAGNOSIS — Z95.1 HISTORY OF CORONARY ARTERY BYPASS GRAFT: ICD-10-CM

## 2024-10-02 DIAGNOSIS — Z87.891 FORMER SMOKER: ICD-10-CM

## 2024-10-02 DIAGNOSIS — I50.42 CHRONIC COMBINED SYSTOLIC AND DIASTOLIC CONGESTIVE HEART FAILURE: ICD-10-CM

## 2024-10-02 DIAGNOSIS — I10 BENIGN ESSENTIAL HYPERTENSION: ICD-10-CM

## 2024-10-02 PROCEDURE — 1157F ADVNC CARE PLAN IN RCRD: CPT | Performed by: INTERNAL MEDICINE

## 2024-10-02 PROCEDURE — 3077F SYST BP >= 140 MM HG: CPT | Performed by: INTERNAL MEDICINE

## 2024-10-02 PROCEDURE — 1036F TOBACCO NON-USER: CPT | Performed by: INTERNAL MEDICINE

## 2024-10-02 PROCEDURE — 99214 OFFICE O/P EST MOD 30 MIN: CPT | Performed by: INTERNAL MEDICINE

## 2024-10-02 PROCEDURE — 1159F MED LIST DOCD IN RCRD: CPT | Performed by: INTERNAL MEDICINE

## 2024-10-02 PROCEDURE — 3078F DIAST BP <80 MM HG: CPT | Performed by: INTERNAL MEDICINE

## 2024-10-02 RX ORDER — LOSARTAN POTASSIUM 50 MG/1
50 TABLET ORAL DAILY
Qty: 90 TABLET | Refills: 3 | Status: SHIPPED | OUTPATIENT
Start: 2024-10-02 | End: 2024-10-04 | Stop reason: SDUPTHER

## 2024-10-02 ASSESSMENT — ENCOUNTER SYMPTOMS
RESPIRATORY NEGATIVE: 1
NEUROLOGICAL NEGATIVE: 1
CARDIOVASCULAR NEGATIVE: 1
CONSTITUTIONAL NEGATIVE: 1

## 2024-10-02 NOTE — PROGRESS NOTES
CARDIOLOGY OFFICE VISIT      CHIEF COMPLAINT  Chief Complaint   Patient presents with    Follow-up     3-4 weeks follow up        HISTORY OF PRESENT ILLNESS  Brian Lennon is a 89 y.o. year old male patient with history of CAD and remote CABG, hypertension, dyslipidemia and nonischemic cardiomyopathy with initial EF of about 30%, but repeat echocardiogram from January 2024 shows normalized LV function with grade 1 diastolic dysfunction and moderate right ventricular enlargement with mildly reduced RV function.  He has a history of frequent PVCs which appears to have improved and follow-up Holter in August 2024 shows underlying sinus rhythm with mild to moderate PVC burden of 5.3%.  There are no episodes of nonsustained VT.  These represented a significant reduction from his PVC burden back in May 2024.  He is currently maintained on amiodarone which she is tolerating.  Recent labs from August 2024 shows a stable creatinine of 2.5.  BN peptide is down from 1005-3 19    ASSESSMENT AND PLAN  1.  Nonischemic cardiomyopathy: With normalized LV function as noted above and NYHA class II symptoms.  Will continue current medications.  2.  CAD s/p remote CABG with no recurrent chest pain, continue with lifelong aspirin.  3.  History of frequent PVCs and nonsustained VT: This is markedly improved with no episodes of nonsustained VT on his repeat Holter with improved PVC burden as noted above.  We will add magnesium oxide 400 mg daily  4.  Hypertension: Blood pressure is suboptimally controlled, patient apparently has not taken his medications this morning.  Will continue with current medications and reevaluate for repeat blood pressure check when he returns for follow-up.    Problem List Items Addressed This Visit          Cardiac and Vasculature    Arteriosclerotic cardiovascular disease    Benign essential hypertension    Relevant Medications    losartan (Cozaar) 50 mg tablet    Mixed hyperlipidemia    Chronic combined  systolic and diastolic congestive heart failure    History of coronary artery bypass graft       Endocrine/Metabolic    BMI 26.0-26.9,adult       Sleep    Obstructive sleep apnea syndrome       Tobacco    Former smoker     Other Visit Diagnoses       Prostate disorder        Relevant Orders    Referral to Urology            Recent Cardiovascular Testing:    Echo-  Stress-  Cath-  Carotid Ultrasound-    Past Medical History  No past medical history on file.    Social History  Social History     Tobacco Use    Smoking status: Former     Current packs/day: 0.00     Types: Cigarettes     Quit date: 2014     Years since quitting: 10.7    Smokeless tobacco: Never   Vaping Use    Vaping status: Never Used   Substance Use Topics    Alcohol use: Not Currently    Drug use: Never       Family History     Family History   Problem Relation Name Age of Onset    Diabetes Mother          Allergies:  No Known Allergies     Outpatient Medications:  Current Outpatient Medications   Medication Instructions    amiodarone (Pacerone) 200 mg tablet TAKE 1 TABLET BY MOUTH TWICE A DAY FOR 14 DAYS, THEN 1 TABLET ONCE DAILY.    aspirin (ECOTRIN LOW STRENGTH) 81 mg, oral, Daily    atorvastatin (LIPITOR) 40 mg, oral, Nightly    carvedilol (COREG) 12.5 mg, oral, 2 times daily    hydroCHLOROthiazide (MICROZIDE) 12.5 mg, oral, Daily    losartan (COZAAR) 50 mg, oral, Daily    magnesium oxide 400 mg, oral, Daily    meclizine (Antivert) 12.5 mg tablet 1 tablet, oral, 3 times daily PRN    mirabegron (MYRBETRIQ) 50 mg, oral, Daily    nitroglycerin (NITROSTAT) 0.4 mg, sublingual, Every 5 min PRN    propylene glycoL 0.6 % drops 2 drops, ophthalmic (eye), 2 times daily    vit A/vit C/vit E/zinc/copper (PRESERVISION AREDS ORAL) 1 tablet, oral, Every 12 hours    white petrolatum-mineral oiL 94-3 % ophthalmic ointment 1 Application, Both Eyes, Nightly        Recent Lab Results:    CBC:   Lab Results   Component Value Date    WBC 7.8 09/04/2023    RBC 3.64 (L)  "09/04/2023    HGB 11.1 (L) 09/04/2023    HCT 33.6 (L) 09/04/2023     09/04/2023        CMP:    Lab Results   Component Value Date     09/24/2024    K 4.8 09/24/2024     09/24/2024    CO2 28 09/24/2024    BUN 41 (H) 09/24/2024    CREATININE 2.26 (H) 09/24/2024    GLUCOSE 90 09/24/2024    CALCIUM 8.4 (L) 09/24/2024       Magnesium:    Lab Results   Component Value Date    MG 1.59 (L) 02/28/2023       Lipid Profile:    Lab Results   Component Value Date    TRIG 62 09/13/2022    HDL 46.0 09/13/2022       TSH:    Lab Results   Component Value Date    TSH 2.33 07/16/2024       BNP:   Lab Results   Component Value Date     (H) 01/16/2024        PT/INR:    Lab Results   Component Value Date    PROTIME 11.6 09/04/2023    INR 1.0 09/04/2023       HgBA1c:    No results found for: \"HGBA1C\"    BMP:  Lab Results   Component Value Date     09/24/2024     07/16/2024     (L) 09/04/2023    NA CANCELED 03/02/2023     (L) 03/01/2023    K 4.8 09/24/2024    K 4.6 07/16/2024    K 4.5 09/04/2023    K CANCELED 03/02/2023    K 3.9 03/01/2023     09/24/2024     07/16/2024     09/04/2023    CL CANCELED 03/02/2023     03/01/2023    CO2 28 09/24/2024    CO2 28 07/16/2024    CO2 25 09/04/2023    CO2 CANCELED 03/02/2023    CO2 27 03/01/2023    BUN 41 (H) 09/24/2024    BUN 53 (H) 07/16/2024    BUN 44 (H) 09/04/2023    BUN CANCELED 03/02/2023    BUN 34 (H) 03/01/2023    CREATININE 2.26 (H) 09/24/2024    CREATININE 2.34 (H) 07/16/2024    CREATININE 1.84 (H) 09/04/2023    CREATININE CANCELED 03/02/2023    CREATININE 1.76 (H) 03/01/2023       CBC:  Lab Results   Component Value Date    WBC 7.8 09/04/2023    WBC CANCELED 03/02/2023    WBC 6.6 03/01/2023    RBC 3.64 (L) 09/04/2023    RBC CANCELED 03/02/2023    RBC 3.90 (L) 03/01/2023    HGB 11.1 (L) 09/04/2023    HGB CANCELED 03/02/2023    HGB 11.3 (L) 03/01/2023    HCT 33.6 (L) 09/04/2023    HCT CANCELED 03/02/2023    HCT 34.3 (L) " 03/01/2023    MCV 92 09/04/2023    MCV CANCELED 03/02/2023    MCV 88 03/01/2023    MCHC 33.0 09/04/2023    MCHC CANCELED 03/02/2023    MCHC 32.9 03/01/2023    RDW 13.1 09/04/2023    RDW CANCELED 03/02/2023    RDW 13.3 03/01/2023     09/04/2023    PLT CANCELED 03/02/2023     03/01/2023       Cardiac Enzymes:    Lab Results   Component Value Date    TROPHS 35 (H) 09/04/2023    TROPHS 33 (H) 09/04/2023    TROPHS 160 (H) 02/28/2023       Hepatic Function Panel:    Lab Results   Component Value Date    ALKPHOS 65 09/24/2024    ALT 17 09/24/2024    AST 20 09/24/2024    PROT 7.2 09/24/2024    BILITOT 0.4 09/24/2024    BILIDIR 0.0 09/04/2023         REVIEW OF SYSTEMS  Review of Systems   Constitutional: Negative.   Cardiovascular: Negative.    Respiratory: Negative.     Neurological: Negative.    All other systems reviewed and are negative.      VITALS  Vitals:    10/02/24 1009   BP: 150/80   Pulse: 72     Wt Readings from Last 4 Encounters:   10/02/24 80.2 kg (176 lb 12.8 oz)   09/18/24 77.1 kg (169 lb 15.6 oz)   09/09/24 78.5 kg (173 lb)   07/18/24 77.6 kg (171 lb)       PHYSICAL EXAM  Constitutional:       Appearance: Healthy appearance.   Eyes:      Pupils: Pupils are equal, round, and reactive to light.   Pulmonary:      Effort: Pulmonary effort is normal.      Breath sounds: Normal breath sounds.   Cardiovascular:      PMI at left midclavicular line. Normal rate. Regular rhythm.      Murmurs: There is no murmur.      No gallop.  No click. No rub.   Pulses:     Intact distal pulses.   Musculoskeletal: Normal range of motion.      Cervical back: Normal range of motion. Skin:     General: Skin is warm and dry.   Neurological:      General: No focal deficit present.      Mental Status: Alert and oriented to person, place and time.

## 2024-10-02 NOTE — PATIENT INSTRUCTIONS
Refer to Urology Dr Collins  2-3 month visit  Continue same medications and treatments.   Patient educated on proper medication use.   Patient educated on risk factor modification.   Please bring any lab results from other providers / physicians to your next appointment.     Please bring all medicines, vitamins, and herbal supplements with you when you come to the office.     Prescriptions will not be filled unless you are compliant with your follow up appointments or have a follow up appointment scheduled as per instruction of your physician. Refills should be requested at the time of your visit.

## 2024-10-03 ENCOUNTER — TELEPHONE (OUTPATIENT)
Dept: CARDIOLOGY | Facility: CLINIC | Age: 89
End: 2024-10-03
Payer: MEDICARE

## 2024-10-04 DIAGNOSIS — I10 BENIGN ESSENTIAL HYPERTENSION: ICD-10-CM

## 2024-10-04 RX ORDER — LOSARTAN POTASSIUM 50 MG/1
50 TABLET ORAL DAILY
Qty: 90 TABLET | Refills: 3 | Status: SHIPPED | OUTPATIENT
Start: 2024-10-04 | End: 2025-09-29

## 2024-10-08 ENCOUNTER — PHARMACY VISIT (OUTPATIENT)
Dept: PHARMACY | Facility: CLINIC | Age: 89
End: 2024-10-08
Payer: COMMERCIAL

## 2024-10-08 PROCEDURE — RXMED WILLOW AMBULATORY MEDICATION CHARGE

## 2024-10-11 DIAGNOSIS — R39.9 PROSTATE DISORDER WITH LOWER URINARY TRACT SYMPTOMS: ICD-10-CM

## 2024-10-11 DIAGNOSIS — N42.9 PROSTATE DISORDER WITH LOWER URINARY TRACT SYMPTOMS: ICD-10-CM

## 2024-10-17 ENCOUNTER — APPOINTMENT (OUTPATIENT)
Dept: PRIMARY CARE | Facility: CLINIC | Age: 89
End: 2024-10-17
Payer: MEDICARE

## 2024-10-17 VITALS
WEIGHT: 179 LBS | HEART RATE: 69 BPM | DIASTOLIC BLOOD PRESSURE: 80 MMHG | TEMPERATURE: 96.6 F | BODY MASS INDEX: 26.51 KG/M2 | SYSTOLIC BLOOD PRESSURE: 135 MMHG | HEIGHT: 69 IN

## 2024-10-17 DIAGNOSIS — I50.42 CHRONIC COMBINED SYSTOLIC AND DIASTOLIC CONGESTIVE HEART FAILURE: ICD-10-CM

## 2024-10-17 DIAGNOSIS — N18.4 CHRONIC RENAL DISEASE, STAGE IV (MULTI): ICD-10-CM

## 2024-10-17 DIAGNOSIS — C88.40 MALT (MUCOSA ASSOCIATED LYMPHOID TISSUE): ICD-10-CM

## 2024-10-17 DIAGNOSIS — I10 BENIGN ESSENTIAL HYPERTENSION: ICD-10-CM

## 2024-10-17 DIAGNOSIS — G47.33 OBSTRUCTIVE SLEEP APNEA SYNDROME: ICD-10-CM

## 2024-10-17 DIAGNOSIS — C85.90 LYMPHOMA, UNSPECIFIED BODY REGION, UNSPECIFIED LYMPHOMA TYPE (MULTI): Primary | ICD-10-CM

## 2024-10-17 DIAGNOSIS — D47.2 MGUS (MONOCLONAL GAMMOPATHY OF UNKNOWN SIGNIFICANCE): ICD-10-CM

## 2024-10-17 DIAGNOSIS — I25.10 ARTERIOSCLEROTIC CARDIOVASCULAR DISEASE: ICD-10-CM

## 2024-10-17 DIAGNOSIS — Z23 FLU VACCINE NEED: ICD-10-CM

## 2024-10-17 PROCEDURE — 3079F DIAST BP 80-89 MM HG: CPT | Performed by: INTERNAL MEDICINE

## 2024-10-17 PROCEDURE — 1159F MED LIST DOCD IN RCRD: CPT | Performed by: INTERNAL MEDICINE

## 2024-10-17 PROCEDURE — 3075F SYST BP GE 130 - 139MM HG: CPT | Performed by: INTERNAL MEDICINE

## 2024-10-17 PROCEDURE — 1157F ADVNC CARE PLAN IN RCRD: CPT | Performed by: INTERNAL MEDICINE

## 2024-10-17 PROCEDURE — 1036F TOBACCO NON-USER: CPT | Performed by: INTERNAL MEDICINE

## 2024-10-17 PROCEDURE — 90662 IIV NO PRSV INCREASED AG IM: CPT | Performed by: INTERNAL MEDICINE

## 2024-10-17 PROCEDURE — 1160F RVW MEDS BY RX/DR IN RCRD: CPT | Performed by: INTERNAL MEDICINE

## 2024-10-17 PROCEDURE — 99214 OFFICE O/P EST MOD 30 MIN: CPT | Performed by: INTERNAL MEDICINE

## 2024-10-17 PROCEDURE — G0008 ADMIN INFLUENZA VIRUS VAC: HCPCS | Performed by: INTERNAL MEDICINE

## 2024-10-17 PROCEDURE — G2211 COMPLEX E/M VISIT ADD ON: HCPCS | Performed by: INTERNAL MEDICINE

## 2024-10-17 RX ORDER — MIRABEGRON 50 MG/1
50 TABLET, FILM COATED, EXTENDED RELEASE ORAL DAILY
Qty: 30 TABLET | Refills: 6 | Status: SHIPPED | OUTPATIENT
Start: 2024-10-17

## 2024-10-17 RX ORDER — VIT C/E/ZN/COPPR/LUTEIN/ZEAXAN 250MG-90MG
25 CAPSULE ORAL DAILY
Qty: 30 CAPSULE | Refills: 11 | Status: SHIPPED | OUTPATIENT
Start: 2024-10-17 | End: 2025-10-17

## 2024-10-17 ASSESSMENT — ENCOUNTER SYMPTOMS
ABDOMINAL PAIN: 0
BACK PAIN: 1
VOMITING: 0
NUMBNESS: 0
FEVER: 0
NEUROLOGICAL NEGATIVE: 1
CONSTITUTIONAL NEGATIVE: 1
CARDIOVASCULAR NEGATIVE: 1
GASTROINTESTINAL NEGATIVE: 1
SHORTNESS OF BREATH: 0
ARTHRALGIAS: 1
FATIGUE: 0
DEPRESSION: 0
CONFUSION: 0
OCCASIONAL FEELINGS OF UNSTEADINESS: 0
COUGH: 0
WOUND: 0
LOSS OF SENSATION IN FEET: 0

## 2024-10-17 ASSESSMENT — COLUMBIA-SUICIDE SEVERITY RATING SCALE - C-SSRS
1. IN THE PAST MONTH, HAVE YOU WISHED YOU WERE DEAD OR WISHED YOU COULD GO TO SLEEP AND NOT WAKE UP?: NO
2. HAVE YOU ACTUALLY HAD ANY THOUGHTS OF KILLING YOURSELF?: NO
6. HAVE YOU EVER DONE ANYTHING, STARTED TO DO ANYTHING, OR PREPARED TO DO ANYTHING TO END YOUR LIFE?: NO

## 2024-10-17 ASSESSMENT — PATIENT HEALTH QUESTIONNAIRE - PHQ9
2. FEELING DOWN, DEPRESSED OR HOPELESS: NOT AT ALL
SUM OF ALL RESPONSES TO PHQ9 QUESTIONS 1 AND 2: 0
1. LITTLE INTEREST OR PLEASURE IN DOING THINGS: NOT AT ALL

## 2024-10-17 NOTE — PATIENT INSTRUCTIONS

## 2024-10-17 NOTE — PROGRESS NOTES
Subjective   Patient ID: Brian Lennon is a 89 y.o. male who presents for Follow-up (3 mo fu).  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 The current episode started more than 1 year ago. The problem occurs intermittently. The problem has been waxing and waning. Associated symptoms include arthralgias. Pertinent negatives include no abdominal pain, coughing, fatigue, fever, numbness, rash or vomiting. The treatment provided no relief.     Hypertension  This is a chronic problem. The current episode started more than 1 year ago. The problem has been resolved since onset. The problem is controlled. Pertinent negatives include no anxiety or headaches. Past treatments include beta blockers. The current treatment provides significant improvement. Hypertensive end-organ damage includes kidney disease. Identifiable causes of hypertension include chronic renal disease.   Heart Problem  This is a chronic problem. The current episode started more than 1 year ago. The problem occurs rarely. The problem has been resolved. Associated symptoms include arthralgias. Pertinent negatives include no abdominal pain, anorexia, congestion or headaches. The treatment provided significant relief  Past Medical History  History reviewed. No pertinent past medical history.    Social History  Social History     Tobacco Use    Smoking status: Former     Current packs/day: 0.00     Types: Cigarettes     Quit date: 2014     Years since quitting: 10.8    Smokeless tobacco: Never   Vaping Use    Vaping status: Never Used   Substance Use Topics    Alcohol use: Not Currently    Drug use: Never       Family History     Family History   Problem Relation Name Age of Onset    Diabetes Mother         Allergies:  No Known Allergies     Outpatient Medications:  Current Outpatient Medications   Medication Instructions    amiodarone (Pacerone) 200 mg tablet TAKE 1 TABLET BY MOUTH TWICE A DAY FOR 14 DAYS, THEN  1 TABLET ONCE DAILY.    aspirin (ECOTRIN LOW STRENGTH) 81 mg, oral, Daily    atorvastatin (LIPITOR) 40 mg, oral, Nightly    carvedilol (COREG) 12.5 mg, oral, 2 times daily    hydroCHLOROthiazide (MICROZIDE) 12.5 mg, oral, Daily    losartan (COZAAR) 50 mg, oral, Daily    magnesium oxide 400 mg, oral, Daily    meclizine (Antivert) 12.5 mg tablet 1 tablet, oral, 3 times daily PRN    mirabegron (MYRBETRIQ) 50 mg, oral, Daily    nitroglycerin (NITROSTAT) 0.4 mg, sublingual, Every 5 min PRN    propylene glycoL 0.6 % drops 2 drops, ophthalmic (eye), 2 times daily    vit A/vit C/vit E/zinc/copper (PRESERVISION AREDS ORAL) 1 tablet, oral, Every 12 hours    white petrolatum-mineral oiL 94-3 % ophthalmic ointment 1 Application, Both Eyes, Nightly        Review of Systems   Constitutional: Negative.  Negative for fatigue and fever.   Respiratory:  Negative for cough and shortness of breath.    Cardiovascular: Negative.    Gastrointestinal: Negative.  Negative for abdominal pain and vomiting.   Musculoskeletal:  Positive for arthralgias and back pain.   Skin:  Negative for rash and wound.   Neurological: Negative.  Negative for numbness.   Psychiatric/Behavioral:  Negative for confusion.          Objective       Physical Exam  Vitals reviewed.   Constitutional:       Appearance: Normal appearance. He is normal weight.   HENT:      Head: Normocephalic.   Eyes:      Conjunctiva/sclera: Conjunctivae normal.   Cardiovascular:      Rate and Rhythm: Normal rate and regular rhythm.      Pulses: Normal pulses.   Pulmonary:      Effort: Pulmonary effort is normal.      Breath sounds: Normal breath sounds.   Abdominal:      Palpations: Abdomen is soft.   Musculoskeletal:         General: Deformity present. No tenderness.      Cervical back: Neck supple.      Comments: stooped   Skin:     General: Skin is warm and dry.   Neurological:      General: No focal deficit present.   Psychiatric:         Behavior: Behavior normal.     /80  "(BP Location: Left arm, Patient Position: Sitting, BP Cuff Size: Adult)   Pulse 69   Temp 35.9 °C (96.6 °F) (Temporal)   Ht 1.753 m (5' 9\")   Wt 81.2 kg (179 lb)   BMI 26.43 kg/m²      Assessment/Plan   Problem List Items Addressed This Visit       Arteriosclerotic cardiovascular disease    Benign essential hypertension    Relevant Medications    mirabegron (Myrbetriq) 50 mg tablet extended release 24 hr 24 hr tablet    MALT (mucosa associated lymphoid tissue)    MGUS (monoclonal gammopathy of unknown significance)    Obstructive sleep apnea syndrome    Chronic combined systolic and diastolic congestive heart failure    Chronic renal disease, stage IV (Multi)    Lymphoma, unspecified body region, unspecified lymphoma type (Multi) - Primary     Other Visit Diagnoses       Flu vaccine need        Relevant Orders    Flu vaccine, trivalent, preservative free, HIGH-DOSE, age 65y+ (Fluzone)        Patient has a multiple problems, first of all 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  Ophthalmic evaluation from [facility] reviewed.  Patient has a monoclonal gammopathy of unknown significance with persistent kappa and lambda chains seen, that also has been on surveillance because latest oncology note did not reveal any significant treatment plan.  Patient has a severe sleep apnea, recent sleep report was reviewed, he already has a CPAP, CPAP needs to be renewed.  Compliance has been there usually.  Patient says shortness of breath is better, his ejection fraction remains low, he remains on losartan and beta-blocker.  Patient has a hypertension BP readings are stable, history of PCI and cardiovascular disease no angina.  Kidney functions remains impaired, creatinine is 2.26 GFR 29-30, he is confused about all this appointment so we went through his chronic problems and also we told him which " appointment is important in which she is not, it is not unusual that [facility] will send this patient to multiple providers and that has to be limited.  He has a minor fall, he is using walker for mobility and ambulation, his medications are reviewed, Myrbetriq was refilled, flu vaccine was given, rest of therapy will not be disturbed, he is not fluid overloaded, salt restriction should be kept and maintain.  Follow-up in 3 months.

## 2024-11-18 DIAGNOSIS — I49.3 PVC (PREMATURE VENTRICULAR CONTRACTION): ICD-10-CM

## 2024-11-18 DIAGNOSIS — I49.3 FREQUENT UNIFOCAL PVCS: ICD-10-CM

## 2024-11-18 DIAGNOSIS — I10 BENIGN ESSENTIAL HYPERTENSION: ICD-10-CM

## 2024-11-18 DIAGNOSIS — C88.40 MALT (MUCOSA ASSOCIATED LYMPHOID TISSUE): ICD-10-CM

## 2024-11-18 RX ORDER — CALCIUM CARBONATE/VITAMIN D3 500-10/5ML
400 LIQUID (ML) ORAL DAILY
Qty: 90 CAPSULE | Refills: 3 | Status: SHIPPED | OUTPATIENT
Start: 2024-11-18 | End: 2024-11-20 | Stop reason: SDUPTHER

## 2024-11-18 RX ORDER — HYDROCHLOROTHIAZIDE 12.5 MG/1
12.5 TABLET ORAL DAILY
Qty: 90 TABLET | Refills: 3 | Status: SHIPPED | OUTPATIENT
Start: 2024-11-18 | End: 2024-11-20 | Stop reason: SDUPTHER

## 2024-11-18 RX ORDER — AMIODARONE HYDROCHLORIDE 200 MG/1
200 TABLET ORAL DAILY
Qty: 90 TABLET | Refills: 3 | Status: SHIPPED | OUTPATIENT
Start: 2024-11-18 | End: 2024-11-20 | Stop reason: SDUPTHER

## 2024-11-18 RX ORDER — VIT C/E/ZN/COPPR/LUTEIN/ZEAXAN 250MG-90MG
25 CAPSULE ORAL DAILY
Qty: 30 CAPSULE | Refills: 11 | Status: SHIPPED | OUTPATIENT
Start: 2024-11-18 | End: 2025-11-18

## 2024-11-18 NOTE — TELEPHONE ENCOUNTER
Patient came in asking for a refill for his magnesium, amiodarone, and hydro. Send to [facility] on [facility].

## 2024-11-18 NOTE — TELEPHONE ENCOUNTER
Received request for prescription refill for patient.  Patient follows with Dr. Jose M Loja MD     Request is for magnesium, amiodarone, hydrochlorothiazide   Is patient currently on medication- yes    Last OV- 10/2/24  Next OV- 1/8/25    Pended for signing and sent to provider.

## 2024-11-21 RX ORDER — CALCIUM CARBONATE/VITAMIN D3 500-10/5ML
400 LIQUID (ML) ORAL DAILY
Qty: 90 CAPSULE | Refills: 3 | Status: SHIPPED | OUTPATIENT
Start: 2024-11-21 | End: 2025-11-21

## 2024-11-21 RX ORDER — AMIODARONE HYDROCHLORIDE 200 MG/1
200 TABLET ORAL DAILY
Qty: 90 TABLET | Refills: 3 | Status: SHIPPED | OUTPATIENT
Start: 2024-11-21 | End: 2025-11-21

## 2024-11-21 RX ORDER — HYDROCHLOROTHIAZIDE 12.5 MG/1
12.5 TABLET ORAL DAILY
Qty: 90 TABLET | Refills: 3 | Status: SHIPPED | OUTPATIENT
Start: 2024-11-21 | End: 2025-11-21

## 2024-12-07 ENCOUNTER — APPOINTMENT (OUTPATIENT)
Dept: UROLOGY | Facility: CLINIC | Age: 89
End: 2024-12-07
Payer: MEDICARE

## 2024-12-13 ENCOUNTER — TELEPHONE (OUTPATIENT)
Dept: PRIMARY CARE | Facility: CLINIC | Age: 89
End: 2024-12-13
Payer: MEDICARE

## 2024-12-16 ENCOUNTER — PHARMACY VISIT (OUTPATIENT)
Dept: PHARMACY | Facility: CLINIC | Age: 89
End: 2024-12-16
Payer: COMMERCIAL

## 2024-12-16 DIAGNOSIS — C88.40 MALT (MUCOSA ASSOCIATED LYMPHOID TISSUE): ICD-10-CM

## 2024-12-16 PROCEDURE — RXMED WILLOW AMBULATORY MEDICATION CHARGE

## 2024-12-16 RX ORDER — VIT C/E/ZN/COPPR/LUTEIN/ZEAXAN 250MG-90MG
25 CAPSULE ORAL DAILY
Qty: 30 CAPSULE | Refills: 11 | Status: SHIPPED | OUTPATIENT
Start: 2024-12-16 | End: 2025-12-16

## 2025-01-08 ENCOUNTER — APPOINTMENT (OUTPATIENT)
Dept: CARDIOLOGY | Facility: CLINIC | Age: 89
End: 2025-01-08
Payer: MEDICARE

## 2025-01-20 ENCOUNTER — APPOINTMENT (OUTPATIENT)
Dept: PRIMARY CARE | Facility: CLINIC | Age: 89
End: 2025-01-20
Payer: MEDICARE

## 2025-01-20 VITALS
SYSTOLIC BLOOD PRESSURE: 140 MMHG | DIASTOLIC BLOOD PRESSURE: 83 MMHG | HEIGHT: 69 IN | TEMPERATURE: 96.9 F | HEART RATE: 63 BPM | WEIGHT: 178 LBS | BODY MASS INDEX: 26.36 KG/M2

## 2025-01-20 DIAGNOSIS — E78.2 MIXED HYPERLIPIDEMIA: ICD-10-CM

## 2025-01-20 DIAGNOSIS — I50.42 CHRONIC COMBINED SYSTOLIC AND DIASTOLIC CONGESTIVE HEART FAILURE: ICD-10-CM

## 2025-01-20 DIAGNOSIS — N18.4 CHRONIC KIDNEY DISEASE (CKD), STAGE IV (SEVERE) (MULTI): ICD-10-CM

## 2025-01-20 DIAGNOSIS — C85.90 LYMPHOMA, UNSPECIFIED BODY REGION, UNSPECIFIED LYMPHOMA TYPE (MULTI): ICD-10-CM

## 2025-01-20 DIAGNOSIS — Z00.00 ROUTINE GENERAL MEDICAL EXAMINATION AT HEALTH CARE FACILITY: Primary | ICD-10-CM

## 2025-01-20 PROCEDURE — 1159F MED LIST DOCD IN RCRD: CPT | Performed by: INTERNAL MEDICINE

## 2025-01-20 PROCEDURE — 1170F FXNL STATUS ASSESSED: CPT | Performed by: INTERNAL MEDICINE

## 2025-01-20 PROCEDURE — G0439 PPPS, SUBSEQ VISIT: HCPCS | Performed by: INTERNAL MEDICINE

## 2025-01-20 PROCEDURE — 1160F RVW MEDS BY RX/DR IN RCRD: CPT | Performed by: INTERNAL MEDICINE

## 2025-01-20 PROCEDURE — 3077F SYST BP >= 140 MM HG: CPT | Performed by: INTERNAL MEDICINE

## 2025-01-20 PROCEDURE — 3079F DIAST BP 80-89 MM HG: CPT | Performed by: INTERNAL MEDICINE

## 2025-01-20 PROCEDURE — 1036F TOBACCO NON-USER: CPT | Performed by: INTERNAL MEDICINE

## 2025-01-20 PROCEDURE — 99397 PER PM REEVAL EST PAT 65+ YR: CPT | Performed by: INTERNAL MEDICINE

## 2025-01-20 PROCEDURE — 1157F ADVNC CARE PLAN IN RCRD: CPT | Performed by: INTERNAL MEDICINE

## 2025-01-20 PROCEDURE — 1123F ACP DISCUSS/DSCN MKR DOCD: CPT | Performed by: INTERNAL MEDICINE

## 2025-01-20 RX ORDER — ATORVASTATIN CALCIUM 40 MG/1
40 TABLET, FILM COATED ORAL NIGHTLY
Qty: 90 TABLET | Refills: 3 | Status: SHIPPED | OUTPATIENT
Start: 2025-01-20

## 2025-01-20 ASSESSMENT — ACTIVITIES OF DAILY LIVING (ADL)
FEEDING YOURSELF: INDEPENDENT
TOILETING: INDEPENDENT
USING TELEPHONE: INDEPENDENT
PATIENT'S MEMORY ADEQUATE TO SAFELY COMPLETE DAILY ACTIVITIES?: YES
WALKS IN HOME: NEEDS ASSISTANCE
HEARING - RIGHT EAR: FUNCTIONAL
DRESSING YOURSELF: INDEPENDENT
STIL DRIVING: YES
TAKING MEDICATION: NEEDS ASSISTANCE
DOING HOUSEWORK: NEEDS ASSISTANCE
MANAGING FINANCES: NEEDS ASSISTANCE
NEEDS ASSISTANCE WITH FOOD: SET UP OF PLATE
HEARING - LEFT EAR: FUNCTIONAL
PILL BOX USED: NO
ADEQUATE_TO_COMPLETE_ADL: YES
GROCERY SHOPPING: NEEDS ASSISTANCE
JUDGMENT_ADEQUATE_SAFELY_COMPLETE_DAILY_ACTIVITIES: YES
BATHING: INDEPENDENT
PREPARING MEALS: NEEDS ASSISTANCE
EATING: INDEPENDENT
GROOMING: INDEPENDENT
USING TRANSPORTATION: NEEDS ASSISTANCE

## 2025-01-20 ASSESSMENT — ENCOUNTER SYMPTOMS
OCCASIONAL FEELINGS OF UNSTEADINESS: 1
FATIGUE: 1
ABDOMINAL PAIN: 0
WEAKNESS: 1
TREMORS: 0
BACK PAIN: 1
NAUSEA: 0
DEPRESSION: 0
CARDIOVASCULAR NEGATIVE: 1
PSYCHIATRIC NEGATIVE: 1
COUGH: 0
DIZZINESS: 0
SHORTNESS OF BREATH: 1
BRUISES/BLEEDS EASILY: 0
ARTHRALGIAS: 1
VOMITING: 0
CHOKING: 0
LOSS OF SENSATION IN FEET: 0

## 2025-01-20 ASSESSMENT — COLUMBIA-SUICIDE SEVERITY RATING SCALE - C-SSRS
1. IN THE PAST MONTH, HAVE YOU WISHED YOU WERE DEAD OR WISHED YOU COULD GO TO SLEEP AND NOT WAKE UP?: NO
6. HAVE YOU EVER DONE ANYTHING, STARTED TO DO ANYTHING, OR PREPARED TO DO ANYTHING TO END YOUR LIFE?: NO
2. HAVE YOU ACTUALLY HAD ANY THOUGHTS OF KILLING YOURSELF?: NO

## 2025-01-20 ASSESSMENT — PATIENT HEALTH QUESTIONNAIRE - PHQ9
SUM OF ALL RESPONSES TO PHQ9 QUESTIONS 1 AND 2: 0
1. LITTLE INTEREST OR PLEASURE IN DOING THINGS: NOT AT ALL
2. FEELING DOWN, DEPRESSED OR HOPELESS: NOT AT ALL
2. FEELING DOWN, DEPRESSED OR HOPELESS: NOT AT ALL
1. LITTLE INTEREST OR PLEASURE IN DOING THINGS: NOT AT ALL
SUM OF ALL RESPONSES TO PHQ9 QUESTIONS 1 AND 2: 0

## 2025-01-20 NOTE — PROGRESS NOTES
Subjective   Reason for Visit: Brian Lennon is an 89 y.o. male here for a Medicare Wellness visit.     Past Medical, Surgical, and Family History reviewed and updated in chart.    Reviewed all medications by prescribing practitioner or clinical pharmacist (such as prescriptions, OTCs, herbal therapies and supplements) and documented in the medical record.    89-year-old male patient was seen for wellness exam.  He has a stage IV CKD, creatinine does not fluctuate, he has a several laboratories done at hematology at [facility], he has a monoclonal gammopathy of unknown significance with persistent light chain proteins and light chain spike seen on testing, he was found to have a marginal cell lymphoma and all this problems has been kept under careful observation.  He has impaired systolic functions, actually last year he did not have any major problem, he has been doing reasonably well although aging is there, he has history of CAD CABG, he has history of massive GI bleeding in the past.  He drives, he stays with the spouse, he is up-to-date with most of the vaccinations he is looking to have a COVID-19 vaccine.  He is compliant with his medications, he did not fall, there could be some problem with the cognition I believe.  There was safe and independent dwelling.        Patient Care Team:  Jason Rodriguez MD as PCP - General  Jose M Loja MD as Cardiologist (Cardiology)     Review of Systems   Constitutional:  Positive for fatigue.   Respiratory:  Positive for shortness of breath. Negative for cough and choking.    Cardiovascular: Negative.    Gastrointestinal:  Negative for abdominal pain, nausea and vomiting.   Musculoskeletal:  Positive for arthralgias and back pain.   Neurological:  Positive for weakness. Negative for dizziness and tremors.   Hematological:  Does not bruise/bleed easily.   Psychiatric/Behavioral: Negative.         Objective   Vitals:  /83 (BP Location: Left arm, Patient Position: Sitting,  "BP Cuff Size: Adult)   Pulse 63   Temp 36.1 °C (96.9 °F) (Temporal)   Ht 1.753 m (5' 9\")   Wt 80.7 kg (178 lb)   BMI 26.29 kg/m²       Physical Exam  Constitutional:       Appearance: Normal appearance. He is normal weight.   HENT:      Head: Normocephalic.   Eyes:      Conjunctiva/sclera: Conjunctivae normal.   Cardiovascular:      Rate and Rhythm: Normal rate and regular rhythm.      Heart sounds: Normal heart sounds.   Pulmonary:      Effort: Pulmonary effort is normal.      Breath sounds: Normal breath sounds.   Abdominal:      Palpations: Abdomen is soft.   Musculoskeletal:         General: Deformity present.      Cervical back: Neck supple.      Comments: Stooped forward   Skin:     General: Skin is warm and dry.   Neurological:      Mental Status: He is oriented to person, place, and time. Mental status is at baseline.   Psychiatric:         Mood and Affect: Mood normal.         Assessment & Plan  Mixed hyperlipidemia    Orders:    atorvastatin (Lipitor) 40 mg tablet; Take 1 tablet (40 mg) by mouth once daily at bedtime.    Routine general medical examination at health care facility    Orders:    1 Year Follow Up In Primary Care - Wellness Exam; Future    Lymphoma, unspecified body region, unspecified lymphoma type (Multi)         Chronic kidney disease (CKD), stage IV (severe) (Multi)         Chronic combined systolic and diastolic congestive heart failure       Wellness exam was done, he is on chronic statins because of hyperlipidemia and being a CAD CABG patient.  Ankles and marginal cell lymphoma has been on a careful observation.  CKD remains, no fluid overload, no fall, he is getting slower, he does not have any increasing pain and discomfort in lower extremities, he has to use support device for mobility, he has been having some visual impairment off and on.  He does not use the CPAP regularly he was not too sure.  He is on amiodarone, not sure exactly was atrial ventricular tachycardia or " ventricular arrhythmias.  Losartan, HCTZ, carvedilol to be continued.  He has asbestosis, he has a macular degeneration.  Overall patient is stable although age-related physical decline and slight cognitive decline is expected.  They have a living will, it is important to stay active at this juncture of the life, it is important to be more cognitively active.  There is no ER visit recently.  Usually patient and patient's spouse try to take care of themselves.  Periodic assessment and follow-up will be done, complete physical exam was done, he has a stooped forward posture and deformed joints.  No paler, no other findings.  Follow-up in 3 months.

## 2025-01-20 NOTE — ASSESSMENT & PLAN NOTE
Wellness exam was done, he is on chronic statins because of hyperlipidemia and being a CAD CABG patient.  Ankles and marginal cell lymphoma has been on a careful observation.  CKD remains, no fluid overload, no fall, he is getting slower, he does not have any increasing pain and discomfort in lower extremities, he has to use support device for mobility, he has been having some visual impairment off and on.  He does not use the CPAP regularly he was not too sure.  He is on amiodarone, not sure exactly was atrial ventricular tachycardia or ventricular arrhythmias.  Losartan, HCTZ, carvedilol to be continued.  He has asbestosis, he has a macular degeneration.  Overall patient is stable although age-related physical decline and slight cognitive decline is expected.  They have a living will, it is important to stay active at this juncture of the life, it is important to be more cognitively active.  There is no ER visit recently.  Usually patient and patient's spouse try to take care of themselves.  Periodic assessment and follow-up will be done, complete physical exam was done, he has a stooped forward posture and deformed joints.  No paler, no other findings.  Follow-up in 3 months.

## 2025-01-21 ENCOUNTER — APPOINTMENT (OUTPATIENT)
Dept: CARDIOLOGY | Facility: CLINIC | Age: 89
End: 2025-01-21
Payer: MEDICARE

## 2025-01-21 VITALS
SYSTOLIC BLOOD PRESSURE: 132 MMHG | HEART RATE: 68 BPM | HEIGHT: 69 IN | BODY MASS INDEX: 25.77 KG/M2 | DIASTOLIC BLOOD PRESSURE: 64 MMHG | WEIGHT: 174 LBS

## 2025-01-21 DIAGNOSIS — G47.33 OBSTRUCTIVE SLEEP APNEA SYNDROME: ICD-10-CM

## 2025-01-21 DIAGNOSIS — I25.10 ARTERIOSCLEROTIC CARDIOVASCULAR DISEASE: ICD-10-CM

## 2025-01-21 DIAGNOSIS — E78.2 MIXED HYPERLIPIDEMIA: ICD-10-CM

## 2025-01-21 DIAGNOSIS — I49.3 PVC (PREMATURE VENTRICULAR CONTRACTION): ICD-10-CM

## 2025-01-21 DIAGNOSIS — I10 BENIGN ESSENTIAL HYPERTENSION: ICD-10-CM

## 2025-01-21 DIAGNOSIS — Z87.891 FORMER SMOKER: ICD-10-CM

## 2025-01-21 PROCEDURE — 99214 OFFICE O/P EST MOD 30 MIN: CPT | Performed by: INTERNAL MEDICINE

## 2025-01-21 PROCEDURE — 3075F SYST BP GE 130 - 139MM HG: CPT | Performed by: INTERNAL MEDICINE

## 2025-01-21 PROCEDURE — 1123F ACP DISCUSS/DSCN MKR DOCD: CPT | Performed by: INTERNAL MEDICINE

## 2025-01-21 PROCEDURE — 1036F TOBACCO NON-USER: CPT | Performed by: INTERNAL MEDICINE

## 2025-01-21 PROCEDURE — 1157F ADVNC CARE PLAN IN RCRD: CPT | Performed by: INTERNAL MEDICINE

## 2025-01-21 PROCEDURE — 3078F DIAST BP <80 MM HG: CPT | Performed by: INTERNAL MEDICINE

## 2025-01-21 PROCEDURE — 1159F MED LIST DOCD IN RCRD: CPT | Performed by: INTERNAL MEDICINE

## 2025-01-21 ASSESSMENT — ENCOUNTER SYMPTOMS
CARDIOVASCULAR NEGATIVE: 1
NEUROLOGICAL NEGATIVE: 1
RESPIRATORY NEGATIVE: 1
CONSTITUTIONAL NEGATIVE: 1

## 2025-01-21 NOTE — PROGRESS NOTES
CARDIOLOGY OFFICE VISIT      CHIEF COMPLAINT  Chief Complaint   Patient presents with    Follow-up     2-3 month         HISTORY OF PRESENT ILLNESS  Brian Lennon is a 89 y.o. year old male patient with history of CAD and remote CABG, hypertension, dyslipidemia and nonischemic cardiomyopathy with initial EF of about 30%, but repeat echocardiogram from January 2024 shows normalized LV function with grade 1 diastolic dysfunction and moderate right ventricular enlargement with mildly reduced RV function.  He has a history of frequent PVCs which appears to have improved and follow-up Holter in August 2024 shows underlying sinus rhythm with mild to moderate PVC burden of 5.3%. This represented a significant reduction from his PVC burden back in May 2024.  He is currently maintained on amiodarone which he is tolerating.    He said he has been doing well with no new chest pain or shortness of breath.  He denies orthopnea proximal nocturnal dyspnea.    ASSESSMENT AND PLAN  1.  Nonischemic cardiomyopathy: With normalized LV function as noted above and NYHA class II symptoms.  Will continue current medications.  2.  CAD s/p remote CABG with no recurrent chest pain, continue with lifelong aspirin.  3.  History of frequent PVCs and nonsustained VT: This is markedly improved with no episodes of nonsustained VT on his repeat Holter with improved PVC burden as noted above.  We will continue with current medications and is advised to follow-up with EP for continued monitoring of his amiodarone therapy.  4.  Hypertension: Blood pressure is well-controlled on current medications which we will continue.    Problem List Items Addressed This Visit          Cardiac and Vasculature    Arteriosclerotic cardiovascular disease    Benign essential hypertension    Mixed hyperlipidemia    PVC (premature ventricular contraction)       Endocrine/Metabolic    BMI 25.0-25.9,adult       Sleep    Obstructive sleep apnea syndrome       Tobacco     Former smoker       Recent Cardiovascular Testing:    Echo-  Stress-  Cath-  Carotid Ultrasound-    Past Medical History  History reviewed. No pertinent past medical history.    Social History  Social History     Tobacco Use    Smoking status: Former     Current packs/day: 0.00     Types: Cigarettes     Quit date:      Years since quittin.0    Smokeless tobacco: Never   Vaping Use    Vaping status: Never Used   Substance Use Topics    Alcohol use: Not Currently    Drug use: Never       Family History     Family History   Problem Relation Name Age of Onset    Diabetes Mother          Allergies:  No Known Allergies     Outpatient Medications:  Current Outpatient Medications   Medication Instructions    amiodarone (PACERONE) 200 mg, oral, Daily    aspirin (ECOTRIN LOW STRENGTH) 81 mg, Daily    atorvastatin (LIPITOR) 40 mg, oral, Nightly    carvedilol (COREG) 12.5 mg, oral, 2 times daily    cholecalciferol (VITAMIN D3) 25 mcg, oral, Daily    hydroCHLOROthiazide (MICROZIDE) 12.5 mg, oral, Daily    losartan (COZAAR) 50 mg, oral, Daily    magnesium oxide 400 mg, oral, Daily    meclizine (Antivert) 12.5 mg tablet 1 tablet, 3 times daily PRN    mirabegron (MYRBETRIQ) 50 mg, oral, Daily    nitroglycerin (NITROSTAT) 0.4 mg, Every 5 min PRN    propylene glycoL 0.6 % drops 2 drops, 2 times daily    vit A/vit C/vit E/zinc/copper (PRESERVISION AREDS ORAL) 1 tablet, Every 12 hours    white petrolatum-mineral oiL 94-3 % ophthalmic ointment 1 Application, Nightly        Recent Lab Results:    CBC:   Lab Results   Component Value Date    WBC 7.8 2023    RBC 3.64 (L) 2023    HGB 11.1 (L) 2023    HCT 33.6 (L) 2023     2023        CMP:    Lab Results   Component Value Date     2024    K 4.8 2024     2024    CO2 28 2024    BUN 41 (H) 2024    CREATININE 2.26 (H) 2024    GLUCOSE 90 2024    CALCIUM 8.4 (L) 2024       Magnesium:    Lab  "Results   Component Value Date    MG 1.59 (L) 02/28/2023       Lipid Profile:    Lab Results   Component Value Date    TRIG 62 09/13/2022    HDL 46.0 09/13/2022       TSH:    Lab Results   Component Value Date    TSH 2.33 07/16/2024       BNP:   Lab Results   Component Value Date     (H) 01/16/2024        PT/INR:    Lab Results   Component Value Date    PROTIME 11.6 09/04/2023    INR 1.0 09/04/2023       HgBA1c:    No results found for: \"HGBA1C\"    BMP:  Lab Results   Component Value Date     09/24/2024     07/16/2024     (L) 09/04/2023    NA CANCELED 03/02/2023     (L) 03/01/2023    K 4.8 09/24/2024    K 4.6 07/16/2024    K 4.5 09/04/2023    K CANCELED 03/02/2023    K 3.9 03/01/2023     09/24/2024     07/16/2024     09/04/2023    CL CANCELED 03/02/2023     03/01/2023    CO2 28 09/24/2024    CO2 28 07/16/2024    CO2 25 09/04/2023    CO2 CANCELED 03/02/2023    CO2 27 03/01/2023    BUN 41 (H) 09/24/2024    BUN 53 (H) 07/16/2024    BUN 44 (H) 09/04/2023    BUN CANCELED 03/02/2023    BUN 34 (H) 03/01/2023    CREATININE 2.26 (H) 09/24/2024    CREATININE 2.34 (H) 07/16/2024    CREATININE 1.84 (H) 09/04/2023    CREATININE CANCELED 03/02/2023    CREATININE 1.76 (H) 03/01/2023       CBC:  Lab Results   Component Value Date    WBC 7.8 09/04/2023    WBC CANCELED 03/02/2023    WBC 6.6 03/01/2023    RBC 3.64 (L) 09/04/2023    RBC CANCELED 03/02/2023    RBC 3.90 (L) 03/01/2023    HGB 11.1 (L) 09/04/2023    HGB CANCELED 03/02/2023    HGB 11.3 (L) 03/01/2023    HCT 33.6 (L) 09/04/2023    HCT CANCELED 03/02/2023    HCT 34.3 (L) 03/01/2023    MCV 92 09/04/2023    MCV CANCELED 03/02/2023    MCV 88 03/01/2023    MCHC 33.0 09/04/2023    MCHC CANCELED 03/02/2023    MCHC 32.9 03/01/2023    RDW 13.1 09/04/2023    RDW CANCELED 03/02/2023    RDW 13.3 03/01/2023     09/04/2023    PLT CANCELED 03/02/2023     03/01/2023       Cardiac Enzymes:    Lab Results   Component Value " Date    TROPHS 35 (H) 09/04/2023    TROPHS 33 (H) 09/04/2023    TROPHS 160 (H) 02/28/2023       Hepatic Function Panel:    Lab Results   Component Value Date    ALKPHOS 65 09/24/2024    ALT 17 09/24/2024    AST 20 09/24/2024    PROT 7.2 09/24/2024    BILITOT 0.4 09/24/2024    BILIDIR 0.0 09/04/2023         REVIEW OF SYSTEMS  Review of Systems   Constitutional: Negative.   Cardiovascular: Negative.    Respiratory: Negative.     Neurological: Negative.    All other systems reviewed and are negative.      VITALS  Vitals:    01/21/25 1029   BP: 132/64   Pulse: 68     Wt Readings from Last 4 Encounters:   01/21/25 78.9 kg (174 lb)   01/20/25 80.7 kg (178 lb)   10/17/24 81.2 kg (179 lb)   10/02/24 80.2 kg (176 lb 12.8 oz)       PHYSICAL EXAM  Constitutional:       Appearance: Healthy appearance.   Eyes:      Pupils: Pupils are equal, round, and reactive to light.   Pulmonary:      Effort: Pulmonary effort is normal.      Breath sounds: Normal breath sounds.   Cardiovascular:      PMI at left midclavicular line. Normal rate. Regular rhythm.      Murmurs: There is no murmur.      No gallop.  No click. No rub.   Pulses:     Intact distal pulses.   Musculoskeletal: Normal range of motion.      Cervical back: Normal range of motion. Skin:     General: Skin is warm and dry.   Neurological:      General: No focal deficit present.      Mental Status: Alert and oriented to person, place and time.

## 2025-01-21 NOTE — PATIENT INSTRUCTIONS
Continue same medications and treatments.   Patient educated on proper medication use.   Patient educated on risk factor modification.   Please bring any lab results from other providers / physicians to your next appointment.     Please bring all medicines, vitamins, and herbal supplements with you when you come to the office.     Prescriptions will not be filled unless you are compliant with your follow up appointments or have a follow up appointment scheduled as per instruction of your physician. Refills should be requested at the time of your visit.  6  month visit

## 2025-02-01 DIAGNOSIS — I10 BENIGN ESSENTIAL HYPERTENSION: ICD-10-CM

## 2025-02-03 RX ORDER — CARVEDILOL 12.5 MG/1
12.5 TABLET ORAL 2 TIMES DAILY
Qty: 180 TABLET | Refills: 1 | Status: SHIPPED | OUTPATIENT
Start: 2025-02-03

## 2025-03-13 PROCEDURE — RXMED WILLOW AMBULATORY MEDICATION CHARGE

## 2025-03-14 ENCOUNTER — PHARMACY VISIT (OUTPATIENT)
Dept: PHARMACY | Facility: CLINIC | Age: 89
End: 2025-03-14
Payer: COMMERCIAL

## 2025-04-21 ENCOUNTER — APPOINTMENT (OUTPATIENT)
Dept: CARDIOLOGY | Facility: HOSPITAL | Age: 89
DRG: 378 | End: 2025-04-21
Payer: MEDICARE

## 2025-04-21 ENCOUNTER — HOSPITAL ENCOUNTER (INPATIENT)
Facility: HOSPITAL | Age: 89
LOS: 1 days | Discharge: HOME | DRG: 378 | End: 2025-04-23
Attending: EMERGENCY MEDICINE | Admitting: HOSPITALIST
Payer: MEDICARE

## 2025-04-21 ENCOUNTER — OFFICE VISIT (OUTPATIENT)
Dept: PRIMARY CARE | Facility: CLINIC | Age: 89
End: 2025-04-21
Payer: MEDICARE

## 2025-04-21 VITALS
DIASTOLIC BLOOD PRESSURE: 80 MMHG | HEIGHT: 69 IN | WEIGHT: 170 LBS | SYSTOLIC BLOOD PRESSURE: 120 MMHG | HEART RATE: 86 BPM | TEMPERATURE: 95.9 F | BODY MASS INDEX: 25.18 KG/M2

## 2025-04-21 DIAGNOSIS — K62.5 RECTAL BLEEDING: Primary | ICD-10-CM

## 2025-04-21 DIAGNOSIS — J61 ASBESTOSIS (MULTI): ICD-10-CM

## 2025-04-21 DIAGNOSIS — R79.89 ELEVATED TROPONIN: ICD-10-CM

## 2025-04-21 DIAGNOSIS — N28.9 RENAL INSUFFICIENCY: ICD-10-CM

## 2025-04-21 DIAGNOSIS — D50.0 BLOOD LOSS ANEMIA: ICD-10-CM

## 2025-04-21 DIAGNOSIS — K92.2 GASTROINTESTINAL HEMORRHAGE, UNSPECIFIED GASTROINTESTINAL HEMORRHAGE TYPE: Primary | ICD-10-CM

## 2025-04-21 LAB
ABO GROUP (TYPE) IN BLOOD: NORMAL
ABO GROUP (TYPE) IN BLOOD: NORMAL
ALBUMIN SERPL BCP-MCNC: 3.3 G/DL (ref 3.4–5)
ALP SERPL-CCNC: 47 U/L (ref 33–136)
ALT SERPL W P-5'-P-CCNC: 16 U/L (ref 10–52)
ANION GAP SERPL CALC-SCNC: 10 MMOL/L (ref 10–20)
ANTIBODY SCREEN: NORMAL
APPEARANCE UR: CLEAR
APTT PPP: 26 SECONDS (ref 26–36)
AST SERPL W P-5'-P-CCNC: 19 U/L (ref 9–39)
ATRIAL RATE: 70 BPM
BASOPHILS # BLD AUTO: 0.01 X10*3/UL (ref 0–0.1)
BASOPHILS NFR BLD AUTO: 0.1 %
BILIRUB DIRECT SERPL-MCNC: 0.1 MG/DL (ref 0–0.3)
BILIRUB SERPL-MCNC: 0.3 MG/DL (ref 0–1.2)
BILIRUB UR STRIP.AUTO-MCNC: NEGATIVE MG/DL
BUN SERPL-MCNC: 57 MG/DL (ref 6–23)
CALCIUM SERPL-MCNC: 8.3 MG/DL (ref 8.6–10.3)
CARDIAC TROPONIN I PNL SERPL HS: 52 NG/L (ref 0–20)
CHLORIDE SERPL-SCNC: 107 MMOL/L (ref 98–107)
CO2 SERPL-SCNC: 25 MMOL/L (ref 21–32)
COLOR UR: ABNORMAL
CREAT SERPL-MCNC: 2.79 MG/DL (ref 0.5–1.3)
EGFRCR SERPLBLD CKD-EPI 2021: 21 ML/MIN/1.73M*2
EOSINOPHIL # BLD AUTO: 0.08 X10*3/UL (ref 0–0.4)
EOSINOPHIL NFR BLD AUTO: 1.1 %
ERYTHROCYTE [DISTWIDTH] IN BLOOD BY AUTOMATED COUNT: 13.9 % (ref 11.5–14.5)
GLUCOSE SERPL-MCNC: 97 MG/DL (ref 74–99)
GLUCOSE UR STRIP.AUTO-MCNC: NORMAL MG/DL
HCT VFR BLD AUTO: 21.5 % (ref 41–52)
HCT VFR BLD AUTO: 22.6 % (ref 41–52)
HGB BLD-MCNC: 6.9 G/DL (ref 13.5–17.5)
HGB BLD-MCNC: 7.2 G/DL (ref 13.5–17.5)
HOLD SPECIMEN: NORMAL
IMM GRANULOCYTES # BLD AUTO: 0.02 X10*3/UL (ref 0–0.5)
IMM GRANULOCYTES NFR BLD AUTO: 0.3 % (ref 0–0.9)
INR PPP: 1 (ref 0.9–1.1)
KETONES UR STRIP.AUTO-MCNC: NEGATIVE MG/DL
LEUKOCYTE ESTERASE UR QL STRIP.AUTO: NEGATIVE
LYMPHOCYTES # BLD AUTO: 0.58 X10*3/UL (ref 0.8–3)
LYMPHOCYTES NFR BLD AUTO: 8.2 %
MCH RBC QN AUTO: 30.1 PG (ref 26–34)
MCHC RBC AUTO-ENTMCNC: 31.9 G/DL (ref 32–36)
MCV RBC AUTO: 95 FL (ref 80–100)
MONOCYTES # BLD AUTO: 0.68 X10*3/UL (ref 0.05–0.8)
MONOCYTES NFR BLD AUTO: 9.7 %
MUCOUS THREADS #/AREA URNS AUTO: NORMAL /LPF
NEUTROPHILS # BLD AUTO: 5.67 X10*3/UL (ref 1.6–5.5)
NEUTROPHILS NFR BLD AUTO: 80.6 %
NITRITE UR QL STRIP.AUTO: NEGATIVE
NRBC BLD-RTO: 0 /100 WBCS (ref 0–0)
P AXIS: 43 DEGREES
P OFFSET: 172 MS
P ONSET: 108 MS
PH UR STRIP.AUTO: 7 [PH]
PLATELET # BLD AUTO: 193 X10*3/UL (ref 150–450)
POTASSIUM SERPL-SCNC: 5.3 MMOL/L (ref 3.5–5.3)
PR INTERVAL: 202 MS
PROT SERPL-MCNC: 6.7 G/DL (ref 6.4–8.2)
PROT UR STRIP.AUTO-MCNC: ABNORMAL MG/DL
PROTHROMBIN TIME: 10.5 SECONDS (ref 9.8–12.4)
Q ONSET: 209 MS
QRS COUNT: 12 BEATS
QRS DURATION: 108 MS
QT INTERVAL: 412 MS
QTC CALCULATION(BAZETT): 444 MS
QTC FREDERICIA: 433 MS
R AXIS: -7 DEGREES
RBC # BLD AUTO: 2.39 X10*6/UL (ref 4.5–5.9)
RBC # UR STRIP.AUTO: NEGATIVE MG/DL
RBC #/AREA URNS AUTO: NORMAL /HPF
RH FACTOR (ANTIGEN D): NORMAL
RH FACTOR (ANTIGEN D): NORMAL
SODIUM SERPL-SCNC: 137 MMOL/L (ref 136–145)
SP GR UR STRIP.AUTO: 1.01
T AXIS: -35 DEGREES
T OFFSET: 415 MS
UROBILINOGEN UR STRIP.AUTO-MCNC: NORMAL MG/DL
VENTRICULAR RATE: 70 BPM
WBC # BLD AUTO: 7 X10*3/UL (ref 4.4–11.3)
WBC #/AREA URNS AUTO: NORMAL /HPF

## 2025-04-21 PROCEDURE — 99291 CRITICAL CARE FIRST HOUR: CPT | Performed by: EMERGENCY MEDICINE

## 2025-04-21 PROCEDURE — G0378 HOSPITAL OBSERVATION PER HR: HCPCS

## 2025-04-21 PROCEDURE — G2211 COMPLEX E/M VISIT ADD ON: HCPCS | Performed by: INTERNAL MEDICINE

## 2025-04-21 PROCEDURE — 1159F MED LIST DOCD IN RCRD: CPT | Performed by: INTERNAL MEDICINE

## 2025-04-21 PROCEDURE — 2500000004 HC RX 250 GENERAL PHARMACY W/ HCPCS (ALT 636 FOR OP/ED): Performed by: HOSPITALIST

## 2025-04-21 PROCEDURE — 99223 1ST HOSP IP/OBS HIGH 75: CPT | Performed by: HOSPITALIST

## 2025-04-21 PROCEDURE — 81001 URINALYSIS AUTO W/SCOPE: CPT | Performed by: EMERGENCY MEDICINE

## 2025-04-21 PROCEDURE — 96361 HYDRATE IV INFUSION ADD-ON: CPT

## 2025-04-21 PROCEDURE — 85018 HEMOGLOBIN: CPT | Performed by: HOSPITALIST

## 2025-04-21 PROCEDURE — G8433 SCR FOR DEP NOT CPT DOC RSN: HCPCS | Performed by: INTERNAL MEDICINE

## 2025-04-21 PROCEDURE — 3079F DIAST BP 80-89 MM HG: CPT | Performed by: INTERNAL MEDICINE

## 2025-04-21 PROCEDURE — 1157F ADVNC CARE PLAN IN RCRD: CPT | Performed by: INTERNAL MEDICINE

## 2025-04-21 PROCEDURE — 36415 COLL VENOUS BLD VENIPUNCTURE: CPT | Performed by: EMERGENCY MEDICINE

## 2025-04-21 PROCEDURE — P9016 RBC LEUKOCYTES REDUCED: HCPCS

## 2025-04-21 PROCEDURE — 1123F ACP DISCUSS/DSCN MKR DOCD: CPT | Performed by: INTERNAL MEDICINE

## 2025-04-21 PROCEDURE — 1036F TOBACCO NON-USER: CPT | Performed by: INTERNAL MEDICINE

## 2025-04-21 PROCEDURE — 85730 THROMBOPLASTIN TIME PARTIAL: CPT | Performed by: EMERGENCY MEDICINE

## 2025-04-21 PROCEDURE — 84484 ASSAY OF TROPONIN QUANT: CPT | Performed by: EMERGENCY MEDICINE

## 2025-04-21 PROCEDURE — 86901 BLOOD TYPING SEROLOGIC RH(D): CPT | Performed by: EMERGENCY MEDICINE

## 2025-04-21 PROCEDURE — 93005 ELECTROCARDIOGRAM TRACING: CPT

## 2025-04-21 PROCEDURE — 80053 COMPREHEN METABOLIC PANEL: CPT | Performed by: EMERGENCY MEDICINE

## 2025-04-21 PROCEDURE — 86923 COMPATIBILITY TEST ELECTRIC: CPT

## 2025-04-21 PROCEDURE — 85610 PROTHROMBIN TIME: CPT | Performed by: EMERGENCY MEDICINE

## 2025-04-21 PROCEDURE — 2500000001 HC RX 250 WO HCPCS SELF ADMINISTERED DRUGS (ALT 637 FOR MEDICARE OP): Performed by: STUDENT IN AN ORGANIZED HEALTH CARE EDUCATION/TRAINING PROGRAM

## 2025-04-21 PROCEDURE — 3074F SYST BP LT 130 MM HG: CPT | Performed by: INTERNAL MEDICINE

## 2025-04-21 PROCEDURE — 85025 COMPLETE CBC W/AUTO DIFF WBC: CPT | Performed by: EMERGENCY MEDICINE

## 2025-04-21 PROCEDURE — 36415 COLL VENOUS BLD VENIPUNCTURE: CPT | Performed by: HOSPITALIST

## 2025-04-21 PROCEDURE — 99213 OFFICE O/P EST LOW 20 MIN: CPT | Performed by: INTERNAL MEDICINE

## 2025-04-21 PROCEDURE — 36430 TRANSFUSION BLD/BLD COMPNT: CPT

## 2025-04-21 PROCEDURE — 1160F RVW MEDS BY RX/DR IN RCRD: CPT | Performed by: INTERNAL MEDICINE

## 2025-04-21 PROCEDURE — 2500000004 HC RX 250 GENERAL PHARMACY W/ HCPCS (ALT 636 FOR OP/ED): Mod: JZ | Performed by: EMERGENCY MEDICINE

## 2025-04-21 PROCEDURE — 2500000001 HC RX 250 WO HCPCS SELF ADMINISTERED DRUGS (ALT 637 FOR MEDICARE OP): Performed by: HOSPITALIST

## 2025-04-21 PROCEDURE — 82248 BILIRUBIN DIRECT: CPT | Performed by: EMERGENCY MEDICINE

## 2025-04-21 PROCEDURE — 2500000002 HC RX 250 W HCPCS SELF ADMINISTERED DRUGS (ALT 637 FOR MEDICARE OP, ALT 636 FOR OP/ED): Performed by: HOSPITALIST

## 2025-04-21 PROCEDURE — 96374 THER/PROPH/DIAG INJ IV PUSH: CPT

## 2025-04-21 RX ORDER — AMIODARONE HYDROCHLORIDE 200 MG/1
200 TABLET ORAL DAILY
Status: DISCONTINUED | OUTPATIENT
Start: 2025-04-21 | End: 2025-04-23 | Stop reason: HOSPADM

## 2025-04-21 RX ORDER — PANTOPRAZOLE SODIUM 40 MG/10ML
40 INJECTION, POWDER, LYOPHILIZED, FOR SOLUTION INTRAVENOUS DAILY
Status: DISCONTINUED | OUTPATIENT
Start: 2025-04-21 | End: 2025-04-23 | Stop reason: HOSPADM

## 2025-04-21 RX ORDER — CARVEDILOL 12.5 MG/1
12.5 TABLET ORAL 2 TIMES DAILY
Status: DISCONTINUED | OUTPATIENT
Start: 2025-04-21 | End: 2025-04-23 | Stop reason: HOSPADM

## 2025-04-21 RX ORDER — LANOLIN ALCOHOL/MO/W.PET/CERES
200 CREAM (GRAM) TOPICAL DAILY
Status: DISCONTINUED | OUTPATIENT
Start: 2025-04-21 | End: 2025-04-23 | Stop reason: HOSPADM

## 2025-04-21 RX ORDER — LOSARTAN POTASSIUM 50 MG/1
50 TABLET ORAL DAILY
Status: DISCONTINUED | OUTPATIENT
Start: 2025-04-21 | End: 2025-04-23 | Stop reason: HOSPADM

## 2025-04-21 RX ORDER — ATORVASTATIN CALCIUM 20 MG/1
40 TABLET, FILM COATED ORAL NIGHTLY
Status: DISCONTINUED | OUTPATIENT
Start: 2025-04-21 | End: 2025-04-23 | Stop reason: HOSPADM

## 2025-04-21 RX ORDER — POLYETHYLENE GLYCOL 3350, SODIUM CHLORIDE, SODIUM BICARBONATE, POTASSIUM CHLORIDE 420; 11.2; 5.72; 1.48 G/4L; G/4L; G/4L; G/4L
4000 POWDER, FOR SOLUTION ORAL ONCE
Status: COMPLETED | OUTPATIENT
Start: 2025-04-21 | End: 2025-04-21

## 2025-04-21 RX ADMIN — MAGNESIUM OXIDE 400 MG (241.3 MG MAGNESIUM) TABLET 200 MG: TABLET at 18:37

## 2025-04-21 RX ADMIN — ATORVASTATIN CALCIUM 40 MG: 20 TABLET, FILM COATED ORAL at 21:15

## 2025-04-21 RX ADMIN — SODIUM CHLORIDE 1000 ML: 0.9 INJECTION, SOLUTION INTRAVENOUS at 11:34

## 2025-04-21 RX ADMIN — CARVEDILOL 12.5 MG: 12.5 TABLET, FILM COATED ORAL at 21:15

## 2025-04-21 RX ADMIN — AMIODARONE HYDROCHLORIDE 200 MG: 200 TABLET ORAL at 18:37

## 2025-04-21 RX ADMIN — POLYETHYLENE GLYCOL 3350, SODIUM CHLORIDE, SODIUM BICARBONATE AND POTASSIUM CHLORIDE WITH LEMON FLAVOR 4000 ML: 420; 11.2; 5.72; 1.48 POWDER, FOR SOLUTION ORAL at 21:15

## 2025-04-21 RX ADMIN — PANTOPRAZOLE SODIUM 40 MG: 40 INJECTION, POWDER, FOR SOLUTION INTRAVENOUS at 13:18

## 2025-04-21 RX ADMIN — LOSARTAN POTASSIUM 50 MG: 50 TABLET, FILM COATED ORAL at 18:37

## 2025-04-21 SDOH — SOCIAL STABILITY: SOCIAL INSECURITY: WITHIN THE LAST YEAR, HAVE YOU BEEN AFRAID OF YOUR PARTNER OR EX-PARTNER?: NO

## 2025-04-21 SDOH — SOCIAL STABILITY: SOCIAL INSECURITY
WITHIN THE LAST YEAR, HAVE YOU BEEN RAPED OR FORCED TO HAVE ANY KIND OF SEXUAL ACTIVITY BY YOUR PARTNER OR EX-PARTNER?: NO

## 2025-04-21 SDOH — ECONOMIC STABILITY: HOUSING INSECURITY: IN THE LAST 12 MONTHS, WAS THERE A TIME WHEN YOU WERE NOT ABLE TO PAY THE MORTGAGE OR RENT ON TIME?: NO

## 2025-04-21 SDOH — SOCIAL STABILITY: SOCIAL INSECURITY: HAS ANYONE EVER THREATENED TO HURT YOUR FAMILY OR YOUR PETS?: NO

## 2025-04-21 SDOH — ECONOMIC STABILITY: FOOD INSECURITY: WITHIN THE PAST 12 MONTHS, THE FOOD YOU BOUGHT JUST DIDN'T LAST AND YOU DIDN'T HAVE MONEY TO GET MORE.: NEVER TRUE

## 2025-04-21 SDOH — ECONOMIC STABILITY: FOOD INSECURITY: WITHIN THE PAST 12 MONTHS, YOU WORRIED THAT YOUR FOOD WOULD RUN OUT BEFORE YOU GOT THE MONEY TO BUY MORE.: NEVER TRUE

## 2025-04-21 SDOH — SOCIAL STABILITY: SOCIAL INSECURITY
WITHIN THE LAST YEAR, HAVE YOU BEEN KICKED, HIT, SLAPPED, OR OTHERWISE PHYSICALLY HURT BY YOUR PARTNER OR EX-PARTNER?: NO

## 2025-04-21 SDOH — SOCIAL STABILITY: SOCIAL INSECURITY: ABUSE: ADULT

## 2025-04-21 SDOH — SOCIAL STABILITY: SOCIAL INSECURITY: DO YOU FEEL UNSAFE GOING BACK TO THE PLACE WHERE YOU ARE LIVING?: NO

## 2025-04-21 SDOH — SOCIAL STABILITY: SOCIAL INSECURITY: WITHIN THE LAST YEAR, HAVE YOU BEEN HUMILIATED OR EMOTIONALLY ABUSED IN OTHER WAYS BY YOUR PARTNER OR EX-PARTNER?: NO

## 2025-04-21 SDOH — ECONOMIC STABILITY: HOUSING INSECURITY: AT ANY TIME IN THE PAST 12 MONTHS, WERE YOU HOMELESS OR LIVING IN A SHELTER (INCLUDING NOW)?: NO

## 2025-04-21 SDOH — ECONOMIC STABILITY: FOOD INSECURITY: HOW HARD IS IT FOR YOU TO PAY FOR THE VERY BASICS LIKE FOOD, HOUSING, MEDICAL CARE, AND HEATING?: NOT HARD AT ALL

## 2025-04-21 SDOH — SOCIAL STABILITY: SOCIAL INSECURITY: ARE YOU OR HAVE YOU BEEN THREATENED OR ABUSED PHYSICALLY, EMOTIONALLY, OR SEXUALLY BY ANYONE?: NO

## 2025-04-21 SDOH — SOCIAL STABILITY: SOCIAL INSECURITY: WERE YOU ABLE TO COMPLETE ALL THE BEHAVIORAL HEALTH SCREENINGS?: YES

## 2025-04-21 SDOH — SOCIAL STABILITY: SOCIAL INSECURITY: HAVE YOU HAD THOUGHTS OF HARMING ANYONE ELSE?: NO

## 2025-04-21 SDOH — ECONOMIC STABILITY: INCOME INSECURITY: IN THE PAST 12 MONTHS HAS THE ELECTRIC, GAS, OIL, OR WATER COMPANY THREATENED TO SHUT OFF SERVICES IN YOUR HOME?: NO

## 2025-04-21 SDOH — SOCIAL STABILITY: SOCIAL INSECURITY: ARE THERE ANY APPARENT SIGNS OF INJURIES/BEHAVIORS THAT COULD BE RELATED TO ABUSE/NEGLECT?: NO

## 2025-04-21 SDOH — ECONOMIC STABILITY: HOUSING INSECURITY: IN THE PAST 12 MONTHS, HOW MANY TIMES HAVE YOU MOVED WHERE YOU WERE LIVING?: 0

## 2025-04-21 SDOH — ECONOMIC STABILITY: TRANSPORTATION INSECURITY: IN THE PAST 12 MONTHS, HAS LACK OF TRANSPORTATION KEPT YOU FROM MEDICAL APPOINTMENTS OR FROM GETTING MEDICATIONS?: NO

## 2025-04-21 SDOH — SOCIAL STABILITY: SOCIAL INSECURITY: DO YOU FEEL ANYONE HAS EXPLOITED OR TAKEN ADVANTAGE OF YOU FINANCIALLY OR OF YOUR PERSONAL PROPERTY?: NO

## 2025-04-21 SDOH — SOCIAL STABILITY: SOCIAL INSECURITY: HAVE YOU HAD ANY THOUGHTS OF HARMING ANYONE ELSE?: NO

## 2025-04-21 SDOH — SOCIAL STABILITY: SOCIAL INSECURITY: DOES ANYONE TRY TO KEEP YOU FROM HAVING/CONTACTING OTHER FRIENDS OR DOING THINGS OUTSIDE YOUR HOME?: NO

## 2025-04-21 ASSESSMENT — COGNITIVE AND FUNCTIONAL STATUS - GENERAL
MOVING TO AND FROM BED TO CHAIR: A LITTLE
PATIENT BASELINE BEDBOUND: NO
STANDING UP FROM CHAIR USING ARMS: A LITTLE
DAILY ACTIVITIY SCORE: 24
MOVING TO AND FROM BED TO CHAIR: A LITTLE
MOBILITY SCORE: 20
STANDING UP FROM CHAIR USING ARMS: A LITTLE
WALKING IN HOSPITAL ROOM: A LITTLE
WALKING IN HOSPITAL ROOM: A LITTLE
CLIMB 3 TO 5 STEPS WITH RAILING: A LITTLE
MOBILITY SCORE: 20
DAILY ACTIVITIY SCORE: 24
CLIMB 3 TO 5 STEPS WITH RAILING: A LITTLE

## 2025-04-21 ASSESSMENT — PATIENT HEALTH QUESTIONNAIRE - PHQ9
SUM OF ALL RESPONSES TO PHQ9 QUESTIONS 1 AND 2: 0
2. FEELING DOWN, DEPRESSED OR HOPELESS: NOT AT ALL
1. LITTLE INTEREST OR PLEASURE IN DOING THINGS: NOT AT ALL
2. FEELING DOWN, DEPRESSED OR HOPELESS: NOT AT ALL
SUM OF ALL RESPONSES TO PHQ9 QUESTIONS 1 & 2: 0
1. LITTLE INTEREST OR PLEASURE IN DOING THINGS: NOT AT ALL

## 2025-04-21 ASSESSMENT — ACTIVITIES OF DAILY LIVING (ADL)
ASSISTIVE_DEVICE: WALKER;EYEGLASSES;SHOWER CHAIR
LACK_OF_TRANSPORTATION: NO
HEARING - LEFT EAR: FUNCTIONAL
TOILETING: INDEPENDENT
DRESSING YOURSELF: INDEPENDENT
JUDGMENT_ADEQUATE_SAFELY_COMPLETE_DAILY_ACTIVITIES: YES
HEARING - RIGHT EAR: FUNCTIONAL
GROOMING: INDEPENDENT
LACK_OF_TRANSPORTATION: NO
WALKS IN HOME: INDEPENDENT
BATHING: INDEPENDENT
ADEQUATE_TO_COMPLETE_ADL: YES
PATIENT'S MEMORY ADEQUATE TO SAFELY COMPLETE DAILY ACTIVITIES?: YES
FEEDING YOURSELF: INDEPENDENT

## 2025-04-21 ASSESSMENT — ENCOUNTER SYMPTOMS
ABDOMINAL PAIN: 0
WEAKNESS: 1
CARDIOVASCULAR NEGATIVE: 1
RESPIRATORY NEGATIVE: 1
ARTHRALGIAS: 0
NUMBNESS: 0
OCCASIONAL FEELINGS OF UNSTEADINESS: 1
BLOOD IN STOOL: 1
DEPRESSION: 0
CONSTITUTIONAL NEGATIVE: 1
DIAPHORESIS: 0
LOSS OF SENSATION IN FEET: 0
HEMATOCHEZIA: 1

## 2025-04-21 ASSESSMENT — COLUMBIA-SUICIDE SEVERITY RATING SCALE - C-SSRS
1. IN THE PAST MONTH, HAVE YOU WISHED YOU WERE DEAD OR WISHED YOU COULD GO TO SLEEP AND NOT WAKE UP?: NO
2. HAVE YOU ACTUALLY HAD ANY THOUGHTS OF KILLING YOURSELF?: NO
1. IN THE PAST MONTH, HAVE YOU WISHED YOU WERE DEAD OR WISHED YOU COULD GO TO SLEEP AND NOT WAKE UP?: NO
6. HAVE YOU EVER DONE ANYTHING, STARTED TO DO ANYTHING, OR PREPARED TO DO ANYTHING TO END YOUR LIFE?: NO
6. HAVE YOU EVER DONE ANYTHING, STARTED TO DO ANYTHING, OR PREPARED TO DO ANYTHING TO END YOUR LIFE?: NO

## 2025-04-21 ASSESSMENT — LIFESTYLE VARIABLES
HAVE YOU EVER FELT YOU SHOULD CUT DOWN ON YOUR DRINKING: NO
EVER HAD A DRINK FIRST THING IN THE MORNING TO STEADY YOUR NERVES TO GET RID OF A HANGOVER: NO
AUDIT-C TOTAL SCORE: 0
HAVE PEOPLE ANNOYED YOU BY CRITICIZING YOUR DRINKING: NO
TOTAL SCORE: 0
EVER FELT BAD OR GUILTY ABOUT YOUR DRINKING: NO
HOW MANY STANDARD DRINKS CONTAINING ALCOHOL DO YOU HAVE ON A TYPICAL DAY: PATIENT DOES NOT DRINK
HOW OFTEN DO YOU HAVE A DRINK CONTAINING ALCOHOL: NEVER
HOW OFTEN DO YOU HAVE 6 OR MORE DRINKS ON ONE OCCASION: NEVER
SKIP TO QUESTIONS 9-10: 1
AUDIT-C TOTAL SCORE: 0

## 2025-04-21 ASSESSMENT — PAIN SCALES - GENERAL
PAINLEVEL_OUTOF10: 0 - NO PAIN
PAINLEVEL_OUTOF10: 0 - NO PAIN

## 2025-04-21 ASSESSMENT — PAIN - FUNCTIONAL ASSESSMENT: PAIN_FUNCTIONAL_ASSESSMENT: 0-10

## 2025-04-21 NOTE — ED PROVIDER NOTES
"HPI   Chief Complaint   Patient presents with    Rectal Bleeding     \"Here for rectal bleeding that has been going on for a couple of days.  Dr. Rodriguez sent me over here.  On aspirin.\"  Bright red with clots         History provided by:  Patient    Chief Complaint   Patient presents with    Rectal Bleeding     \"Here for rectal bleeding that has been going on for a couple of days.  Dr. Rodriguez sent me over here.  On aspirin.\"  Bright red with clots       History of Present Illness:  Brian Lennon is a 89 y.o. male presents with rectal bleeding for the past couple of days.  He states it is bright red with clots.  No melena.  The patient does take aspirin.  No abdominal pain.  No back or flank pain.  No nausea or vomiting.  No diarrhea.  No fever or chills.  No trauma or travel.  No sick contacts.      PMFSH:   As per HPI, otherwise nurses notes reviewed in EMR.    Past Medical History: Medical History[1]   Past Surgical History: Surgical History[2]   Family History: Family History[3]   Social History:  Social History[4]  Allergies: Allergies[5]  Current Outpatient Medications   Medication Instructions    amiodarone (PACERONE) 200 mg, oral, Daily    aspirin (ECOTRIN LOW STRENGTH) 81 mg, Daily    atorvastatin (LIPITOR) 40 mg, oral, Nightly    carvedilol (COREG) 12.5 mg, oral, 2 times daily    cholecalciferol (VITAMIN D3) 25 mcg, oral, Daily    hydroCHLOROthiazide (MICROZIDE) 12.5 mg, oral, Daily    losartan (COZAAR) 50 mg, oral, Daily    magnesium oxide 400 mg, oral, Daily    meclizine (Antivert) 12.5 mg tablet 1 tablet, 3 times daily PRN    mirabegron (MYRBETRIQ) 50 mg, oral, Daily    nitroglycerin (NITROSTAT) 0.4 mg, Every 5 min PRN    propylene glycoL 0.6 % drops 2 drops, 2 times daily    vit A/vit C/vit E/zinc/copper (PRESERVISION AREDS ORAL) 1 tablet, Every 12 hours    white petrolatum-mineral oiL 94-3 % ophthalmic ointment 1 Application, Nightly              Patient History   Medical History[6]  Surgical " "History[7]  Family History[8]  Social History[9]    Physical Exam   ED Triage Vitals [04/21/25 1022]   Temperature Heart Rate Respirations BP   36.8 °C (98.2 °F) 73 16 141/65      Pulse Ox Temp Source Heart Rate Source Patient Position   97 % Temporal Monitor Sitting      BP Location FiO2 (%)     Right arm --       Physical Exam  Physical Exam:    ED Triage Vitals [04/21/25 1022]   Temperature Heart Rate Respirations BP   36.8 °C (98.2 °F) 73 16 141/65      Pulse Ox Temp Source Heart Rate Source Patient Position   97 % Temporal Monitor Sitting      BP Location FiO2 (%)     Right arm --         Patient Vitals for the past 24 hrs:   BP Temp Temp src Pulse Resp SpO2 Height Weight   04/21/25 1122 144/62 36.4 °C (97.5 °F) Temporal 75 17 97 % -- --   04/21/25 1022 141/65 36.8 °C (98.2 °F) Temporal 73 16 97 % 1.753 m (5' 9\") 77.1 kg (170 lb)       Constitutional: Vital signs per nursing notes.  Well developed, well nourished.  Mild acute distress.    Psychiatric: alert and oriented to person, place, and time; no abnormalities of mood or affect; memory intact  Eyes: PERRL; conjunctivae and lids normal; EOMI  ENT: otoscopic exam of external canal and TM´s normal; nasal mucosa, turbinates, and septum normal; mouth, tongue, and pharynx normal; pharynx without edema, exudate, or injection  Respiratory: normal respiratory effort and excursion; no rales, rhonchi, or wheezes; equal air entry  Cardiovascular: regular rate and rhythm; no murmurs, rubs or gallops; symmetric pulses; no edema; normal capillary refill; distal pulses present  Neurological: normal speech; CN II-XII grossly intact; normal motor and sensory function; no nystagmus  GI: no masses, tenderness, rebound or guarding; no palpable, pulsatile mass; no organomegaly; no hernia; normal bowel sounds; (-) Avilez´s sign; (-) McBurney´s sign; (-) CVA tenderness  Lymphatic: no adenopathy of neck  Musculoskeletal: normal gait and station; normal digits and nails; no gross " tendon or ligament injury; normal to palpation; normal strength/tone; neurovascular status intact; (-) Radha´s sign  Skin: normal to inspection; normal to palpation; no rash  GCS: 15      ED Course & MDM   Diagnoses as of 04/21/25 1302   Gastrointestinal hemorrhage, unspecified gastrointestinal hemorrhage type   Blood loss anemia   Renal insufficiency   Elevated troponin                 No data recorded                                 Medical Decision Making  Medical Decision Making:    EKG: My interpretation of EKG is normal sinus rhythm at 70 bpm with first-degree AV block and nonspecific ST-T changes    Labs:   Labs Reviewed   COMPREHENSIVE METABOLIC PANEL - Abnormal       Result Value    Glucose 97      Sodium 137      Potassium 5.3      Chloride 107      Bicarbonate 25      Anion Gap 10      Urea Nitrogen 57 (*)     Creatinine 2.79 (*)     eGFR 21 (*)     Calcium 8.3 (*)     Albumin 3.3 (*)     Alkaline Phosphatase 47      Total Protein 6.7      AST 19      Bilirubin, Total 0.3      ALT 16     CBC WITH AUTO DIFFERENTIAL - Abnormal    WBC 7.0      nRBC 0.0      RBC 2.39 (*)     Hemoglobin 7.2 (*)     Hematocrit 22.6 (*)     MCV 95      MCH 30.1      MCHC 31.9 (*)     RDW 13.9      Platelets 193      Neutrophils % 80.6      Immature Granulocytes %, Automated 0.3      Lymphocytes % 8.2      Monocytes % 9.7      Eosinophils % 1.1      Basophils % 0.1      Neutrophils Absolute 5.67 (*)     Immature Granulocytes Absolute, Automated 0.02      Lymphocytes Absolute 0.58 (*)     Monocytes Absolute 0.68      Eosinophils Absolute 0.08      Basophils Absolute 0.01     TROPONIN I, HIGH SENSITIVITY - Abnormal    Troponin I, High Sensitivity 52 (*)     Narrative:     Less than 99th percentile of normal range cutoff-  Female and children under 18 years old <14 ng/L; Male <21 ng/L: Negative  Repeat testing should be performed if clinically indicated.     Female and children under 18 years old 14-50 ng/L; Male 21-50  ng/L:  Consistent with possible cardiac damage and possible increased clinical   risk. Serial measurements may help to assess extent of myocardial damage.     >50 ng/L: Consistent with cardiac damage, increased clinical risk and  myocardial infarction. Serial measurements may help assess extent of   myocardial damage.      NOTE: Children less than 1 year old may have higher baseline troponin   levels and results should be interpreted in conjunction with the overall   clinical context.     NOTE: Troponin I testing is performed using a different   testing methodology at [facility] than at other   [facility]. Direct result comparisons should only   be made within the same method.   URINALYSIS WITH REFLEX CULTURE AND MICROSCOPIC - Abnormal    Color, Urine Light-Yellow      Appearance, Urine Clear      Specific Gravity, Urine 1.014      pH, Urine 7.0      Protein, Urine 30 (1+) (*)     Glucose, Urine Normal      Blood, Urine NEGATIVE      Ketones, Urine NEGATIVE      Bilirubin, Urine NEGATIVE      Urobilinogen, Urine Normal      Nitrite, Urine NEGATIVE      Leukocyte Esterase, Urine NEGATIVE     PROTIME-INR - Normal    Protime 10.5      INR 1.0     APTT - Normal    aPTT 26      Narrative:     The APTT is no longer used for monitoring Unfractionated Heparin Therapy. For monitoring Heparin Therapy, use the Heparin Assay.   BILIRUBIN, DIRECT - Normal    Bilirubin, Direct 0.1     TYPE AND SCREEN    ABO TYPE O      Rh TYPE POS      ANTIBODY SCREEN NEG     URINALYSIS WITH REFLEX CULTURE AND MICROSCOPIC    Narrative:     The following orders were created for panel order Urinalysis with Reflex Culture and Microscopic.  Procedure                               Abnormality         Status                     ---------                               -----------         ------                     Urinalysis with Reflex C...[[id number]]  Abnormal            Final result               Extra Urine Gray Tube[[id number]]                             In process                   Please view results for these tests on the individual orders.   EXTRA URINE GRAY TUBE   URINALYSIS MICROSCOPIC WITH REFLEX CULTURE    WBC, Urine NONE      RBC, Urine 1-2      Mucus, Urine FEW         Diagnostic Imaging:   No orders to display       ED Medication Administration:   Medications   pantoprazole (Protonix) injection 40 mg (has no administration in time range)   sodium chloride 0.9 % bolus 1,000 mL (1,000 mL intravenous New Bag 4/21/25 1134)       ED Course:     Brian Lennon is a 89 y.o. male presents with rectal bleeding.    Differential Diagnoses Considered:  GI bleed, diverticulosis, hemorrhoid    Patient presents with acute GI bleed.     Lab work to evaluate for evidence of severe anemia, thrombocytopenia, and significant electrolyte abnormality, including hypokalemia, hyperkalemia, hypernatremia, hyponatremia, hyperglycemia, or hypoglycemia.      LFTs to evaluate for acute hepatitis.    Lipase to evaluate for acute pancreatitis.    PT/INR/PTT to evaluate for evidence of coagulopathy.    EKG to evaluate for evidence of arrhythmia/ACS.      Diagnoses as of 04/21/25 1302   Gastrointestinal hemorrhage, unspecified gastrointestinal hemorrhage type   Blood loss anemia   Renal insufficiency   Elevated troponin       Abnormal Labs Reviewed   COMPREHENSIVE METABOLIC PANEL - Abnormal; Notable for the following components:       Result Value    Urea Nitrogen 57 (*)     Creatinine 2.79 (*)     eGFR 21 (*)     Calcium 8.3 (*)     Albumin 3.3 (*)     All other components within normal limits   CBC WITH AUTO DIFFERENTIAL - Abnormal; Notable for the following components:    RBC 2.39 (*)     Hemoglobin 7.2 (*)     Hematocrit 22.6 (*)     MCHC 31.9 (*)     Neutrophils Absolute 5.67 (*)     Lymphocytes Absolute 0.58 (*)     All other components within normal limits   TROPONIN I, HIGH SENSITIVITY - Abnormal; Notable for the following components:    Troponin I, High  Sensitivity 52 (*)     All other components within normal limits    Narrative:     Less than 99th percentile of normal range cutoff-  Female and children under 18 years old <14 ng/L; Male <21 ng/L: Negative  Repeat testing should be performed if clinically indicated.     Female and children under 18 years old 14-50 ng/L; Male 21-50 ng/L:  Consistent with possible cardiac damage and possible increased clinical   risk. Serial measurements may help to assess extent of myocardial damage.     >50 ng/L: Consistent with cardiac damage, increased clinical risk and  myocardial infarction. Serial measurements may help assess extent of   myocardial damage.      NOTE: Children less than 1 year old may have higher baseline troponin   levels and results should be interpreted in conjunction with the overall   clinical context.     NOTE: Troponin I testing is performed using a different   testing methodology at [facility] than at other   [facility]. Direct result comparisons should only   be made within the same method.   URINALYSIS WITH REFLEX CULTURE AND MICROSCOPIC - Abnormal; Notable for the following components:    Protein, Urine 30 (1+) (*)     All other components within normal limits       BP      Temp      Pulse     Resp      SpO2            Diagnostic evaluation was completed.  Troponin was elevated at 52.  Metabolic panel showed a normal glucose.  Sodium potassium in the normal range.  There is elevation of his BUN and creatinine of 57 and 2.79.  Liver function tests are normal.  INR is 1.0.  CBC shows normal white blood cell count with anemia with a hemoglobin of 7.2.  Platelets are in the normal range.    [place] Bleeding Score is 10 points which is high risk for GI bleed.  Therefore the patient is likely to require medical intervention which may include transfusion, endoscopy or surgery.    Patient was treated with IV normal saline and IV Protonix.    Medications administered improved  the patient's condition.    I suspect the patient is likely suffering from a GI bleed.  I did consider getting a CT angiogram to evaluate for source of bleeding but the patient's kidney function prohibits that at this time.  Patient will require hospitalization for further workup and evaluation.  At this time he does not require emergent transfusion as his hemoglobin is greater than 7, but he may require it during this hospitalization.    I have reviewed the patient's history, physical exam, and test information with the consultant,   EVELIA Gil (GI)                , who agrees to care for the patient.     The patient's condition requires ongoing treatment and evaluation necessitating hospital admission.  I have reviewed the patient's history, physical exam, and test information with the admitting physician,    Dr. Zamora               , who agrees to hospitalize the patient.     I discussed the results and plan for hospitalization with the patient and/or family/friend if present.  Questions were addressed.  Patient and/or family/friend expressed understanding.    Shared decision making made with patient, and/or family, who agrees with plan.        Diagnosis:   1. Gastrointestinal hemorrhage, unspecified gastrointestinal hemorrhage type    2. Blood loss anemia    3. Renal insufficiency    4. Elevated troponin                 Procedure  Procedures         [1] History reviewed. No pertinent past medical history.  [2]   Past Surgical History:  Procedure Laterality Date    OTHER SURGICAL HISTORY  05/08/2019    Cataract surgery    OTHER SURGICAL HISTORY  05/08/2019    Knee replacement    OTHER SURGICAL HISTORY  05/08/2019    Heart surgery    OTHER SURGICAL HISTORY  03/31/2022    Colonoscopy    OTHER SURGICAL HISTORY  03/31/2022    Eye surgery    OTHER SURGICAL HISTORY  03/31/2022    Trabeculectomy   [3]   Family History  Problem Relation Name Age of Onset    Diabetes Mother     [4]   Social History  Tobacco Use    Smoking  status: Former     Current packs/day: 0.00     Types: Cigarettes     Quit date:      Years since quittin.3    Smokeless tobacco: Never   Vaping Use    Vaping status: Never Used   Substance Use Topics    Alcohol use: Not Currently    Drug use: Never   [5] No Known Allergies  [6] History reviewed. No pertinent past medical history.  [7]   Past Surgical History:  Procedure Laterality Date    OTHER SURGICAL HISTORY  2019    Cataract surgery    OTHER SURGICAL HISTORY  2019    Knee replacement    OTHER SURGICAL HISTORY  2019    Heart surgery    OTHER SURGICAL HISTORY  2022    Colonoscopy    OTHER SURGICAL HISTORY  2022    Eye surgery    OTHER SURGICAL HISTORY  2022    Trabeculectomy   [8]   Family History  Problem Relation Name Age of Onset    Diabetes Mother     [9]   Social History  Tobacco Use    Smoking status: Former     Current packs/day: 0.00     Types: Cigarettes     Quit date:      Years since quittin.3    Smokeless tobacco: Never   Vaping Use    Vaping status: Never Used   Substance Use Topics    Alcohol use: Not Currently    Drug use: Never        Andreas SHAW MD  [phone]

## 2025-04-21 NOTE — Clinical Note
Patient tolerated procedure well. Appears comfortable with no complaints of pain. VS stable. Arousable prior to transport. Patient transported to [facility] via cart.  Report called per CRNA.   Handoff completed.

## 2025-04-21 NOTE — Clinical Note
Huddle and Timeout completed together with team. Patient wristband and VAHID information verified.  Anesthesia safety check completed. Patient was oriented and participated.

## 2025-04-21 NOTE — H&P
History and Physical        ASSESSMENT AND PLAN:     # GI bleed, probable lower GI bleed  # Acute blood loss anemia  -Presented to the emergency room with worsening fatigue, bright blood per rectum.  - Hemoglobin on admission 7.2, baseline 11  -Continue Protonix IV daily  -Current hemoglobin hematocrit  -GI consulted    # CKD stage IV  -Around baseline     # History of coronary artery disease status post CABG  # History of nonischemic cardiomyopathy  # PVC  -History of nonischemic cardiomyopathy with EF of 30%, overall improved with most recent echocardiogram showed EF  -Hold aspirin      VTE Prophylaxis: SCD      Robert Franco MD    HISTORY OF PRESENT ILLNESS:   Chief Complaint: Bright blood per rectum.    History Of Present Illness:    Brian Lennon is a 89 y.o. male with a significant past medical history of CKD stage IV, history of marginal lymphoma, coronary artery disease status post CABG, hypertension nonischemic cardiomyopathy with history of low EF, normalized after medications, history of nonsustained ventricular tachycardia, who presented to the emergency room with generalized weakness and bright blood per rectum.      He states that his symptoms started several days ago, he had noticed abruptly, bright blood per rectum with clots.  He started to become more weak as the days progressed, he saw his primary care doctor who advised him to come to the emergency room    He states that he had symptoms similar to this several years ago, he underwent a colonoscopy which he believes did not show any acute findings.    ED course: Hb 7.2, baseline 11, 1L bolus; Protonix x 1    Currently, admits to fatigue, denies chest pain, shortness of breath.         Review of systems: 10 point review of systems is otherwise negative except as mentioned above.    PAST HISTORIES:       Past Medical History:  He has no past medical history on file.    Past Surgical History:  He has a past surgical history that  "includes Other surgical history (05/08/2019); Other surgical history (05/08/2019); Other surgical history (05/08/2019); Other surgical history (03/31/2022); Other surgical history (03/31/2022); and Other surgical history (03/31/2022).      Social History:  He reports that he quit smoking about 11 years ago. His smoking use included cigarettes. He has never used smokeless tobacco. He reports that he does not currently use alcohol. He reports that he does not use drugs.    Family History:  Family History[1]     Allergies:  Patient has no known allergies.    OBJECTIVE:       Last Recorded Vitals:  Vitals:    04/21/25 1022 04/21/25 1122 04/21/25 1329   BP: 141/65 144/62 145/62   BP Location: Right arm Left arm    Patient Position: Sitting     Pulse: 73 75 76   Resp: 16 17 16   Temp: 36.8 °C (98.2 °F) 36.4 °C (97.5 °F)    TempSrc: Temporal Temporal    SpO2: 97% 97% 98%   Weight: 77.1 kg (170 lb)     Height: 1.753 m (5' 9\")         Last I/O:  No intake/output data recorded.    Physical Exam      PHYSICAL EXAM:   GENERAL: Laying in bed, does not appear to be in any distress.   HEENT: HEAD: Normocephalic atraumatic.  Neck: Supple.  Eyes: Pupils are reactive to direct light.   CVS: S1, S2 heard. Regular rate and rhythm  LUNGS: Clear to auscultate bilaterally. No wheezing or rhonchi appreciated.  ABDOMEN: Soft, nontender to palpate. Positive bowel sounds. No guarding or rebound appreciated.  NEUROLOGICAL: No focal neurological deficits appreciated. Cranial nerves are grossly intact.  EXTREMITIES: Dorsalis pedis pulses can be appreciated. No edema appreciated.  SKIN:  Grossly intact, warm and dry.        Scheduled Medications  Scheduled Medications[2]  PRN Medications  PRN Medications[3]  Continuous Medications  Continuous Medications[4]    Outpatient Medications:  Prior to Admission medications    Medication Sig Start Date End Date Taking? Authorizing Provider   amiodarone (Pacerone) 200 mg tablet Take 1 tablet (200 mg) by " mouth once daily. 11/21/24 11/21/25  Jose M Loja MD   aspirin (Ecotrin Low Strength) 81 mg EC tablet Take 1 tablet (81 mg) by mouth once daily. 5/1/19   Historical Provider, MD   atorvastatin (Lipitor) 40 mg tablet Take 1 tablet (40 mg) by mouth once daily at bedtime. 1/20/25   Jason Rodriguez MD   carvedilol (Coreg) 12.5 mg tablet TAKE 1 TABLET BY MOUTH TWICE A DAY 2/3/25   Jason Rodriguez MD   cholecalciferol (Vitamin D3) 25 MCG (1000 UT) capsule Take 1 capsule (25 mcg) by mouth once daily. 12/16/24 12/16/25  Jason Rodriguez MD   hydroCHLOROthiazide (Microzide) 12.5 mg tablet Take 1 tablet (12.5 mg) by mouth once daily. 11/21/24 11/21/25  Jose M Loja MD   losartan (Cozaar) 50 mg tablet Take 1 tablet (50 mg) by mouth once daily. 10/4/24 9/29/25  Jose M Loja MD   magnesium oxide 400 mg magnesium capsule Take 1 capsule (400 mg) by mouth once daily. 11/21/24 11/21/25  oJse M Loja MD   meclizine (Antivert) 12.5 mg tablet Take 1 tablet (12.5 mg) by mouth 3 times a day as needed. 5/1/19   Historical Provider, MD   mirabegron (Myrbetriq) 50 mg tablet extended release 24 hr 24 hr tablet Take 1 tablet (50 mg) by mouth once daily. 10/17/24   Jason Rodriguez MD   nitroglycerin (Nitrostat) 0.4 mg SL tablet Place 1 tablet (0.4 mg) under the tongue every 5 minutes if needed for chest pain (for up to 3 doses, call 911 if pain persists). 5/1/19   Historical Provider, MD   propylene glycoL 0.6 % drops Administer 2 drops into affected eye(s) in the morning and at bedtime. 4/28/21   Historical Provider, MD   vit A/vit C/vit E/zinc/copper (PRESERVISION AREDS ORAL) Take 1 tablet by mouth every 12 (twelve) hours.    Historical Provider, MD   white petrolatum-mineral oiL 94-3 % ophthalmic ointment Apply 1 Application to both eyes once daily at bedtime. 4/28/21   Historical Provider, MD       LABS AND IMAGING:     Labs:  Results for orders placed or performed during the hospital encounter of 04/21/25 (from the past  24 hours)   ECG 12 Lead   Result Value Ref Range    Ventricular Rate 70 BPM    Atrial Rate 70 BPM    NE Interval 202 ms    QRS Duration 108 ms    QT Interval 412 ms    QTC Calculation(Bazett) 444 ms    P Axis 43 degrees    R Axis -7 degrees    T Axis -35 degrees    QRS Count 12 beats    Q Onset 209 ms    P Onset 108 ms    P Offset 172 ms    T Offset 415 ms    QTC Fredericia 433 ms   Comprehensive Metabolic Panel   Result Value Ref Range    Glucose 97 74 - 99 mg/dL    Sodium 137 136 - 145 mmol/L    Potassium 5.3 3.5 - 5.3 mmol/L    Chloride 107 98 - 107 mmol/L    Bicarbonate 25 21 - 32 mmol/L    Anion Gap 10 10 - 20 mmol/L    Urea Nitrogen 57 (H) 6 - 23 mg/dL    Creatinine 2.79 (H) 0.50 - 1.30 mg/dL    eGFR 21 (L) >60 mL/min/1.73m*2    Calcium 8.3 (L) 8.6 - 10.3 mg/dL    Albumin 3.3 (L) 3.4 - 5.0 g/dL    Alkaline Phosphatase 47 33 - 136 U/L    Total Protein 6.7 6.4 - 8.2 g/dL    AST 19 9 - 39 U/L    Bilirubin, Total 0.3 0.0 - 1.2 mg/dL    ALT 16 10 - 52 U/L   CBC and Auto Differential   Result Value Ref Range    WBC 7.0 4.4 - 11.3 x10*3/uL    nRBC 0.0 0.0 - 0.0 /100 WBCs    RBC 2.39 (L) 4.50 - 5.90 x10*6/uL    Hemoglobin 7.2 (L) 13.5 - 17.5 g/dL    Hematocrit 22.6 (L) 41.0 - 52.0 %    MCV 95 80 - 100 fL    MCH 30.1 26.0 - 34.0 pg    MCHC 31.9 (L) 32.0 - 36.0 g/dL    RDW 13.9 11.5 - 14.5 %    Platelets 193 150 - 450 x10*3/uL    Neutrophils % 80.6 40.0 - 80.0 %    Immature Granulocytes %, Automated 0.3 0.0 - 0.9 %    Lymphocytes % 8.2 13.0 - 44.0 %    Monocytes % 9.7 2.0 - 10.0 %    Eosinophils % 1.1 0.0 - 6.0 %    Basophils % 0.1 0.0 - 2.0 %    Neutrophils Absolute 5.67 (H) 1.60 - 5.50 x10*3/uL    Immature Granulocytes Absolute, Automated 0.02 0.00 - 0.50 x10*3/uL    Lymphocytes Absolute 0.58 (L) 0.80 - 3.00 x10*3/uL    Monocytes Absolute 0.68 0.05 - 0.80 x10*3/uL    Eosinophils Absolute 0.08 0.00 - 0.40 x10*3/uL    Basophils Absolute 0.01 0.00 - 0.10 x10*3/uL   Protime-INR   Result Value Ref Range    Protime 10.5 9.8  - 12.4 seconds    INR 1.0 0.9 - 1.1   aPTT   Result Value Ref Range    aPTT 26 26 - 36 seconds   Type And Screen   Result Value Ref Range    ABO TYPE O     Rh TYPE POS     ANTIBODY SCREEN NEG    Troponin I, High Sensitivity   Result Value Ref Range    Troponin I, High Sensitivity 52 (HH) 0 - 20 ng/L   Bilirubin, Direct   Result Value Ref Range    Bilirubin, Direct 0.1 0.0 - 0.3 mg/dL   Urinalysis with Reflex Culture and Microscopic   Result Value Ref Range    Color, Urine Light-Yellow Light-Yellow, Yellow, Dark-Yellow    Appearance, Urine Clear Clear    Specific Gravity, Urine 1.014 1.005 - 1.035    pH, Urine 7.0 5.0, 5.5, 6.0, 6.5, 7.0, 7.5, 8.0    Protein, Urine 30 (1+) (A) NEGATIVE, 10 (TRACE), 20 (TRACE) mg/dL    Glucose, Urine Normal Normal mg/dL    Blood, Urine NEGATIVE NEGATIVE mg/dL    Ketones, Urine NEGATIVE NEGATIVE mg/dL    Bilirubin, Urine NEGATIVE NEGATIVE mg/dL    Urobilinogen, Urine Normal Normal mg/dL    Nitrite, Urine NEGATIVE NEGATIVE    Leukocyte Esterase, Urine NEGATIVE NEGATIVE   Urinalysis Microscopic   Result Value Ref Range    WBC, Urine NONE 1-5, NONE /HPF    RBC, Urine 1-2 NONE, 1-2, 3-5 /HPF    Mucus, Urine FEW Reference range not established. /LPF        Imaging:  ECG 12 Lead  Normal sinus rhythm  Possible Anterior infarct , age undetermined  Abnormal ECG  When compared with ECG of 04-SEP-2023 09:27,  Premature ventricular complexes are no longer Present  Borderline criteria for Anterior infarct are now Present  T wave inversion now evident in Inferior leads  Inverted T waves have replaced nonspecific T wave abnormality in Lateral leads  See ED provider note for full interpretation and clinical correlation  Confirmed by Adri Landon ([id number]) on 4/21/2025 10:55:04 AM            [1]   Family History  Problem Relation Name Age of Onset    Diabetes Mother     [2] pantoprazole, 40 mg, intravenous, Daily    [3] [4]

## 2025-04-21 NOTE — PROGRESS NOTES
Subjective   Patient ID: Brian Lennon is a 89 y.o. male who presents for Black or Bloody Stool (Bloody stools).  Rectal Bleeding  This is a new problem. The current episode started yesterday. The problem occurs constantly. The problem has been unchanged. Associated symptoms include weakness. Pertinent negatives include no abdominal pain, arthralgias, diaphoresis or numbness. The treatment provided no relief.       Past Medical History  Medical History[1]    Social History  Social History[2]    Family History   Family History[3]    Allergies:  RX Allergies[4]     Outpatient Medications:  Current Outpatient Medications   Medication Instructions    amiodarone (PACERONE) 200 mg, oral, Daily    aspirin (ECOTRIN LOW STRENGTH) 81 mg, Daily    atorvastatin (LIPITOR) 40 mg, oral, Nightly    carvedilol (COREG) 12.5 mg, oral, 2 times daily    cholecalciferol (VITAMIN D3) 25 mcg, oral, Daily    hydroCHLOROthiazide (MICROZIDE) 12.5 mg, oral, Daily    losartan (COZAAR) 50 mg, oral, Daily    magnesium oxide 400 mg, oral, Daily    meclizine (Antivert) 12.5 mg tablet 1 tablet, 3 times daily PRN    mirabegron (MYRBETRIQ) 50 mg, oral, Daily    nitroglycerin (NITROSTAT) 0.4 mg, Every 5 min PRN    propylene glycoL 0.6 % drops 2 drops, 2 times daily    vit A/vit C/vit E/zinc/copper (PRESERVISION AREDS ORAL) 1 tablet, Every 12 hours    white petrolatum-mineral oiL 94-3 % ophthalmic ointment 1 Application, Nightly        Review of Systems   Constitutional: Negative.  Negative for diaphoresis.   Respiratory: Negative.     Cardiovascular: Negative.    Gastrointestinal:  Positive for blood in stool and hematochezia. Negative for abdominal pain.   Musculoskeletal:  Negative for arthralgias.   Neurological:  Positive for weakness. Negative for numbness.         Objective       Physical Exam  Vitals reviewed.   Constitutional:       Appearance: Normal appearance. He is normal weight.   HENT:      Head: Normocephalic.   Eyes:       "Conjunctiva/sclera: Conjunctivae normal.   Cardiovascular:      Rate and Rhythm: Normal rate and regular rhythm.      Pulses: Normal pulses.   Pulmonary:      Breath sounds: Normal breath sounds.   Abdominal:      Palpations: Abdomen is soft.   Genitourinary:     Rectum: Guaiac result positive.      Comments: Blood on glovedfinger  Musculoskeletal:         General: Normal range of motion.      Cervical back: Neck supple.   Skin:     General: Skin is warm and dry.     /80 (BP Location: Right arm, Patient Position: Sitting, BP Cuff Size: Adult)   Pulse 86   Temp 35.5 °C (95.9 °F) (Temporal)   Ht 1.753 m (5' 9\")   Wt 77.1 kg (170 lb)   BMI 25.10 kg/m²      Assessment/Plan   Problem List Items Addressed This Visit       Asbestosis (Multi)     Other Visit Diagnoses         Rectal bleeding    -  Primary        Walk to our office that he has a rectal bleeding, he was examined by me, there was blood on my gloved finger, it is consistent with lower GI bleeding, abdomen was soft, there is no cramping, ischemic colitis is less likely, diverticular bleeding could be likely, as patient was bleeding actively he was referred to emergency room although hemodynamically he was not altered, he was accompanied by his sister so I told sister to take him to emergency room.  Patient's care has been provided here on a longitudinal basis for example today he relied on nurse for management of acute problem although could not be managed because of the nature and severity, this is a focal point for all kind of care whether it is acute or chronic in collaboration of care has been done by me on a several years basis, his asbestosis has been on a surveillance.       [1] History reviewed. No pertinent past medical history.  [2]   Social History  Tobacco Use    Smoking status: Former     Current packs/day: 0.00     Types: Cigarettes     Quit date:      Years since quittin.3    Smokeless tobacco: Never   Vaping Use    Vaping " status: Never Used   Substance Use Topics    Alcohol use: Not Currently    Drug use: Never   [3]   Family History  Problem Relation Name Age of Onset    Diabetes Mother     [4] No Known Allergies

## 2025-04-21 NOTE — ED PROCEDURE NOTE
Procedure  Critical Care    Performed by: Andreas SHAW MD  Authorized by: Andreas SHAW MD    Critical care provider statement:     Critical care time (minutes):  32    Critical care time was exclusive of:  Separately billable procedures and treating other patients    Critical care was necessary to treat or prevent imminent or life-threatening deterioration of the following conditions: GI bleed with blood loss anemia.    Critical care was time spent personally by me on the following activities:  Blood draw for specimens, development of treatment plan with patient or surrogate, discussions with consultants, discussions with primary provider, evaluation of patient's response to treatment, examination of patient, obtaining history from patient or surrogate, ordering and performing treatments and interventions, ordering and review of laboratory studies, pulse oximetry and re-evaluation of patient's condition    Care discussed with: admitting provider                 Andreas SHAW MD  04/21/25 8684

## 2025-04-21 NOTE — CARE PLAN
Care Management Follow Up    Length of Stay (days): 1    Expected Discharge Date: 10/08/2023     Concerns to be Addressed:  All concerns discussed in this encounter.     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   Chart reviewed. SW consult placed for discharge planning.    Chart reviewed. PT report to SW pt has weakness to her left arm and side, but pt was able to ambulate independently after assessment. Pt lives with her spouse. Pt's daughter share that at some point pt and her spouse will need [facility] to meet their cares. Pt would benefit from a few hours a day of PCA services. Pt and daughter express wanting more information regarding PCA services.     1155  SW met with pt and her daughter, Irene at bedside. Upon arrival pt's provider had just left pt's room. SW introduced self and asked to complete care management assessment/SW consult. Pt's daughter looked confused and express they do not have any SW needs and pt is to have surgery today. SW mention PT reporting pt would like more information regarding PCA services. Irene acknowledge and asked how the family go about setting up PCA services for pt. SW shared pt or family  The patient's goals for the shift include Patient stable H&H on this admission; no GI bleeding.    The clinical goals for the shift include Patient stable H&H on this admission; no GI bleeding.    Over the shift, the patient did make progress toward the following goals.      can contact the county pt lives in. They would check pt's insurance and if she eligible a Public Health Nurse will come out to the home to complete PCA assessment to determine eligibility hours. If pt needs more immediate PCA services she can private pay family or a staff from an agency. [facility] suggest Irene can look up [place] and search [place] agencies from there. Irene thanked [facility] for the information and reported that is all she needed from [facility]. [facility] informed pt and her daughter that SW is available until 8 pm should pt have any SW needs to have bedside nurse page [facility]. [facility] left pt bedside.     ARELI was not able to complete care management assessment.  available and will continue to follow for discharge planning and supports as needed.     _______________________    ANSHU Haro, LSW  ED/OBS   [facility]  Phone: [phone]  Pager: [phone]  Fax: [phone]     On-call pager, [phone], 4:00 pm to midnight

## 2025-04-22 ENCOUNTER — APPOINTMENT (OUTPATIENT)
Dept: CARDIOLOGY | Facility: CLINIC | Age: 89
End: 2025-04-22
Payer: MEDICARE

## 2025-04-22 ENCOUNTER — ANESTHESIA (OUTPATIENT)
Dept: GASTROENTEROLOGY | Facility: HOSPITAL | Age: 89
DRG: 378 | End: 2025-04-22
Payer: MEDICARE

## 2025-04-22 ENCOUNTER — ANESTHESIA EVENT (OUTPATIENT)
Dept: GASTROENTEROLOGY | Facility: HOSPITAL | Age: 89
DRG: 378 | End: 2025-04-22
Payer: MEDICARE

## 2025-04-22 ENCOUNTER — APPOINTMENT (OUTPATIENT)
Dept: GASTROENTEROLOGY | Facility: HOSPITAL | Age: 89
DRG: 378 | End: 2025-04-22
Payer: MEDICARE

## 2025-04-22 PROBLEM — D64.9 ANEMIA: Status: ACTIVE | Noted: 2025-04-22

## 2025-04-22 PROBLEM — N18.9 CHRONIC RENAL INSUFFICIENCY: Status: ACTIVE | Noted: 2025-04-22

## 2025-04-22 PROBLEM — K92.2 GASTROINTESTINAL HEMORRHAGE, UNSPECIFIED GASTROINTESTINAL HEMORRHAGE TYPE: Status: ACTIVE | Noted: 2025-04-22

## 2025-04-22 PROBLEM — Z53.1 TRANSFUSION OF BLOOD PRODUCT REFUSED FOR RELIGIOUS REASON: Status: ACTIVE | Noted: 2025-04-22

## 2025-04-22 PROBLEM — I38 VALVULAR HEART DISEASE: Status: ACTIVE | Noted: 2025-04-22

## 2025-04-22 PROBLEM — N18.6 ESRD (END STAGE RENAL DISEASE) (MULTI): Status: ACTIVE | Noted: 2025-04-22

## 2025-04-22 PROBLEM — Z92.89 HISTORY OF BLOOD TRANSFUSION: Status: ACTIVE | Noted: 2025-04-22

## 2025-04-22 PROBLEM — N17.9 ACUTE KIDNEY INJURY (NONTRAUMATIC) (CMS-HCC): Status: ACTIVE | Noted: 2025-04-22

## 2025-04-22 LAB
ANION GAP SERPL CALC-SCNC: 10 MMOL/L (ref 10–20)
BLOOD EXPIRATION DATE: NORMAL
BUN SERPL-MCNC: 41 MG/DL (ref 6–23)
CALCIUM SERPL-MCNC: 8.2 MG/DL (ref 8.6–10.3)
CHLORIDE SERPL-SCNC: 106 MMOL/L (ref 98–107)
CO2 SERPL-SCNC: 25 MMOL/L (ref 21–32)
CREAT SERPL-MCNC: 2.22 MG/DL (ref 0.5–1.3)
DISPENSE STATUS: NORMAL
EGFRCR SERPLBLD CKD-EPI 2021: 28 ML/MIN/1.73M*2
ERYTHROCYTE [DISTWIDTH] IN BLOOD BY AUTOMATED COUNT: 14.5 % (ref 11.5–14.5)
GLUCOSE SERPL-MCNC: 80 MG/DL (ref 74–99)
HCT VFR BLD AUTO: 26.2 % (ref 41–52)
HGB BLD-MCNC: 8.5 G/DL (ref 13.5–17.5)
MAGNESIUM SERPL-MCNC: 2.3 MG/DL (ref 1.6–2.4)
MCH RBC QN AUTO: 30.2 PG (ref 26–34)
MCHC RBC AUTO-ENTMCNC: 32.4 G/DL (ref 32–36)
MCV RBC AUTO: 93 FL (ref 80–100)
NRBC BLD-RTO: 0 /100 WBCS (ref 0–0)
PLATELET # BLD AUTO: 184 X10*3/UL (ref 150–450)
POTASSIUM SERPL-SCNC: 4.9 MMOL/L (ref 3.5–5.3)
PRODUCT BLOOD TYPE: 5100
PRODUCT CODE: NORMAL
RBC # BLD AUTO: 2.81 X10*6/UL (ref 4.5–5.9)
SODIUM SERPL-SCNC: 136 MMOL/L (ref 136–145)
UNIT ABO: NORMAL
UNIT NUMBER: NORMAL
UNIT RH: NORMAL
UNIT VOLUME: 350
WBC # BLD AUTO: 7.4 X10*3/UL (ref 4.4–11.3)
XM INTEP: NORMAL

## 2025-04-22 PROCEDURE — 85027 COMPLETE CBC AUTOMATED: CPT | Performed by: HOSPITALIST

## 2025-04-22 PROCEDURE — 1200000002 HC GENERAL ROOM WITH TELEMETRY DAILY

## 2025-04-22 PROCEDURE — 2500000004 HC RX 250 GENERAL PHARMACY W/ HCPCS (ALT 636 FOR OP/ED): Mod: JZ | Performed by: HOSPITALIST

## 2025-04-22 PROCEDURE — 2500000001 HC RX 250 WO HCPCS SELF ADMINISTERED DRUGS (ALT 637 FOR MEDICARE OP): Performed by: INTERNAL MEDICINE

## 2025-04-22 PROCEDURE — 2500000004 HC RX 250 GENERAL PHARMACY W/ HCPCS (ALT 636 FOR OP/ED): Mod: JZ | Performed by: REGISTERED NURSE

## 2025-04-22 PROCEDURE — 2500000004 HC RX 250 GENERAL PHARMACY W/ HCPCS (ALT 636 FOR OP/ED): Performed by: HOSPITALIST

## 2025-04-22 PROCEDURE — 99222 1ST HOSP IP/OBS MODERATE 55: CPT | Performed by: NURSE PRACTITIONER

## 2025-04-22 PROCEDURE — 0DJD8ZZ INSPECTION OF LOWER INTESTINAL TRACT, VIA NATURAL OR ARTIFICIAL OPENING ENDOSCOPIC: ICD-10-PCS | Performed by: INTERNAL MEDICINE

## 2025-04-22 PROCEDURE — 2500000001 HC RX 250 WO HCPCS SELF ADMINISTERED DRUGS (ALT 637 FOR MEDICARE OP): Performed by: HOSPITALIST

## 2025-04-22 PROCEDURE — 2500000005 HC RX 250 GENERAL PHARMACY W/O HCPCS: Performed by: REGISTERED NURSE

## 2025-04-22 PROCEDURE — 83735 ASSAY OF MAGNESIUM: CPT | Performed by: HOSPITALIST

## 2025-04-22 PROCEDURE — 36415 COLL VENOUS BLD VENIPUNCTURE: CPT | Performed by: HOSPITALIST

## 2025-04-22 PROCEDURE — 80048 BASIC METABOLIC PNL TOTAL CA: CPT | Performed by: HOSPITALIST

## 2025-04-22 PROCEDURE — 3700000001 HC GENERAL ANESTHESIA TIME - INITIAL BASE CHARGE

## 2025-04-22 PROCEDURE — 2500000002 HC RX 250 W HCPCS SELF ADMINISTERED DRUGS (ALT 637 FOR MEDICARE OP, ALT 636 FOR OP/ED): Performed by: HOSPITALIST

## 2025-04-22 PROCEDURE — 3700000002 HC GENERAL ANESTHESIA TIME - EACH INCREMENTAL 1 MINUTE

## 2025-04-22 PROCEDURE — 45378 DIAGNOSTIC COLONOSCOPY: CPT | Performed by: INTERNAL MEDICINE

## 2025-04-22 PROCEDURE — 99232 SBSQ HOSP IP/OBS MODERATE 35: CPT | Performed by: HOSPITALIST

## 2025-04-22 RX ORDER — ETOMIDATE 2 MG/ML
INJECTION INTRAVENOUS AS NEEDED
Status: DISCONTINUED | OUTPATIENT
Start: 2025-04-22 | End: 2025-04-22

## 2025-04-22 RX ORDER — PROPOFOL 10 MG/ML
INJECTION, EMULSION INTRAVENOUS AS NEEDED
Status: DISCONTINUED | OUTPATIENT
Start: 2025-04-22 | End: 2025-04-22

## 2025-04-22 RX ORDER — DEXTROMETHORPHAN/PSEUDOEPHED 2.5-7.5/.8
DROPS ORAL AS NEEDED
Status: DISCONTINUED | OUTPATIENT
Start: 2025-04-22 | End: 2025-04-22 | Stop reason: HOSPADM

## 2025-04-22 RX ORDER — SODIUM CHLORIDE, SODIUM LACTATE, POTASSIUM CHLORIDE, CALCIUM CHLORIDE 600; 310; 30; 20 MG/100ML; MG/100ML; MG/100ML; MG/100ML
INJECTION, SOLUTION INTRAVENOUS CONTINUOUS PRN
Status: DISCONTINUED | OUTPATIENT
Start: 2025-04-22 | End: 2025-04-22

## 2025-04-22 RX ADMIN — ETOMIDATE 2 MG: 40 INJECTION, SOLUTION INTRAVENOUS at 13:23

## 2025-04-22 RX ADMIN — PROPOFOL 20 MG: 10 INJECTION, EMULSION INTRAVENOUS at 13:22

## 2025-04-22 RX ADMIN — PANTOPRAZOLE SODIUM 40 MG: 40 INJECTION, POWDER, FOR SOLUTION INTRAVENOUS at 08:47

## 2025-04-22 RX ADMIN — ETOMIDATE 6 MG: 40 INJECTION, SOLUTION INTRAVENOUS at 13:20

## 2025-04-22 RX ADMIN — ATORVASTATIN CALCIUM 40 MG: 20 TABLET, FILM COATED ORAL at 20:44

## 2025-04-22 RX ADMIN — SIMETHICONE 40 MG: 20 EMULSION ORAL at 13:32

## 2025-04-22 RX ADMIN — PROPOFOL 40 MCG/KG/MIN: 10 INJECTION, EMULSION INTRAVENOUS at 13:23

## 2025-04-22 RX ADMIN — AMIODARONE HYDROCHLORIDE 200 MG: 200 TABLET ORAL at 08:48

## 2025-04-22 RX ADMIN — ETOMIDATE 2 MG: 40 INJECTION, SOLUTION INTRAVENOUS at 13:33

## 2025-04-22 RX ADMIN — ETOMIDATE 2 MG: 40 INJECTION, SOLUTION INTRAVENOUS at 13:29

## 2025-04-22 RX ADMIN — SODIUM CHLORIDE, SODIUM LACTATE, POTASSIUM CHLORIDE, AND CALCIUM CHLORIDE: .6; .31; .03; .02 INJECTION, SOLUTION INTRAVENOUS at 13:16

## 2025-04-22 RX ADMIN — MAGNESIUM OXIDE 400 MG (241.3 MG MAGNESIUM) TABLET 200 MG: TABLET at 08:47

## 2025-04-22 RX ADMIN — CARVEDILOL 12.5 MG: 12.5 TABLET, FILM COATED ORAL at 20:44

## 2025-04-22 RX ADMIN — ETOMIDATE 2 MG: 40 INJECTION, SOLUTION INTRAVENOUS at 13:40

## 2025-04-22 RX ADMIN — LOSARTAN POTASSIUM 50 MG: 50 TABLET, FILM COATED ORAL at 08:47

## 2025-04-22 RX ADMIN — CARVEDILOL 12.5 MG: 12.5 TABLET, FILM COATED ORAL at 08:47

## 2025-04-22 SDOH — HEALTH STABILITY: MENTAL HEALTH: CURRENT SMOKER: 0

## 2025-04-22 ASSESSMENT — COGNITIVE AND FUNCTIONAL STATUS - GENERAL
DAILY ACTIVITIY SCORE: 24
CLIMB 3 TO 5 STEPS WITH RAILING: A LITTLE
MOVING TO AND FROM BED TO CHAIR: A LITTLE
MOVING TO AND FROM BED TO CHAIR: A LITTLE
STANDING UP FROM CHAIR USING ARMS: A LITTLE
CLIMB 3 TO 5 STEPS WITH RAILING: A LITTLE
WALKING IN HOSPITAL ROOM: A LITTLE
MOBILITY SCORE: 20
DAILY ACTIVITIY SCORE: 24
WALKING IN HOSPITAL ROOM: A LITTLE
STANDING UP FROM CHAIR USING ARMS: A LITTLE
MOBILITY SCORE: 20

## 2025-04-22 ASSESSMENT — ACTIVITIES OF DAILY LIVING (ADL): LACK_OF_TRANSPORTATION: NO

## 2025-04-22 ASSESSMENT — PAIN - FUNCTIONAL ASSESSMENT: PAIN_FUNCTIONAL_ASSESSMENT: 0-10

## 2025-04-22 ASSESSMENT — PAIN SCALES - GENERAL
PAIN_LEVEL: 0
PAINLEVEL_OUTOF10: 0 - NO PAIN

## 2025-04-22 NOTE — ANESTHESIA POSTPROCEDURE EVALUATION
Patient: Brian Lennon    Procedure Summary       Date: 04/22/25 Room / Location: [facility]    Anesthesia Start: 1316 Anesthesia Stop:     Procedure: COLONOSCOPY Diagnosis:       Gastrointestinal hemorrhage, unspecified gastrointestinal hemorrhage type      Blood loss anemia    Scheduled Providers: Agustin Rizvi MD; Sonja Hanks MD Responsible Provider: Sonja Hanks MD    Anesthesia Type: MAC ASA Status: 3 - Emergent            Anesthesia Type: MAC    Vitals Value Taken Time   /59 04/22/25 13:44   Temp 36.5 04/22/25 13:44   Pulse 62 04/22/25 13:44   Resp 16 04/22/25 13:44   SpO2 97 04/22/25 13:44       Anesthesia Post Evaluation    Patient location during evaluation: bedside  Patient participation: complete - patient participated  Level of consciousness: awake and alert  Pain score: 0  Pain management: adequate  Airway patency: patent  Cardiovascular status: acceptable and stable  Respiratory status: acceptable and room air  Hydration status: acceptable  Postoperative Nausea and Vomiting: none        There were no known notable events for this encounter.

## 2025-04-22 NOTE — PROGRESS NOTES
04/22/25 1023   Discharge Planning   Living Arrangements Spouse/significant other   Support Systems Spouse/significant other;Family members   Assistance Needed none, PTA independent ADLS and IADLS no AD, drives, no falls last 3 months- states 1 fall August 2024 and back has bothered him since.   Type of Residence Private residence  (1 level home)   Number of Stairs to Enter Residence 1   Number of Stairs Within Residence 0   Do you have animals or pets at home? No   Home or Post Acute Services None   Expected Discharge Disposition Home   Does the patient need discharge transport arranged? No   Financial Resource Strain   How hard is it for you to pay for the very basics like food, housing, medical care, and heating? Not hard   Housing Stability   In the last 12 months, was there a time when you were not able to pay the mortgage or rent on time? N   In the past 12 months, how many times have you moved where you were living? 0   At any time in the past 12 months, were you homeless or living in a shelter (including now)? N   Transportation Needs   In the past 12 months, has lack of transportation kept you from medical appointments or from getting medications? no   In the past 12 months, has lack of transportation kept you from meetings, work, or from getting things needed for daily living? No   Stroke Family Assessment   Stroke Family Assessment Needed No   Intensity of Service   Intensity of Service 0-30 min     Pt currently in Observation status with DX GI bleed, GI on consult, Pt Hemoglobin on admission 7.2, received 1 unit PRBC last night and Hemoglobin 8.5 today, pt to have EGD today. Pt from home with his wife who he states recently had brain operation and he has around the clock help with her currently. Pt independent without DME, drives. Pt PCP is Dr. Rodriguez, [facility] in [place]. Pt denies barriers to appointments or medications. Pt discharge preference is home. No DC needs identified at  present time.

## 2025-04-22 NOTE — ANESTHESIA PREPROCEDURE EVALUATION
Patient: Brian Lennon    Procedure Information       Date/Time: 04/22/25 1300    Scheduled providers: Agustin Rizvi MD; Sonja Hanks MD    Procedure: COLONOSCOPY    Location: [facility]            Relevant Problems   Anesthesia (within normal limits)      Cardiac  EKG: Normal sinus rhythm  Possible Anterior infarct , age undetermined  Abnormal ECG  When compared with ECG of 04-SEP-2023 09:27,  Premature ventricular complexes are no longer Present  Borderline criteria for Anterior infarct are now Present  T wave inversion now evident in Inferior leads  Inverted T waves have replaced nonspecific T wave abnormality in Lateral leads      Echo:  Left ventricular systolic function is normal.   2. Spectral Doppler shows an impaired relaxation pattern of left ventricular diastolic filling.   3. Moderately enlarged right ventricle.   4. There is mildly reduced right ventricular systolic function.   5. Small mobile echodensity noted on the atrial side of the anterior leaflet of the mitral valve. Possible artifact but cannot exclude a vegetation. Consider LIAN to better characterize.   6. Trivial to 1+ tricuspid regurgitation.   7. Comparison study dated 2/28/2023 showed an estimated LV ejection fraction 30%. Mild mitral and tricuspid regurgitation. Estimated RVSP 41 mmHg.     (+) Benign essential hypertension   (+) Chronic combined systolic and diastolic congestive heart failure   (+) Mixed hyperlipidemia   (+) PVC (premature ventricular contraction)   (+) Valvular heart disease      Pulmonary   (+) GERARDO (obstructive sleep apnea)      GI   (+) GI bleed      /Renal   (+) Chronic renal insufficiency   (+) Malignant neoplasm of prostate (Multi)      Hematology   (+) Anemia   (+) History of blood transfusion   (+) Lymphoma, unspecified body region, unspecified lymphoma type (Multi)   (+) MALT (mucosa associated lymphoid tissue)      Musculoskeletal   (+) Lumbar foraminal stenosis      Circulatory   (+)  Arteriosclerotic cardiovascular disease      Genitourinary   (+) Chronic renal disease, stage IV (Multi)      Immune   (+) MGUS (monoclonal gammopathy of unknown significance)       Clinical information reviewed:                   NPO Detail:  No data recorded     Physical Exam    Airway  Mallampati: II  TM distance: >3 FB  Neck ROM: full  Mouth opening: 3 or more finger widths     Cardiovascular - normal exam  Rhythm: regular  Rate: normal     Dental    Pulmonary - normal exam   Abdominal - normal exam           Anesthesia Plan    History of general anesthesia?: yes  History of complications of general anesthesia?: no    ASA 3 - emergent     MAC     The patient is not a current smoker.    intravenous induction   Anesthetic plan and risks discussed with patient.  Use of blood products discussed with patient who.    Plan discussed with CRNA.

## 2025-04-22 NOTE — CARE PLAN
The patient's goals for the shift include Patient stable H&H on this admission; no GI bleeding.    The clinical goals for the shift include h/h stable throughout shift

## 2025-04-22 NOTE — PROGRESS NOTES
"PROGRESS NOTE    ASSESSMENT AND PLAN:     # Diverticular bleed  # Acute blood loss anemia  -Presented to the emergency room with worsening fatigue, bright blood per rectum.  - Hemoglobin on admission 7.2, baseline 11  - Underwent a colonoscopy which demonstrated diverticular bleed.  -Continue to monitor H&H    # CKD stage IV  -Around baseline      # History of coronary artery disease status post CABG  # History of nonischemic cardiomyopathy  # PVC  -History of nonischemic cardiomyopathy with EF of 30%, overall improved with most recent echocardiogram showed EF  -Hold aspirin        VTE Prophylaxis: SCD      SUBJECTIVE:   Admit Date: 4/21/2025    Interval History: Still admits to being fatigued, denies of shortness of breath.  He states that his bowel movements have improved.      OBJECTIVE:   Vitals: /67   Pulse 64   Temp 37.2 °C (99 °F) (Temporal)   Resp 16   Ht 1.753 m (5' 9\")   Wt 75 kg (165 lb 5.5 oz)   SpO2 100%   BMI 24.42 kg/m²    Wt Readings from Last 3 Encounters:   04/21/25 75 kg (165 lb 5.5 oz)   04/21/25 77.1 kg (170 lb)   01/21/25 78.9 kg (174 lb)      24HR INTAKE/OUTPUT:    Intake/Output Summary (Last 24 hours) at 4/22/2025 1609  Last data filed at 4/22/2025 1344  Gross per 24 hour   Intake 500 ml   Output 325 ml   Net 175 ml       PHYSICAL EXAM:   GENERAL: Laying in bed, does not appear to be in any distress.   HEENT: HEAD: Normocephalic atraumatic.  Neck: Supple.  Eyes: Pupils are reactive to direct light.   CVS: S1, S2 heard. Regular rate and rhythm  LUNGS: Clear to auscultate bilaterally. No wheezing or rhonchi appreciated.  ABDOMEN: Soft, nontender to palpate. Positive bowel sounds. No guarding or rebound appreciated.  NEUROLOGICAL: No focal neurological deficits appreciated. Cranial nerves are grossly intact.  EXTREMITIES: No edema appreciated.  SKIN:  Grossly intact, warm and dry.      LABS/IMAGING AND MEDICATIONS:   Scheduled Meds:Scheduled Medications[1]  PRN Meds:PRN " "Medications[2]    No lab exists for component: \"CBC\"   No lab exists for component: \"CMP\"   No lab exists for component: \"TROPONIN\"      Results from last 7 days   Lab Units 04/21/25  1119   INR  1.0     No lab exists for component: \"LIPIDS\"       No lab exists for component: \"URINALYSIS\"          BMP:  Results from last 7 days   Lab Units 04/22/25  0706 04/21/25  1119   SODIUM mmol/L 136 137   POTASSIUM mmol/L 4.9 5.3   CHLORIDE mmol/L 106 107   CO2 mmol/L 25 25   BUN mg/dL 41* 57*   CREATININE mg/dL 2.22* 2.79*       CBC:  Results from last 7 days   Lab Units 04/22/25  0706 04/21/25  1834 04/21/25  1119   WBC AUTO x10*3/uL 7.4  --  7.0   RBC AUTO x10*6/uL 2.81*  --  2.39*   HEMOGLOBIN g/dL 8.5* 6.9* 7.2*   HEMATOCRIT % 26.2* 21.5* 22.6*   MCV fL 93  --  95   MCH pg 30.2  --  30.1   MCHC g/dL 32.4  --  31.9*   RDW % 14.5  --  13.9   PLATELETS AUTO x10*3/uL 184  --  193       Cardiac Enzymes:   Results from last 7 days   Lab Units 04/21/25  1119   TROPHS ng/L 52*         Hepatic Function Panel:  Results from last 7 days   Lab Units 04/21/25  1119   ALK PHOS U/L 47   ALT U/L 16   AST U/L 19   PROTEIN TOTAL g/dL 6.7   BILIRUBIN TOTAL mg/dL 0.3   BILIRUBIN DIRECT mg/dL 0.1       Magnesium:  Results from last 7 days   Lab Units 04/22/25  0706   MAGNESIUM mg/dL 2.30       Pro-BNP:  No results found for: \"PROBNP\"    INR:  Results from last 7 days   Lab Units 04/21/25  1119   PROTIME seconds 10.5   INR  1.0           CMP:  Results from last 7 days   Lab Units 04/22/25  0706 04/21/25  1119   SODIUM mmol/L 136 137   POTASSIUM mmol/L 4.9 5.3   CHLORIDE mmol/L 106 107   CO2 mmol/L 25 25   BUN mg/dL 41* 57*   CREATININE mg/dL 2.22* 2.79*   GLUCOSE mg/dL 80 97   CALCIUM mg/dL 8.2* 8.3*   PROTEIN TOTAL g/dL  --  6.7   BILIRUBIN TOTAL mg/dL  --  0.3   ALK PHOS U/L  --  47   AST U/L  --  19   ALT U/L  --  16                   [1] amiodarone, 200 mg, oral, Daily  atorvastatin, 40 mg, oral, Nightly  carvedilol, 12.5 mg, oral, " BID  losartan, 50 mg, oral, Daily  magnesium oxide, 200 mg, oral, Daily  pantoprazole, 40 mg, intravenous, Daily  [2]    The patient is a 10y7m Male complaining of fever.

## 2025-04-22 NOTE — PROGRESS NOTES
Physical Therapy                 Therapy Communication Note    Patient Name: Brian Lennon  MRN: [id number]  Department: [place] [address]  Room: [address] [place] [address]  Today's Date: 4/22/2025     Discipline: Physical Therapy    PT Missed Visit: Yes     Missed Visit Reason: Missed Visit Reason: Patient in a medical procedure (Pt off floor for colonoscopy. Will reattempt PT eval when able.)    Missed Time: Attempt 1315       Yes

## 2025-04-22 NOTE — CONSULTS
Department of Internal Medicine  Gastroenterology  Consult note      Reason for Consult: GIB    Chief Complaint: generalized weakness and bright blood per rectum     History Obtained from: patient and prior medical records    History of Present Illness      The patient is a 89 y.o. male with signficant past medical history of CKD stage IV, history of marginal lymphoma, coronary artery disease status post CABG, hypertension nonischemic cardiomyopathy with history of low EF, normalized after medications, history of nonsustained ventricular tachycardia, who presented to the emergency room with generalized weakness and bright blood per rectum.     The patient reports bright red blood per rectum occurring at least 3 times daily the past week described as large amount of blood filling up the toilet with clots.  Patient completed bowel prep for colonoscopy today.  Patient reports he was still having bloody bowel movements yesterday, but it turned clear last night post bowel prep with no further signs of bleeding.  Patient does report approximately 20 pound unintentional weight loss past month and some constipation.  Patient reports he did not take anything over-the-counter except prune juice.  Patient reports taking ASA at home, no other anticoagulation.  No regular NSAID use.  Patient denies melena and hematemesis.  Patient denies abdominal pain, nausea/vomiting, fever/chills, dizziness, shortness of breath or chest pain. No diarrhea.      Risks and benefits of colonoscopy discussed with the patient and daughter at bedside.  Patient and daughter verbalized understanding and agreeable to proceed.  All questions answered.    Allergies  Patient has no known allergies.    Current Medication  Current Medications[1]    Past Medical History  Active Ambulatory Problems     Diagnosis Date Noted    Arteriosclerotic cardiovascular disease 02/14/2023    Asbestosis (Multi) 02/14/2023    Benign essential hypertension 02/14/2023     "Malignant neoplasm of prostate (Multi) 02/14/2023    MALT (mucosa associated lymphoid tissue) 02/14/2023    MGUS (monoclonal gammopathy of unknown significance) 02/14/2023    Mixed hyperlipidemia 02/14/2023    Obstructive sleep apnea syndrome 02/14/2023    Lumbar foraminal stenosis 04/27/2023    Chronic combined systolic and diastolic congestive heart failure 01/16/2024    Former smoker 05/06/2024    PVC (premature ventricular contraction) 06/10/2024    Chronic renal disease, stage IV (Multi) 02/14/2023    Lymphoma, unspecified body region, unspecified lymphoma type (Multi) 10/17/2024     Resolved Ambulatory Problems     Diagnosis Date Noted    No Resolved Ambulatory Problems     No Additional Past Medical History       Past Surgical History  Surgical History[2]    Family History  Family History[3]  No family history of stomach or colon cancer    Social History  TOBACCO:  reports that he quit smoking about 11 years ago. His smoking use included cigarettes. He has never used smokeless tobacco.  ETOH:  reports that he does not currently use alcohol.  DRUGS:  reports no history of drug use.  MARITAL STATUS:   OCCUPATION:    Review of Systems  All 10 systems reviewed with the patient/family and negative except for what is mentioned in HPI      PHYSICAL EXAM  VS: /69   Pulse 70   Temp 36.2 °C (97.2 °F)   Resp 16   Ht 1.753 m (5' 9\")   Wt 75 kg (165 lb 5.5 oz)   SpO2 99%   BMI 24.42 kg/m²  Body mass index is 24.42 kg/m².  Physical Exam  Vitals reviewed.   Constitutional:       General: He is not in acute distress.     Appearance: Normal appearance.   HENT:      Head: Normocephalic.      Nose: Nose normal.      Mouth/Throat:      Mouth: Mucous membranes are moist.      Pharynx: Oropharynx is clear.   Eyes:      Extraocular Movements: Extraocular movements intact.      Conjunctiva/sclera: Conjunctivae normal.      Pupils: Pupils are equal, round, and reactive to light.   Cardiovascular:      Rate and Rhythm: " Normal rate and regular rhythm.      Pulses: Normal pulses.      Heart sounds: Normal heart sounds.   Pulmonary:      Effort: Pulmonary effort is normal.      Breath sounds: Normal breath sounds.   Abdominal:      General: Bowel sounds are normal. There is no distension.      Palpations: Abdomen is soft.      Tenderness: There is no abdominal tenderness. There is no guarding or rebound.      Comments: JOVANNY: no stool in rectal vault   Musculoskeletal:         General: Normal range of motion.      Cervical back: Normal range of motion.   Skin:     General: Skin is warm and dry.   Neurological:      General: No focal deficit present.      Mental Status: He is alert and oriented to person, place, and time.   Psychiatric:         Mood and Affect: Mood normal.         Behavior: Behavior normal.          DATA  Recent blood work and relevant radiology and endoscopic studies were reviewed and discussed with the patient   Results from last 7 days   Lab Units 04/22/25  0706   WBC AUTO x10*3/uL 7.4   RBC AUTO x10*6/uL 2.81*   HEMOGLOBIN g/dL 8.5*   HEMATOCRIT % 26.2*   MCV fL 93   MCHC g/dL 32.4   RDW % 14.5   PLATELETS AUTO x10*3/uL 184       Results from last 72 hours   Lab Units 04/22/25  0706 04/21/25  1119   SODIUM mmol/L 136 137   POTASSIUM mmol/L 4.9 5.3   CHLORIDE mmol/L 106 107   CO2 mmol/L 25 25   BUN mg/dL 41* 57*   CREATININE mg/dL 2.22* 2.79*   CALCIUM mg/dL 8.2* 8.3*   PROTEIN TOTAL g/dL  --  6.7   BILIRUBIN TOTAL mg/dL  --  0.3   ALK PHOS U/L  --  47   AST U/L  --  19   ALT U/L  --  16       Results from last 72 hours   Lab Units 04/21/25  1119   INR  1.0             RADIOLOGY REVIEW  No CT      ENDOSCOPIC REVIEW    No prior EGD    Colonoscopy 5/10/2017 with Dr. Dutton indicated for hx of colonic polyps with adequate prep initially used adult colonoscope followed thereafter by pediatric colonoscope advancing ultimately to the cecum:  -scattered diverticulosis in sigmoid colon  -diminutive sessile polyp removed in  sigmoid colon  -grade 2 non-inflamed IH        IMPRESSION/RECOMMENDATIONS  The patient is a 89 y.o. male with signficant past medical history of CKD stage IV, history of marginal lymphoma, coronary artery disease status post CABG - only on ASA at home, hypertension nonischemic cardiomyopathy with history of low EF, normalized after medications, history of nonsustained ventricular tachycardia, who presented to the emergency room with generalized weakness and bright blood per rectum.       #GIB - painless hematochezia - seems to have resolved. Consider diverticular bleed +/- hemorrhoids in the setting of constipation.  Also rule out CRC with reported weight loss.  Colonoscopy in 2017 noted diverticulosis and IH  #Acute normocytic anemia likely 2/2 blood loss - Hgb 11.1 (9/4/23) and dropped to 6.9 this admit, s/p 1uprbcs yest 4/21, incrementing to 8.5 today. HDS  # Unintentional weight loss- 20 pounds past month  # Constipation    Completed bowel prep yesterday and clear.       Plan  - Colonoscopy today  -trend hgb and hct  -monitor consistency and color of stool. Monitor for signs of overt GI bleeding  -tranfuse for hgb < 7.0  -avoid NSAIDs  -recommend bowel regimen                Plan discussed with Dr. Wong. GI will continue to follow  (Electronically signed byCHARLENE Erickson on 4/22/2025 at 8:23 AM)         Inpatient consult to gastroenterology  Consult performed by: CHARLENE Erickson  Consult ordered by: Robert Franco MD             [1]   Current Facility-Administered Medications:     amiodarone (Pacerone) tablet 200 mg, 200 mg, oral, Daily, Robert Franco MD, 200 mg at 04/21/25 1837    atorvastatin (Lipitor) tablet 40 mg, 40 mg, oral, Nightly, Robert Franco MD, 40 mg at 04/21/25 2115    carvedilol (Coreg) tablet 12.5 mg, 12.5 mg, oral, BID, Robert Franco MD, 12.5 mg at 04/21/25 2115    losartan (Cozaar) tablet 50 mg, 50 mg, oral,  Daily, Robert Franco MD, 50 mg at 04/21/25 1837    magnesium oxide (Mag-Ox) tablet 200 mg, 200 mg, oral, Daily, Robert Franco MD, 200 mg at 04/21/25 1837    pantoprazole (Protonix) injection 40 mg, 40 mg, intravenous, Daily, Robert Franco MD, 40 mg at 04/21/25 1318  [2]   Past Surgical History:  Procedure Laterality Date    OTHER SURGICAL HISTORY  05/08/2019    Cataract surgery    OTHER SURGICAL HISTORY  05/08/2019    Knee replacement    OTHER SURGICAL HISTORY  05/08/2019    Heart surgery    OTHER SURGICAL HISTORY  03/31/2022    Colonoscopy    OTHER SURGICAL HISTORY  03/31/2022    Eye surgery    OTHER SURGICAL HISTORY  03/31/2022    Trabeculectomy   [3]   Family History  Problem Relation Name Age of Onset    Diabetes Mother

## 2025-04-23 ENCOUNTER — APPOINTMENT (OUTPATIENT)
Dept: PRIMARY CARE | Facility: CLINIC | Age: 89
End: 2025-04-23
Payer: MEDICARE

## 2025-04-23 VITALS
DIASTOLIC BLOOD PRESSURE: 64 MMHG | HEART RATE: 72 BPM | SYSTOLIC BLOOD PRESSURE: 136 MMHG | TEMPERATURE: 98.1 F | OXYGEN SATURATION: 96 % | BODY MASS INDEX: 24.49 KG/M2 | WEIGHT: 165.34 LBS | RESPIRATION RATE: 16 BRPM | HEIGHT: 69 IN

## 2025-04-23 LAB
ERYTHROCYTE [DISTWIDTH] IN BLOOD BY AUTOMATED COUNT: 14.6 % (ref 11.5–14.5)
HCT VFR BLD AUTO: 25 % (ref 41–52)
HGB BLD-MCNC: 8.1 G/DL (ref 13.5–17.5)
HOLD SPECIMEN: NORMAL
HOLD SPECIMEN: NORMAL
MCH RBC QN AUTO: 29.8 PG (ref 26–34)
MCHC RBC AUTO-ENTMCNC: 32.4 G/DL (ref 32–36)
MCV RBC AUTO: 92 FL (ref 80–100)
NRBC BLD-RTO: 0 /100 WBCS (ref 0–0)
PLATELET # BLD AUTO: 180 X10*3/UL (ref 150–450)
RBC # BLD AUTO: 2.72 X10*6/UL (ref 4.5–5.9)
WBC # BLD AUTO: 7.7 X10*3/UL (ref 4.4–11.3)

## 2025-04-23 PROCEDURE — 99232 SBSQ HOSP IP/OBS MODERATE 35: CPT | Performed by: NURSE PRACTITIONER

## 2025-04-23 PROCEDURE — 99239 HOSP IP/OBS DSCHRG MGMT >30: CPT | Performed by: HOSPITALIST

## 2025-04-23 PROCEDURE — 85027 COMPLETE CBC AUTOMATED: CPT | Performed by: NURSE PRACTITIONER

## 2025-04-23 PROCEDURE — 2500000001 HC RX 250 WO HCPCS SELF ADMINISTERED DRUGS (ALT 637 FOR MEDICARE OP): Performed by: HOSPITALIST

## 2025-04-23 PROCEDURE — 97165 OT EVAL LOW COMPLEX 30 MIN: CPT | Mod: GO

## 2025-04-23 PROCEDURE — 2500000002 HC RX 250 W HCPCS SELF ADMINISTERED DRUGS (ALT 637 FOR MEDICARE OP, ALT 636 FOR OP/ED): Performed by: HOSPITALIST

## 2025-04-23 PROCEDURE — 2500000004 HC RX 250 GENERAL PHARMACY W/ HCPCS (ALT 636 FOR OP/ED): Performed by: HOSPITALIST

## 2025-04-23 PROCEDURE — 36415 COLL VENOUS BLD VENIPUNCTURE: CPT | Performed by: NURSE PRACTITIONER

## 2025-04-23 PROCEDURE — 97161 PT EVAL LOW COMPLEX 20 MIN: CPT | Mod: GP

## 2025-04-23 RX ORDER — FERROUS SULFATE 325(65) MG
325 TABLET ORAL 2 TIMES DAILY
Qty: 60 TABLET | Refills: 1 | Status: SHIPPED | OUTPATIENT
Start: 2025-04-23 | End: 2025-06-22

## 2025-04-23 RX ADMIN — PANTOPRAZOLE SODIUM 40 MG: 40 INJECTION, POWDER, FOR SOLUTION INTRAVENOUS at 08:15

## 2025-04-23 RX ADMIN — LOSARTAN POTASSIUM 50 MG: 50 TABLET, FILM COATED ORAL at 08:15

## 2025-04-23 RX ADMIN — CARVEDILOL 12.5 MG: 12.5 TABLET, FILM COATED ORAL at 08:15

## 2025-04-23 RX ADMIN — AMIODARONE HYDROCHLORIDE 200 MG: 200 TABLET ORAL at 08:15

## 2025-04-23 RX ADMIN — MAGNESIUM OXIDE 400 MG (241.3 MG MAGNESIUM) TABLET 200 MG: TABLET at 08:15

## 2025-04-23 ASSESSMENT — PAIN - FUNCTIONAL ASSESSMENT
PAIN_FUNCTIONAL_ASSESSMENT: 0-10

## 2025-04-23 ASSESSMENT — COGNITIVE AND FUNCTIONAL STATUS - GENERAL
STANDING UP FROM CHAIR USING ARMS: A LITTLE
MOVING TO AND FROM BED TO CHAIR: A LITTLE
TOILETING: A LITTLE
HELP NEEDED FOR BATHING: A LITTLE
MOVING FROM LYING ON BACK TO SITTING ON SIDE OF FLAT BED WITH BEDRAILS: A LITTLE
DAILY ACTIVITIY SCORE: 19
CLIMB 3 TO 5 STEPS WITH RAILING: A LOT
MOBILITY SCORE: 20
TURNING FROM BACK TO SIDE WHILE IN FLAT BAD: A LITTLE
MOBILITY SCORE: 17
DRESSING REGULAR LOWER BODY CLOTHING: A LITTLE
DAILY ACTIVITIY SCORE: 24
WALKING IN HOSPITAL ROOM: A LITTLE
CLIMB 3 TO 5 STEPS WITH RAILING: A LITTLE
DRESSING REGULAR UPPER BODY CLOTHING: A LITTLE
STANDING UP FROM CHAIR USING ARMS: A LITTLE
PERSONAL GROOMING: A LITTLE
MOVING TO AND FROM BED TO CHAIR: A LITTLE
WALKING IN HOSPITAL ROOM: A LITTLE

## 2025-04-23 ASSESSMENT — PAIN SCALES - GENERAL
PAINLEVEL_OUTOF10: 0 - NO PAIN

## 2025-04-23 ASSESSMENT — ACTIVITIES OF DAILY LIVING (ADL): BATHING_ASSISTANCE: MINIMAL

## 2025-04-23 NOTE — PROGRESS NOTES
04/23/25 1244   Discharge Planning   Living Arrangements Spouse/significant other   Support Systems Spouse/significant other;Family members   Assistance Needed PTA - lives with his wife (who he is the primary caregiver of after recent brain surgery) in a 1 story home, 1 PATRICIA but no HR. Has a walk in shower with seat and grab bars. States family stays 24/7 to assist with spouse's care and they have 24/7 nursing care that assists as well. At baseline - reports he ambulates independently with a walking stick or sometimes uses a wheelchair as a walker. Independent for all his ADLs. Family assists with all needed IADLs as his spouse previously did these. Drives.   Type of Residence Private residence   Number of Stairs to Enter Residence 1   Number of Stairs Within Residence 0   Do you have animals or pets at home? No   Home or Post Acute Services None   Expected Discharge Disposition Home   Does the patient need discharge transport arranged? No   Intensity of Service   Intensity of Service 0-30 min     PT/OT [facility] 17/19, recommending low intensity needs at dc. Pt prefers HOME at dc, declines C.

## 2025-04-23 NOTE — PROGRESS NOTES
Physical Therapy    Physical Therapy Evaluation    Patient Name: Brian Lennon  MRN: [id number]  Department: [facility]  Room: [address] [place] [address]  Today's Date: 4/23/2025   Time Calculation  Start Time: 1006  Stop Time: 1015  Time Calculation (min): 9 min    Assessment/Plan   PT Assessment  PT Assessment Results: Decreased strength, Decreased range of motion, Impaired balance, Decreased mobility  Rehab Prognosis: Good  Barriers to Discharge Home: Caregiver assistance, Physical needs  Evaluation/Treatment Tolerance: Patient limited by fatigue  End of Session Communication: Bedside nurse  Assessment Comment: Pt would benefit from continued therapy to improve strength, ROM, and functional mobility.  End of Session Patient Position: Up in chair, Alarm on (Call button within reach)  IP OR SWING BED PT PLAN  Inpatient or Swing Bed: Inpatient  PT Plan  Treatment/Interventions: Bed mobility, Transfer training, Gait training, Strengthening, Therapeutic exercise, Home exercise program  PT Plan: Ongoing PT  PT Frequency: 3 times per week  PT Discharge Recommendations: Low intensity level of continued care (With family assist)  PT Recommended Transfer Status: Assist x1, Assistive device  PT - OK to Discharge: Once medically appropriate    Subjective   General Visit Information:  General  Reason for Referral: Impaired mobility  Referred By: PT/OT 4/22 Noah  Past Medical History Relevant to Rehab: HTN, CKD, CABG, TKR, cataract surgery, CAD, tachycardia  Family/Caregiver Present: No  Co-Treatment: OT  Co-Treatment Reason: To maximize pt function and safety  Prior to Session Communication: Bedside nurse  Patient Position Received: Bed, 3 rail up, Alarm on  General Comment: Pt is an 89 y/o M who presented to ED 3/21 for rectal bleeding and weakness. Dx: GIB. Colonoscopy indicated diverticular bleed. 4/23 Hgb 8.1.  Home Living:  Home Living  Home Living Comments: Per pt, lives with wife in a one story home. 1 PATRICIA, no HR. Has walk in shower with  seat and grab bars. Wife fell and had brain surgery 1 month ago, pt has been primary caregiver since. Family stays 24/7 to assist since surgery, has 24/7 home health nurses to help wife.  Prior Level of Function:  Prior Function Per Pt/Caregiver Report  Prior Function Comments: Per pt, Ambulates Mod I with walking stick or uses wheelchair handles as walker. Independent with all ADLs, family assists with IADLs while wife recovers from surgery. Has had one fall in August, but none in the last 3 months. Pt does drive.  Precautions:  Precautions  Medical Precautions: Fall precautions     Objective   Pain:  Pain Assessment  Pain Assessment: 0-10  0-10 (Numeric) Pain Score: 0 - No pain  Cognition:  Cognition  Overall Cognitive Status: Within Functional Limits    General Assessments:  General Observation  General Observation: Pt supine in bed prior to session with HOB elevated to 40 deg. Pt educated on PT POC and consents to PT evaluation     Activity Tolerance  Endurance: Decreased tolerance for upright activites    Static Sitting Balance  Static Sitting-Comment/Number of Minutes: SBA + B UE/LE support  Dynamic Sitting Balance  Dynamic Sitting-Comments: SBA + B UE/LE support during strength assessment, retropulsion    Static Standing Balance  Static Standing-Comment/Number of Minutes: CGA + WW  Dynamic Standing Balance  Dynamic Standing-Comments: CGA + WW During ambulation  Functional Assessments:  Bed Mobility  Bed Mobility:  (Supine <> sit EOB: CGA with HOB elevated to 40 deg, Increased time and effort needed.)    Transfers  Transfer:  (Sit <> stand: CGA + WW, increased time and effort needed, cues given for proper hand placement)    Ambulation/Gait Training  Ambulation/Gait Training Performed:  (15ft, CGA + WW: Decreased heel strike, forward flexed posture, slow, shuffeling steps. Pt educated on using WW at home for increased safety.)  Extremity/Trunk Assessments:  ROM  RUE : Within Functional Limits  LUE: Within  Functional Limits  RLE : Within Functional Limits  LLE : Within Functional Limits    Strength  Strength Comments: B hip flexion strength: 5/5 B knee extension strength: 5/5 B DF strength: 5/5    Outcome Measures:  [facility] Basic Mobility  Turning from your back to your side while in a flat bed without using bedrails: A little  Moving from lying on your back to sitting on the side of a flat bed without using bedrails: A little  Moving to and from bed to chair (including a wheelchair): A little  Standing up from a chair using your arms (e.g. wheelchair or bedside chair): A little  To walk in hospital room: A little  Climbing 3-5 steps with railing: A lot  Basic Mobility - Total Score: 17    Encounter Problems       Encounter Problems (Active)       PT Problem       Pt will completed supine <> sit bed mobility from flat bed with Kerr (Progressing)       Start:  04/23/25    Expected End:  05/07/25            Pt will demonstrate sit to stand transfers with WW + Mod I  (Progressing)       Start:  04/23/25    Expected End:  05/07/25            Pt. will ambulate 50ft with WW + Mod I  (Progressing)       Start:  04/23/25    Expected End:  05/07/25            Pt will complete B LE strengthening HEP with independence (Not Progressing)       Start:  04/23/25    Expected End:  05/07/25               Pain - Adult                    Education Documentation  Mobility Training, taught by Anna Marie Brown, PT at 4/23/2025 11:45 AM.  Learner: Patient  Readiness: Acceptance  Method: Explanation  Response: Verbalizes Understanding, Demonstrated Understanding

## 2025-04-23 NOTE — PROGRESS NOTES
"Brian Lennon is a 89 y.o. male on day 1 of admission presenting with GI bleed.    Subjective   GI following for rectal bleeding. No Hgb level was ordered for today. Will order CBC now.   No further rectal bleeding. Reports fatigue but no CP, SOB, abdominal pain, N/V/D.    Objective   PHYSICAL EXAM:   GENERAL: Laying in bed, does not appear to be in any distress.   HEENT: HEAD: Normocephalic atraumatic.  Neck: Supple.  Eyes: Pupils are reactive to direct light.   CVS: S1, S2 heard. Regular rate and rhythm  LUNGS: Clear to auscultate bilaterally. No wheezing or rhonchi appreciated.  ABDOMEN: Soft, nontender to palpate. Positive bowel sounds. No guarding or rebound appreciated.  NEUROLOGICAL: No focal neurological deficits appreciated. Cranial nerves are grossly intact.  EXTREMITIES: No edema appreciated.  SKIN:  Grossly intact, warm and dry.     Last Recorded Vitals  Blood pressure 136/64, pulse 72, temperature 36.7 °C (98.1 °F), temperature source Temporal, resp. rate 16, height 1.753 m (5' 9\"), weight 75 kg (165 lb 5.5 oz), SpO2 96%.  Intake/Output last 3 Shifts:  I/O last 3 completed shifts:  In: 500 (6.7 mL/kg) [I.V.:150 (2 mL/kg); Blood:350]  Out: 325 (4.3 mL/kg) [Urine:325 (0.1 mL/kg/hr)]  Weight: 75 kg     Relevant Results  Lab Results   Component Value Date    WBC 7.4 04/22/2025    HGB 8.5 (L) 04/22/2025    HCT 26.2 (L) 04/22/2025    MCV 93 04/22/2025     04/22/2025     Lab Results   Component Value Date    ALT 16 04/21/2025    AST 19 04/21/2025    ALKPHOS 47 04/21/2025    BILITOT 0.3 04/21/2025     Lab Results   Component Value Date    GLUCOSE 80 04/22/2025    CALCIUM 8.2 (L) 04/22/2025     04/22/2025    K 4.9 04/22/2025    CO2 25 04/22/2025     04/22/2025    BUN 41 (H) 04/22/2025    CREATININE 2.22 (H) 04/22/2025     Lab Results   Component Value Date    INR 1.0 04/21/2025    INR 1.0 09/04/2023    INR 1.1 02/27/2023    PROTIME 10.5 04/21/2025    PROTIME 11.6 09/04/2023    PROTIME 12.6 " 02/27/2023     Imaging  === 04/27/23 ===    CT CHEST WO IV CONTRAST    - Impression -  1.  Interval resolution of previously visualized small bilateral  pleural effusions with similar appearance of reticular and  ground-glass opacities scattered throughout the bilateral lower  lobes, lingula and right middle lobe which are nonspecific but likely  reflect a combination of fibrotic changes and superimposed  atelectasis. However, correlate with concern for superimposed  infectious/inflammatory process.  2. Similar appearance of findings compatible with prior granulomatous  infection. No new acute abnormality within the chest.       Cardiology, Vascular, and Other Imaging  Colonoscopy Diagnostic  Result Date: 4/22/2025  Table formatting from the original result was not included. Impression Diverticulosis containing blood clots and stool in the sigmoid colon Small hemorrhoids The exam was otherwise normal on both forward and retroflexed views. Findings Multiple small and large diverticula containing blood clots and stool in the sigmoid colon; no bleeding was observed. There was a clot-filled diverticulum at 40 cm.  Also in the sigmoid, there was a large, mobile clot.  No active bleeding. External and internal small hemorrhoids observed during retroflexion; no bleeding was observed. No radiation proctitis. The exam was otherwise normal on both forward and retroflexed views.  Recommendation Follow up with PCP No further screening colonoscopies necessary Return to [facility] for ongoing care Stat CT angiogram if rebleeds  Indication Blood loss anemia, Gastrointestinal hemorrhage, unspecified gastrointestinal hemorrhage type Post-Op Diagnosis Diverticulosis of colon with hemorrhage Staff Staff Role Agustin Rizvi MD Proceduralist Medications See Anesthesia Record. Preprocedure A history and physical has been performed, and patient medication allergies have been reviewed. The patient's tolerance of previous anesthesia has been  reviewed. The risks and benefits of the procedure and the sedation options and risks were discussed with the patient. All questions were answered and informed consent obtained. Details of the Procedure The patient underwent monitored anesthesia care, which was administered by an anesthesia professional. The patient's blood pressure, ECG, ETCO2, heart rate, level of consciousness, oxygen and respirations were monitored throughout the procedure. A digital rectal exam was performed. The scope was introduced through the anus and advanced to the cecum. Retroflexion was performed in the rectum. The quality of bowel preparation was evaluated using the [place] Bowel Preparation Scale with scores of: right colon = 2, transverse colon = 2, left colon = 2. The total BBPS score was 6. Bowel prep was adequate. The patient experienced no blood loss. The procedure was moderately difficult due to tortuous colon. The patient tolerated the procedure well. There were no apparent adverse events. Last colonoscopy 2017. Events Procedure Events Event Event Time ENDO SCOPE IN TIME 4/22/2025  1:22 PM ENDO CECUM REACHED 4/22/2025  1:32 PM ENDO SCOPE OUT TIME 4/22/2025  1:42 PM Specimens No specimens collected Procedure Location [address] [address] [phone] Referring Provider Elvia Up, APRJAMISON-CNP Procedure Provider MD Elvia Flores     ECG 12 Lead  Result Date: 4/21/2025  Normal sinus rhythm Possible Anterior infarct , age undetermined Abnormal ECG When compared with ECG of 04-SEP-2023 09:27, Premature ventricular complexes are no longer Present Borderline criteria for Anterior infarct are now Present T wave inversion now evident in Inferior leads Inverted T waves have replaced nonspecific T wave abnormality in Lateral leads See ED provider note for full interpretation and clinical correlation Confirmed by Adri Landon ([id number]) on 4/21/2025 10:55:04  AM      Colonoscopy 5/10/2017 with Dr. Dutton indicated for hx of colonic polyps with adequate prep initially used adult colonoscope followed thereafter by pediatric colonoscope advancing ultimately to the cecum:  -scattered diverticulosis in sigmoid colon  -diminutive sessile polyp removed in sigmoid colon  -grade 2 non-inflamed IH         IMPRESSION/RECOMMENDATIONS  The patient is a 89 y.o. male with signficant past medical history of CKD stage IV, history of marginal lymphoma, coronary artery disease status post CABG - only on ASA at home, hypertension nonischemic cardiomyopathy with history of low EF, normalized after medications, history of nonsustained ventricular tachycardia, who presented to the emergency room with generalized weakness and bright blood per rectum.      4/22/25 Colonoscopy with Dr. Rizvi indicated for rectal bleeding   Impression  Diverticulosis containing blood clots and stool in the sigmoid colon  Small hemorrhoids  The exam was otherwise normal on both forward and retroflexed views    GIB - painless hematochezia - seems to have resolved. Most likely diverticular bleed +/- hemorrhoids in the setting of constipation.   Acute normocytic anemia likely 2/2 blood loss - Hgb 11.1 (9/4/23) and dropped to 6.9 this admit, s/p 1uprbcs. Hgb 8.5 4/22. HDS  Unintentional weight loss- 20 pounds past month  Constipation     Plan  -Stat CT angiogram if patient rebleeds  -Continue to trend H&H and transfuse pRBCs if Hgb <7-8.0  -Continue to monitor consistency and color of stool. Monitor for signs of overt GI bleeding  -Avoid NSAIDs  -Recommend bowel regimen with fiber supplement BID and Miralax 1-2 doses daily.       Plan discussed with Dr. Wong.   I spent 25 minutes in the professional and overall care of this patient.      Colette Gil, ROXANNE-CNP

## 2025-04-23 NOTE — DISCHARGE SUMMARY
Discharge Diagnosis  GI bleed due to Diverticulosis           Issues Requiring Follow-Up  CBC in 2 weeks     Discharge Meds     Medication List      START taking these medications     ferrous sulfate 325 mg (65 mg elemental) tablet; Take 1 tablet (325 mg)   by mouth 2 times a day.     CHANGE how you take these medications     mirabegron 50 mg tablet extended release 24 hr 24 hr tablet; Commonly   known as: Myrbetriq; Take 1 tablet (50 mg) by mouth once daily.; What   changed: when to take this     CONTINUE taking these medications     amiodarone 200 mg tablet; Commonly known as: Pacerone; Take 1 tablet   (200 mg) by mouth once daily.   atorvastatin 40 mg tablet; Commonly known as: Lipitor; Take 1 tablet (40   mg) by mouth once daily at bedtime.   carvedilol 12.5 mg tablet; Commonly known as: Coreg; TAKE 1 TABLET BY   MOUTH TWICE A DAY   cholecalciferol 25 mcg (1,000 units) capsule; Commonly known as: Vitamin   D3; Take 1 capsule (25 mcg) by mouth once daily.   Ecotrin Low Strength 81 mg EC tablet; Generic drug: aspirin   hydroCHLOROthiazide 12.5 mg tablet; Commonly known as: Microzide; Take 1   tablet (12.5 mg) by mouth once daily.   losartan 50 mg tablet; Commonly known as: Cozaar; Take 1 tablet (50 mg)   by mouth once daily.   magnesium oxide 400 mg magnesium capsule; Take 1 capsule (400 mg) by   mouth once daily.   meclizine 12.5 mg tablet; Commonly known as: Antivert   nitroglycerin 0.4 mg SL tablet; Commonly known as: Nitrostat   PRESERVISION AREDS ORAL   propylene glycoL 0.6 % drops   white petrolatum-mineral oiL 94-3 % ophthalmic ointment; Commonly known   as: Tears Naturale PM       Test Results Pending At Discharge  Pending Labs       No current pending labs.            Hospital Course     Brian Lennon is a 89 y.o. male with a significant past medical history of CKD stage IV, history of marginal lymphoma, coronary artery disease status post CABG, hypertension nonischemic cardiomyopathy with history of low  EF, normalized after medications, history of nonsustained ventricular tachycardia, who presented to the emergency room with generalized weakness and bright blood per rectum.        He states that his symptoms started several days ago, he had noticed abruptly, bright blood per rectum with clots.  He started to become more weak as the days progressed, he saw his primary care doctor who advised him to come to the emergency room     He states that he had symptoms similar to this several years ago, he underwent a colonoscopy which he believes did not show any acute findings.     ED course: Hb 7.2, baseline 11, 1L bolus; Protonix x 1     Patient was admitted to the hospital for inpatient evaluation.  Patient was placed on clear liquid diet.  Started on IV Protonix.  Patient is quite hypertensive. given 1 unit of blood.  Underwent colonoscopy which showed diverticulosis containing blood with clots and stool in the sigmoid colon and small hemorrhoids.  Repeat hemoglobin and hematocrit stayed stable at 8.1/25.0.  The patient was started on ferrous sulfate p.o. twice daily.  He was discharged home in stable condition.           Pertinent Physical Exam At Time of Discharge  Physical Exam  HENT:      Head: Normocephalic and atraumatic.      Nose: Nose normal.   Eyes:      Extraocular Movements: Extraocular movements intact.      Pupils: Pupils are equal, round, and reactive to light.   Cardiovascular:      Rate and Rhythm: Normal rate and regular rhythm.   Pulmonary:      Effort: Pulmonary effort is normal.      Breath sounds: Normal breath sounds.   Abdominal:      General: Abdomen is flat.      Palpations: Abdomen is soft.   Musculoskeletal:         General: Normal range of motion.      Cervical back: Normal range of motion and neck supple.   Skin:     General: Skin is warm.   Neurological:      General: No focal deficit present.      Mental Status: He is alert and oriented to person, place, and time.   Psychiatric:          Mood and Affect: Mood normal.         Behavior: Behavior normal.         Thought Content: Thought content normal.         Outpatient Follow-Up  Future Appointments   Date Time Provider Department Center   7/15/2025 10:45 AM Jose M Loja MD [id number] [place]     Discharge time >30 min     Sid Abbott MD

## 2025-04-23 NOTE — PROGRESS NOTES
Occupational Therapy    Evaluation    Patient Name: Brian Lennon  MRN: [id number]  Today's Date: 4/23/2025  Time Calculation  Start Time: 1005  Stop Time: 1016  Time Calculation (min): 11 min  918/918-A    Assessment  IP OT Assessment  OT Assessment: Pt presents with decreased strength, balance, and endurance which impact his ADL and functional transfer status. Pt would benefit from skilled OT to address deficits.  Prognosis: Good  Evaluation/Treatment Tolerance: Patient tolerated treatment well  End of Session Communication: Bedside nurse  End of Session Patient Position: Up in chair, Alarm on    Plan:  Treatment Interventions: ADL retraining, Functional transfer training, Endurance training, Patient/family training  OT Frequency: 2 times per week  OT Discharge Recommendations: Low intensity level of continued care (low with family assist)  OT Recommended Transfer Status: Assist of 1  OT - OK to Discharge: Yes (ok to d/c to next level of care once medically cleared)    Subjective     Current Problem:  1. Gastrointestinal hemorrhage, unspecified gastrointestinal hemorrhage type  Colonoscopy Diagnostic    Colonoscopy Diagnostic      2. Blood loss anemia  Colonoscopy Diagnostic    Colonoscopy Diagnostic      3. Renal insufficiency        4. Elevated troponin            General:  General  Reason for Referral: ADL impairment  Referred By: PT/OT 4/22 Noah  Past Medical History Relevant to Rehab: HTN, CKD, CABG, TKR, cataract surgery, CAD, tachycardia  Family/Caregiver Present: No  Co-Treatment: PT  Co-Treatment Reason: To maximize pt function and safety  Prior to Session Communication: Bedside nurse  Patient Position Received: Bed, 3 rail up, Alarm on  General Comment: Pt is an 89 y/o M who presented to ED 3/21 for rectal bleeding and weakness. Dx: GIB. Colonoscopy indicated diverticular bleed. 4/23 Hgb 8.1.    Precautions:  Medical Precautions: Fall precautions    Pain:  Pain Assessment  Pain Assessment: 0-10  0-10  (Numeric) Pain Score: 0 - No pain    Objective     Cognition:  Overall Cognitive Status: Within Functional Limits             Home Living:  Home Living Comments: Pt reports living with wife who had a brain operation about 1 month ago and currently has around the clock care for her. Lives in a 1-level house with 1 PATRICIA. Has a walk in shower with seat. Owns w/c, walking stick, and FWW.     Prior Function:  Prior Function Comments: PTA, pt reports completing ADLs IND, family assists with iADLs and is over to assist him and wife frequently. Reports ambulating MOD I while pushing w/c for support. Denies falls in the past 3 months. Drives.    ADL:  Eating Assistance: Independent  Grooming Assistance: Stand by  Bathing Assistance: Minimal  UE Dressing Assistance: Stand by  LE Dressing Assistance: Minimal  Toileting Assistance with Device: Minimal    Activity Tolerance:  Endurance: Decreased tolerance for upright activites    Bed Mobility/Transfers:   Bed Mobility  Bed Mobility:  (Transition from supine to sit EOB with CGA and increased time.)  Transfers  Transfer:  (Sit to/from stand using FWW with CGA, cues for hand placement.)    Ambulation/Gait Training:  Functional Mobility  Functional Mobility Performed:  (Pt completed functional mobility throughout room using FWW with CGA. Cues for technique with good carryover.)    Sitting Balance:  Static Sitting Balance  Static Sitting-Comment/Number of Minutes: Good  Dynamic Sitting Balance  Dynamic Sitting-Comments: Fair    Standing Balance:  Static Standing Balance  Static Standing-Comment/Number of Minutes: Fair  Dynamic Standing Balance  Dynamic Standing-Comments: Fair    Strength:  Strength Comments: BUE strength grossly 5/5    Hand Function:  Hand Function  Gross Grasp: Functional    Extremities: RUE   RUE : Within Functional Limits and LUE   LUE: Within Functional Limits    Outcome Measures: [facility] Daily Activity  Putting on and taking off regular lower body clothing: A  little  Bathing (including washing, rinsing, drying): A little  Putting on and taking off regular upper body clothing: A little  Toileting, which includes using toilet, bedpan or urinal: A little  Taking care of personal grooming such as brushing teeth: A little  Eating Meals: None  Daily Activity - Total Score: 19                       EDUCATION:  Education  Individual(s) Educated: Patient  Education Provided: Fall precautons, Risk and benefits of OT discussed with patient or other, POC discussed and agreed upon  Patient Response to Education: Patient/Caregiver Verbalized Understanding of Information  Education Documentation  Body Mechanics, taught by Krystal Xiong OT at 4/23/2025 11:40 AM.  Learner: Patient  Readiness: Acceptance  Method: Explanation, Demonstration  Response: Verbalizes Understanding, Demonstrated Understanding, Needs Reinforcement    Goals:   Encounter Problems       Encounter Problems (Active)       OT Goals       Pt will complete all functional transfers with MOD I. (Progressing)       Start:  04/23/25    Expected End:  05/07/25            Pt will complete functional mobility household distances with MOD I. (Progressing)       Start:  04/23/25    Expected End:  05/07/25            Pt will complete LB dressing with MOD I. (Progressing)       Start:  04/23/25    Expected End:  05/07/25            Pt will improve dynamic standing balance to good during functional tasks. (Progressing)       Start:  04/23/25    Expected End:  05/07/25

## 2025-04-23 NOTE — CARE PLAN
The patient's goals for the shift include rest    The clinical goals for the shift include maintain patient safety

## 2025-04-24 ENCOUNTER — PATIENT OUTREACH (OUTPATIENT)
Dept: PRIMARY CARE | Facility: CLINIC | Age: 89
End: 2025-04-24
Payer: MEDICARE

## 2025-04-24 NOTE — PROGRESS NOTES
Discharge Facility:[facility]  Discharge Diagnosis:23GI bleed due to Diverticulosis; presented to the emergency room with generalized weakness and bright blood per rectum.  Admission Date:4.21.25  Discharge Date: 4.23.25    PCP Appointment Date:Messaged office as follow up appt was not available within 14 days    Specialist Appointment Date:   Hospital Encounter and Summary Linked: Yes  ED to Hosp-Admission (Discharged) with Sid Abbott MD; Andreas SHAW MD (04/21/2025)   See discharge assessment below for further details     Engagement  Call Start Time: 1349 (4/24/2025  1:49 PM)    Medications  Medications reviewed with patient/caregiver?: Yes (4/24/2025  1:49 PM)  Is the patient having any side effects they believe may be caused by any medication additions or changes?: No (4/24/2025  1:49 PM)  Does the patient have all medications ordered at discharge?: Yes (4/24/2025  1:49 PM)  Care Management Interventions: No intervention needed (4/24/2025  1:49 PM)  Prescription Comments: START taking these medications   o ferrous sulfate 325 mg (65 mg elemental) tablet; Take 1 tablet (325 mg)   by mouth 2 times a day.  CHANGE how you take these medications   o mirabegron 50 mg tablet extended release 24 hr 24 hr tablet; Commonly   known as: Myrbetriq; Take 1 tablet (50 mg) by mouth once daily.; What   changed: when to take this (4/24/2025  1:49 PM)  Is the patient taking all medications as directed (includes completed medication regime)?: Yes (4/24/2025  1:49 PM)  Care Management Interventions: Provided patient education (4/24/2025  1:49 PM)  Medication Comments: Verbalizes understanding of medication changes (4/24/2025  1:49 PM)    Appointments  Does the patient have a primary care provider?: Yes (4/24/2025  1:49 PM)  Care Management Interventions: -- (Messaged office as follow up appt was not available within 14 days) (4/24/2025  1:49 PM)  Has the patient kept scheduled appointments due by today?: Yes (4/24/2025  1:49  PM)    Self Management  What is the home health agency?: n/a (4/24/2025  1:49 PM)  What Durable Medical Equipment (DME) was ordered?: n/a (4/24/2025  1:49 PM)    Patient Teaching  Does the patient have access to their discharge instructions?: Yes (4/24/2025  1:49 PM)  Care Management Interventions: Reviewed instructions with patient (4/24/2025  1:49 PM)  What is the patient's perception of their health status since discharge?: Improving (4/24/2025  1:49 PM)  Is the patient/caregiver able to teach back the hierarchy of who to call/visit for symptoms/problems? PCP, Specialist, Home Health nurse, Urgent Care, ED, 911: Yes (4/24/2025  1:49 PM)  Patient/Caregiver Education Comments: See wrap up (4/24/2025  1:49 PM)     Spoke with patient. States feeling better. No further bleeding. Reviewed med changes and advised new script at his pharmacy.Messaged office as follow up appt was not available within 14 days

## 2025-05-06 ENCOUNTER — APPOINTMENT (OUTPATIENT)
Dept: GASTROENTEROLOGY | Facility: CLINIC | Age: 89
End: 2025-05-06
Payer: MEDICARE

## 2025-05-08 ENCOUNTER — PATIENT OUTREACH (OUTPATIENT)
Dept: PRIMARY CARE | Facility: CLINIC | Age: 89
End: 2025-05-08
Payer: MEDICARE

## 2025-05-08 NOTE — PROGRESS NOTES
Confirmation of at least 2 patient identifiers.   Completed telephonic follow-up with patient approximately 14 days post discharge, no PCP follow up.    Patient reports feeling: Improved   Patient has questions or concerns about medications: No   Have all prescribed medications been filled? Yes   Patient has necessary resources to manage their care? Yes   Patient has questions or concerns? Yes -  States feeling tired. Did not have a tcm post hospital follow up appt. Appt made with pcp.  Next care management follow-up approximately within one month.   Care  provided to patient.

## 2025-05-13 ENCOUNTER — APPOINTMENT (OUTPATIENT)
Dept: PRIMARY CARE | Facility: CLINIC | Age: 89
End: 2025-05-13
Payer: MEDICARE

## 2025-05-13 ENCOUNTER — APPOINTMENT (OUTPATIENT)
Dept: GASTROENTEROLOGY | Facility: CLINIC | Age: 89
End: 2025-05-13
Payer: MEDICARE

## 2025-05-13 VITALS
HEART RATE: 67 BPM | TEMPERATURE: 95.9 F | HEIGHT: 69 IN | DIASTOLIC BLOOD PRESSURE: 80 MMHG | SYSTOLIC BLOOD PRESSURE: 120 MMHG | WEIGHT: 170 LBS | BODY MASS INDEX: 25.18 KG/M2

## 2025-05-13 DIAGNOSIS — I25.10 ARTERIOSCLEROTIC CARDIOVASCULAR DISEASE: ICD-10-CM

## 2025-05-13 DIAGNOSIS — D50.0 ANEMIA DUE TO GI BLOOD LOSS: Primary | ICD-10-CM

## 2025-05-13 DIAGNOSIS — K92.2 GASTROINTESTINAL HEMORRHAGE, UNSPECIFIED GASTROINTESTINAL HEMORRHAGE TYPE: ICD-10-CM

## 2025-05-13 DIAGNOSIS — I49.3 PVC (PREMATURE VENTRICULAR CONTRACTION): ICD-10-CM

## 2025-05-13 LAB
BASOPHILS # BLD AUTO: 13 CELLS/UL (ref 0–200)
BASOPHILS NFR BLD AUTO: 0.2 %
EOSINOPHIL # BLD AUTO: 72 CELLS/UL (ref 15–500)
EOSINOPHIL NFR BLD AUTO: 1.1 %
ERYTHROCYTE [DISTWIDTH] IN BLOOD BY AUTOMATED COUNT: 13.3 % (ref 11–15)
HCT VFR BLD AUTO: 30.6 % (ref 38.5–50)
HGB BLD-MCNC: 9.4 G/DL (ref 13.2–17.1)
LYMPHOCYTES # BLD AUTO: 715 CELLS/UL (ref 850–3900)
LYMPHOCYTES NFR BLD AUTO: 11 %
MCH RBC QN AUTO: 29.9 PG (ref 27–33)
MCHC RBC AUTO-ENTMCNC: 30.7 G/DL (ref 32–36)
MCV RBC AUTO: 97.5 FL (ref 80–100)
MONOCYTES # BLD AUTO: 631 CELLS/UL (ref 200–950)
MONOCYTES NFR BLD AUTO: 9.7 %
NEUTROPHILS # BLD AUTO: 5070 CELLS/UL (ref 1500–7800)
NEUTROPHILS NFR BLD AUTO: 78 %
PLATELET # BLD AUTO: 204 THOUSAND/UL (ref 140–400)
PMV BLD REES-ECKER: 10.5 FL (ref 7.5–12.5)
RBC # BLD AUTO: 3.14 MILLION/UL (ref 4.2–5.8)
WBC # BLD AUTO: 6.5 THOUSAND/UL (ref 3.8–10.8)

## 2025-05-13 PROCEDURE — 3074F SYST BP LT 130 MM HG: CPT | Performed by: INTERNAL MEDICINE

## 2025-05-13 PROCEDURE — 3079F DIAST BP 80-89 MM HG: CPT | Performed by: INTERNAL MEDICINE

## 2025-05-13 PROCEDURE — 1036F TOBACCO NON-USER: CPT | Performed by: INTERNAL MEDICINE

## 2025-05-13 PROCEDURE — G2211 COMPLEX E/M VISIT ADD ON: HCPCS | Performed by: INTERNAL MEDICINE

## 2025-05-13 PROCEDURE — 99213 OFFICE O/P EST LOW 20 MIN: CPT | Performed by: INTERNAL MEDICINE

## 2025-05-13 PROCEDURE — 1159F MED LIST DOCD IN RCRD: CPT | Performed by: INTERNAL MEDICINE

## 2025-05-13 PROCEDURE — 1111F DSCHRG MED/CURRENT MED MERGE: CPT | Performed by: INTERNAL MEDICINE

## 2025-05-13 RX ORDER — NITROGLYCERIN 0.4 MG/1
0.4 TABLET SUBLINGUAL EVERY 5 MIN PRN
Qty: 90 TABLET | Refills: 0 | Status: SHIPPED | OUTPATIENT
Start: 2025-05-13

## 2025-05-13 ASSESSMENT — ENCOUNTER SYMPTOMS
BLOOD IN STOOL: 0
DEPRESSION: 0
ANAL BLEEDING: 0
OCCASIONAL FEELINGS OF UNSTEADINESS: 1
CONSTIPATION: 1
ARTHRALGIAS: 1
ABDOMINAL PAIN: 0
HEMATOCHEZIA: 1
LOSS OF SENSATION IN FEET: 0
FATIGUE: 1
RESPIRATORY NEGATIVE: 1
WEAKNESS: 1
WEIGHT LOSS: 0
ANOREXIA: 0

## 2025-05-13 ASSESSMENT — PATIENT HEALTH QUESTIONNAIRE - PHQ9
1. LITTLE INTEREST OR PLEASURE IN DOING THINGS: NOT AT ALL
SUM OF ALL RESPONSES TO PHQ9 QUESTIONS 1 AND 2: 0
2. FEELING DOWN, DEPRESSED OR HOPELESS: NOT AT ALL

## 2025-05-13 NOTE — PROGRESS NOTES
Subjective   Patient ID: Brian Lennon is a 89 y.o. male who presents for Follow-up (4 mo fu).  gi    GI Problem  The primary symptoms include fatigue, hematochezia and arthralgias. Primary symptoms do not include weight loss or abdominal pain. Primary symptoms comment: GIB. The illness began more than 7 days ago. The problem has been resolved.   The illness is also significant for constipation. The illness does not include anorexia. Significant associated medical issues include hemorrhoids and diverticulitis.   Anemia  Presents for follow-up (Blood loss anemia) visit. There has been no abdominal pain, anorexia, pallor or weight loss. Signs of blood loss that are present include hematochezia.     Heart Problem  This is a chronic problem. The current episode started more than 1 year ago. The problem occurs rarely. The problem has been resolved. Associated symptoms include arthralgias. Pertinent negatives include no abdominal pain, anorexia, congestion or headaches. The treatment provided significant relief  Past Medical History  Medical History[1]    Social History  Social History[2]    Family History   Family History[3]    Allergies:  RX Allergies[4]     Outpatient Medications:  Current Outpatient Medications   Medication Instructions    amiodarone (PACERONE) 200 mg, oral, Daily    aspirin (ECOTRIN LOW STRENGTH) 81 mg, Daily    atorvastatin (LIPITOR) 40 mg, oral, Nightly    carvedilol (COREG) 12.5 mg, oral, 2 times daily    cholecalciferol (VITAMIN D3) 25 mcg, oral, Daily    ferrous sulfate 325 mg, oral, 2 times daily    hydroCHLOROthiazide (MICROZIDE) 12.5 mg, oral, Daily    losartan (COZAAR) 50 mg, oral, Daily    magnesium oxide 400 mg, oral, Daily    meclizine (Antivert) 12.5 mg tablet 1 tablet, 3 times daily PRN    mirabegron (MYRBETRIQ) 50 mg, oral, Daily    nitroglycerin (NITROSTAT) 0.4 mg, Every 5 min PRN    propylene glycoL 0.6 % drops 2 drops, 2 times daily    vit A/vit C/vit E/zinc/copper (PRESERVISION  "AREDS ORAL) 1 tablet, Every 12 hours    white petrolatum-mineral oiL 94-3 % ophthalmic ointment 1 Application, Nightly        Review of Systems   Constitutional:  Positive for fatigue. Negative for weight loss.   Respiratory: Negative.     Cardiovascular:  Positive for leg swelling.   Gastrointestinal:  Positive for constipation and hematochezia. Negative for abdominal pain, anal bleeding, anorexia and blood in stool.   Musculoskeletal:  Positive for arthralgias.   Skin:  Negative for pallor.   Neurological:  Positive for weakness.         Objective       Physical Exam  Vitals reviewed.   Constitutional:       Appearance: Normal appearance. He is normal weight.   HENT:      Head: Normocephalic.   Eyes:      Conjunctiva/sclera: Conjunctivae normal.   Cardiovascular:      Rate and Rhythm: Normal rate and regular rhythm.      Pulses: Normal pulses.   Pulmonary:      Effort: Pulmonary effort is normal.      Breath sounds: Normal breath sounds.   Abdominal:      General: There is no distension.      Palpations: Abdomen is soft.      Tenderness: There is no abdominal tenderness.   Musculoskeletal:         General: Tenderness and deformity present. Normal range of motion.      Cervical back: Neck supple.      Left lower leg: Edema present.   Skin:     General: Skin is warm and dry.   Neurological:      General: No focal deficit present.   Psychiatric:         Mood and Affect: Mood normal.       /80 (BP Location: Right arm, Patient Position: Sitting, BP Cuff Size: Adult)   Pulse 67   Temp 35.5 °C (95.9 °F) (Temporal)   Ht 1.753 m (5' 9\")   Wt 77.1 kg (170 lb)   BMI 25.10 kg/m²      Assessment/Plan   Problem List Items Addressed This Visit       Arteriosclerotic cardiovascular disease    PVC (premature ventricular contraction)    Anemia due to GI blood loss - Primary    Gastrointestinal hemorrhage, unspecified gastrointestinal hemorrhage type   Last time patient was referred to emergency room, turned out to be a " diverticular bleed, colonoscopy revealed blood clots in the colon, bleeding subsided, received 1 unit of blood transfusion, now on iron, check CBC today, patient has been stressed because his wife has been having dementia and has been off and on to the hospital and required 24-hour care, patient himself has a stage IV CKD, systolic heart failure compensated, prostate cancer by history, 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  Please bring your medications next time I would like to review and if hemoglobin is improving iron will be reduced to 1 3 times a week.  Patient's care has been provided by me on a longitudinal basis with the management of the chronic problems and any acute problems arises at focal point of care.       [1] History reviewed. No pertinent past medical history.  [2]   Social History  Tobacco Use    Smoking status: Former     Current packs/day: 0.00     Types: Cigarettes     Quit date:      Years since quittin.3    Smokeless tobacco: Never   Vaping Use    Vaping status: Never Used   Substance Use Topics    Alcohol use: Not Currently    Drug use: Never   [3]   Family History  Problem Relation Name Age of Onset    Diabetes Mother     [4] No Known Allergies

## 2025-05-20 ENCOUNTER — APPOINTMENT (OUTPATIENT)
Dept: GASTROENTEROLOGY | Facility: CLINIC | Age: 89
End: 2025-05-20
Payer: MEDICARE

## 2025-05-27 ENCOUNTER — APPOINTMENT (OUTPATIENT)
Dept: GASTROENTEROLOGY | Facility: CLINIC | Age: 89
End: 2025-05-27
Payer: MEDICARE

## 2025-05-27 DIAGNOSIS — K57.90 DIVERTICULOSIS: ICD-10-CM

## 2025-05-27 DIAGNOSIS — K92.2 LOWER GI BLEED: Primary | ICD-10-CM

## 2025-05-27 DIAGNOSIS — D62 ACUTE BLOOD LOSS ANEMIA: ICD-10-CM

## 2025-05-27 PROCEDURE — 99213 OFFICE O/P EST LOW 20 MIN: CPT | Performed by: NURSE PRACTITIONER

## 2025-05-27 NOTE — PROGRESS NOTES
Assessment/Plan   Brian Lennon is a 89 y.o. male with signficant past medical history of CKD stage IV, history of marginal lymphoma, coronary artery disease status post CABG - only on ASA at home, hypertension nonischemic cardiomyopathy with history of low EF, nonsustained ventricular tachycardia being evaluated today through virtual visit for hospital follow-up for lower GI bleed with anemia 2/2 diverticular bleed.      GIB - painless hematochezia 2/2 diverticular bleed in the setting of constipation -active bleeding has resolved.    Acute normocytic anemia 2/2 blood loss - Hgb 11.1 (9/4/23) and dropped to 6.9 , currently 9.3.  s/p 1 unit prbcs.   Constipation     Plan  - Patient will continue to monitor stool color and consistency and notify GI with any concerns of overt GIB  - Continue Colace twice daily  - MiraLAX 1-2 doses daily as needed for constipation  - High-fiber diet consider taking Metamucil once daily  - Further screening colonoscopies are not recommended due to advanced age  - Stay well-hydrated  - Stay physically active  - Avoid NSAIDs  - Follow-up with GI as needed      ROXANEN Yanez-CNP    Subjective     History of Present Illness:   Brian Lennon is a 89 y.o. male with signficant past medical history of CKD stage IV, history of marginal lymphoma, coronary artery disease status post CABG - only on ASA at home, hypertension nonischemic cardiomyopathy with history of low EF, nonsustained ventricular tachycardia being evaluated today through virtual visit for hospital follow-up.  Patient was hospitalized, who presented to the emergency room with generalized weakness and bright blood per rectum.  Patient was hospitalized April 21-23, 2025 for lower GI bleed 2/2 diverticulosis.  Patient had presented to [facility] with generalized weakness and bright red blood per rectum.  He denied abdominal pain, nausea or vomiting.  Did endorse constipation.  Hemoglobin as low as 6.9, baseline 11.  Patient  was given blood transfusion and underwent colonoscopy which showed diverticulosis containing blood with clots and stool in the sigmoid colon and small hemorrhoids noted.  Repeat hemoglobin was 8.1 at discharge.  Patient was started on ferrous sulfate p.o. twice daily and discharged home in stable condition.  Most recent hemoglobin drawn 2 weeks ago was 9.4.  Patient is no longer having any rectal bleeding.  Patient is taking Colace twice a day and will take MiraLAX as needed for occasional constipation.  He is increasing fiber in his diet and drinking more water.    Colonoscopy 4/22/2025 with Dr. Rizvi indicated for rectal bleeding, anemia  Diverticulosis containing blood clots and stool in the sigmoid colon  Small hemorrhoids  The exam was otherwise normal on both forward and retroflexed views.    Colonoscopy 5/10/2017 with Dr. Dutton indicated for hx of colonic polyps   -adequate prep initially used adult colonoscope followed thereafter by pediatric colonoscope advancing ultimately to the cecum:  -scattered diverticulosis in sigmoid colon  -diminutive sessile polyp removed in sigmoid colon  -grade 2 non-inflamed IH      Review of Systems  ROS Negative unless otherwise stated above.    Past Medical History   has no past medical history on file.     Social History   reports that he quit smoking about 11 years ago. His smoking use included cigarettes. He has never used smokeless tobacco. He reports that he does not currently use alcohol. He reports that he does not use drugs.     Family History  family history includes Diabetes in his mother.     Allergies  RX Allergies[1]    Medications  Current Outpatient Medications   Medication Instructions    amiodarone (PACERONE) 200 mg, oral, Daily    aspirin (ECOTRIN LOW STRENGTH) 81 mg, Daily    atorvastatin (LIPITOR) 40 mg, oral, Nightly    carvedilol (COREG) 12.5 mg, oral, 2 times daily    cholecalciferol (VITAMIN D3) 25 mcg, oral, Daily    ferrous sulfate 325 mg, oral, 2  times daily    hydroCHLOROthiazide (MICROZIDE) 12.5 mg, oral, Daily    losartan (COZAAR) 50 mg, oral, Daily    magnesium oxide 400 mg, oral, Daily    meclizine (Antivert) 12.5 mg tablet 1 tablet, 3 times daily PRN    mirabegron (MYRBETRIQ) 50 mg, oral, Daily    nitroglycerin (NITROSTAT) 0.4 mg, sublingual, Every 5 min PRN    propylene glycoL 0.6 % drops 2 drops, 2 times daily    vit A/vit C/vit E/zinc/copper (PRESERVISION AREDS ORAL) 1 tablet, Every 12 hours    white petrolatum-mineral oiL 94-3 % ophthalmic ointment 1 Application, Nightly        Objective   Virtual visit    General: A&Ox3, NAD.  Hearing good, speech good  Engaged in conversation  Asking/answering questions appropriately  Neuro: No focal deficits.   Psych: Normal mood and affect.     Lab Results   Component Value Date    WBC 6.5 05/13/2025    WBC 7.7 04/23/2025    WBC 7.4 04/22/2025    HGB 9.4 (L) 05/13/2025    HGB 8.1 (L) 04/23/2025    HGB 8.5 (L) 04/22/2025    HCT 30.6 (L) 05/13/2025    HCT 25.0 (L) 04/23/2025    HCT 26.2 (L) 04/22/2025     05/13/2025     04/23/2025     04/22/2025     Lab Results   Component Value Date     04/22/2025    K 4.9 04/22/2025     04/22/2025    CO2 25 04/22/2025    BUN 41 (H) 04/22/2025    CREATININE 2.22 (H) 04/22/2025    GLUCOSE 80 04/22/2025    CALCIUM 8.2 (L) 04/22/2025       Lab Results   Component Value Date    ALKPHOS 47 04/21/2025    ALKPHOS 65 09/24/2024    ALKPHOS 56 07/16/2024    BILITOT 0.3 04/21/2025    BILITOT 0.4 09/24/2024    BILITOT 0.4 07/16/2024    BILIDIR 0.1 04/21/2025    BILIDIR 0.0 09/04/2023    PROT 6.7 04/21/2025    PROT 7.2 09/24/2024    PROT 7.3 07/16/2024    ALT 16 04/21/2025    ALT 17 09/24/2024    ALT 22 07/16/2024    AST 19 04/21/2025    AST 20 09/24/2024    AST 25 07/16/2024      Lab Results   Component Value Date    INR 1.0 04/21/2025            [1] No Known Allergies

## 2025-06-06 ENCOUNTER — PATIENT OUTREACH (OUTPATIENT)
Age: 89
End: 2025-06-06
Payer: MEDICARE

## 2025-06-10 ENCOUNTER — PHARMACY VISIT (OUTPATIENT)
Dept: PHARMACY | Facility: CLINIC | Age: 89
End: 2025-06-10
Payer: COMMERCIAL

## 2025-06-10 PROCEDURE — RXMED WILLOW AMBULATORY MEDICATION CHARGE

## 2025-07-10 ENCOUNTER — PATIENT OUTREACH (OUTPATIENT)
Dept: PRIMARY CARE | Facility: CLINIC | Age: 89
End: 2025-07-10
Payer: MEDICARE

## 2025-07-10 NOTE — PROGRESS NOTES
Final call. Called and spoke with patient to address any questions or concerns regarding hospitalization.   Patient reports their condition has improved   Patient encouraged to keep my contact information in case any needs arise.

## 2025-07-15 ENCOUNTER — APPOINTMENT (OUTPATIENT)
Dept: CARDIOLOGY | Facility: CLINIC | Age: 89
End: 2025-07-15
Payer: MEDICARE

## 2025-07-15 VITALS
HEIGHT: 69 IN | DIASTOLIC BLOOD PRESSURE: 60 MMHG | HEART RATE: 60 BPM | BODY MASS INDEX: 25.33 KG/M2 | SYSTOLIC BLOOD PRESSURE: 136 MMHG | WEIGHT: 171 LBS

## 2025-07-15 DIAGNOSIS — I25.10 ARTERIOSCLEROTIC CARDIOVASCULAR DISEASE: ICD-10-CM

## 2025-07-15 DIAGNOSIS — Z87.891 FORMER SMOKER: ICD-10-CM

## 2025-07-15 DIAGNOSIS — G47.33 OSA (OBSTRUCTIVE SLEEP APNEA): ICD-10-CM

## 2025-07-15 DIAGNOSIS — I10 BENIGN ESSENTIAL HYPERTENSION: ICD-10-CM

## 2025-07-15 DIAGNOSIS — D50.0 ANEMIA DUE TO GI BLOOD LOSS: ICD-10-CM

## 2025-07-15 DIAGNOSIS — I49.3 PVC (PREMATURE VENTRICULAR CONTRACTION): ICD-10-CM

## 2025-07-15 PROCEDURE — 1159F MED LIST DOCD IN RCRD: CPT | Performed by: INTERNAL MEDICINE

## 2025-07-15 PROCEDURE — 1036F TOBACCO NON-USER: CPT | Performed by: INTERNAL MEDICINE

## 2025-07-15 PROCEDURE — 3078F DIAST BP <80 MM HG: CPT | Performed by: INTERNAL MEDICINE

## 2025-07-15 PROCEDURE — 3075F SYST BP GE 130 - 139MM HG: CPT | Performed by: INTERNAL MEDICINE

## 2025-07-15 PROCEDURE — 99214 OFFICE O/P EST MOD 30 MIN: CPT | Performed by: INTERNAL MEDICINE

## 2025-07-15 RX ORDER — FERROUS SULFATE 325(65) MG
325 TABLET, DELAYED RELEASE (ENTERIC COATED) ORAL
Qty: 30 TABLET | Refills: 6 | Status: SHIPPED | OUTPATIENT
Start: 2025-07-15 | End: 2026-07-15

## 2025-07-15 ASSESSMENT — ENCOUNTER SYMPTOMS
CARDIOVASCULAR NEGATIVE: 1
NEUROLOGICAL NEGATIVE: 1
CONSTITUTIONAL NEGATIVE: 1
RESPIRATORY NEGATIVE: 1

## 2025-07-15 NOTE — PROGRESS NOTES
CARDIOLOGY OFFICE VISIT      CHIEF COMPLAINT  Chief Complaint   Patient presents with    Follow-up     6 month visit. CAD. GERARDO HTN,Lipids        HISTORY OF PRESENT ILLNESS  Brian Lennon is a 89 y.o. year old male patient with a history of CAD and remote CABG, hypertension and nonischemic cardiomyopathy and frequent PVCs.  Initial EF was 30% will repeat echo in January 2024 shows normalized LV function with moderate RV enlargement with mildly reduced RV function.  He has frequent PVCs which is improved on follow-up Holter in August 2024 that showed a PVC burden of 5.3% which is a significant reduction from May 2024.  He is currently maintained on amiodarone which he is tolerating.    ASSESSMENT AND PLAN  1.  Nonischemic cardiomyopathy: With normalized LV function as noted above and NYHA class II, we will continue with current medications.  2.  CAD status post remote CABG with no recurrent chest pain, continue lifelong aspirin.  3.  Frequent PVCs: With episode of nonsustained VT which is markedly improved amiodarone which she is followed by EP.  He is advised to continue with regular EP follow-up  4.  Hypertension: Blood pressure is well-controlled, continue with losartan and Coreg at the current dose.    Problem List Items Addressed This Visit       Arteriosclerotic cardiovascular disease    Benign essential hypertension    GERARDO (obstructive sleep apnea)    Former smoker    PVC (premature ventricular contraction)           Past Medical History  Medical History[1]    Social History  Social History[2]    Family History   Family History[3]     Allergies:  RX Allergies[4]     Outpatient Medications:  Current Outpatient Medications   Medication Instructions    amiodarone (PACERONE) 200 mg, oral, Daily    aspirin (ECOTRIN LOW STRENGTH) 81 mg, Daily    atorvastatin (LIPITOR) 40 mg, oral, Nightly    carvedilol (COREG) 12.5 mg, oral, 2 times daily    cholecalciferol (VITAMIN D3) 25 mcg, oral, Daily    hydroCHLOROthiazide  "(MICROZIDE) 12.5 mg, oral, Daily    losartan (COZAAR) 50 mg, oral, Daily    magnesium oxide 400 mg, oral, Daily    meclizine (Antivert) 12.5 mg tablet 1 tablet, 3 times daily PRN    mirabegron (MYRBETRIQ) 50 mg, oral, Daily    nitroglycerin (NITROSTAT) 0.4 mg, sublingual, Every 5 min PRN    propylene glycoL 0.6 % drops 2 drops, 2 times daily    vit A/vit C/vit E/zinc/copper (PRESERVISION AREDS ORAL) 1 tablet, Every 12 hours        Recent Lab Results:  I have personally reviewed the below labs in detail;    CBC:   Lab Results   Component Value Date    WBC 6.5 05/13/2025    RBC 3.14 (L) 05/13/2025    HGB 9.4 (L) 05/13/2025    HCT 30.6 (L) 05/13/2025     05/13/2025        CMP:    Lab Results   Component Value Date     04/22/2025    K 4.9 04/22/2025     04/22/2025    CO2 25 04/22/2025    BUN 41 (H) 04/22/2025    CREATININE 2.22 (H) 04/22/2025    GLUCOSE 80 04/22/2025    CALCIUM 8.2 (L) 04/22/2025       Magnesium:    Lab Results   Component Value Date    MG 2.30 04/22/2025       Lipid Profile:    Lab Results   Component Value Date    TRIG 62 09/13/2022    HDL 46.0 09/13/2022       TSH:    Lab Results   Component Value Date    TSH 2.33 07/16/2024       BNP:   Lab Results   Component Value Date     (H) 01/16/2024        PT/INR:    Lab Results   Component Value Date    PROTIME 10.5 04/21/2025    INR 1.0 04/21/2025       HgBA1c:    No results found for: \"HGBA1C\"    BMP:  Lab Results   Component Value Date     04/22/2025     04/21/2025     09/24/2024    K 4.9 04/22/2025    K 5.3 04/21/2025    K 4.8 09/24/2024     04/22/2025     04/21/2025     09/24/2024    CO2 25 04/22/2025    CO2 25 04/21/2025    CO2 28 09/24/2024    BUN 41 (H) 04/22/2025    BUN 57 (H) 04/21/2025    BUN 41 (H) 09/24/2024    CREATININE 2.22 (H) 04/22/2025    CREATININE 2.79 (H) 04/21/2025    CREATININE 2.26 (H) 09/24/2024       CBC:  Lab Results   Component Value Date    WBC 6.5 05/13/2025    WBC " 7.7 04/23/2025    WBC 7.4 04/22/2025    WBC 7.0 04/21/2025    RBC 3.14 (L) 05/13/2025    RBC 2.72 (L) 04/23/2025    RBC 2.81 (L) 04/22/2025    RBC 2.39 (L) 04/21/2025    HGB 9.4 (L) 05/13/2025    HGB 8.1 (L) 04/23/2025    HGB 8.5 (L) 04/22/2025    HGB 6.9 (L) 04/21/2025    HCT 30.6 (L) 05/13/2025    HCT 25.0 (L) 04/23/2025    HCT 26.2 (L) 04/22/2025    HCT 21.5 (L) 04/21/2025    MCV 97.5 05/13/2025    MCV 92 04/23/2025    MCV 93 04/22/2025    MCV 95 04/21/2025    MCH 29.9 05/13/2025    MCH 29.8 04/23/2025    MCH 30.2 04/22/2025    MCH 30.1 04/21/2025    MCHC 30.7 (L) 05/13/2025    MCHC 32.4 04/23/2025    MCHC 32.4 04/22/2025    MCHC 31.9 (L) 04/21/2025    RDW 13.3 05/13/2025    RDW 14.6 (H) 04/23/2025    RDW 14.5 04/22/2025    RDW 13.9 04/21/2025     05/13/2025     04/23/2025     04/22/2025     04/21/2025    MPV 10.5 05/13/2025       Cardiac Enzymes:    Lab Results   Component Value Date    TROPHS 52 (HH) 04/21/2025    TROPHS 35 (H) 09/04/2023    TROPHS 33 (H) 09/04/2023       Hepatic Function Panel:    Lab Results   Component Value Date    ALKPHOS 47 04/21/2025    ALT 16 04/21/2025    AST 19 04/21/2025    PROT 6.7 04/21/2025    BILITOT 0.3 04/21/2025    BILIDIR 0.1 04/21/2025         REVIEW OF SYSTEMS  Review of Systems   Constitutional: Negative.   Cardiovascular: Negative.    Respiratory: Negative.     Neurological: Negative.    All other systems reviewed and are negative.      VITALS  Vitals:    07/15/25 1016   BP: 136/60   Pulse: 60     Wt Readings from Last 4 Encounters:   07/15/25 77.6 kg (171 lb)   05/13/25 77.1 kg (170 lb)   04/21/25 75 kg (165 lb 5.5 oz)   04/21/25 77.1 kg (170 lb)       PHYSICAL EXAM  Constitutional:       Appearance: Healthy appearance.   Eyes:      Pupils: Pupils are equal, round, and reactive to light.   Pulmonary:      Effort: Pulmonary effort is normal.      Breath sounds: Normal breath sounds.   Cardiovascular:      PMI at left midclavicular line. Normal  rate. Regular rhythm.      Murmurs: There is no murmur.      No gallop.  No click. No rub.   Pulses:     Intact distal pulses.   Musculoskeletal: Normal range of motion.      Cervical back: Normal range of motion. Skin:     General: Skin is warm and dry.   Neurological:      General: No focal deficit present.      Mental Status: Alert and oriented to person, place and time.           IDuran LPN       am scribing for, and in the presence of ,MD AMBER Valiente, Dr. Jose M Loja MD  , personally performed the services described in the documentation as scribed by Duran Kevin LPN  \ in my presence, and confirm it is both accurate and complete.      Dr. Jose M Loja MD  Thank you for allowing me to participate in the care of this patient. Please do not hesitate to contact me with any further questions or concerns.         [1] History reviewed. No pertinent past medical history.  [2]   Social History  Tobacco Use    Smoking status: Former     Current packs/day: 0.00     Types: Cigarettes     Quit date:      Years since quittin.5    Smokeless tobacco: Never   Vaping Use    Vaping status: Never Used   Substance Use Topics    Alcohol use: Not Currently    Drug use: Never   [3]   Family History  Problem Relation Name Age of Onset    Diabetes Mother     [4] No Known Allergies

## 2025-07-22 ENCOUNTER — APPOINTMENT (OUTPATIENT)
Dept: CARDIOLOGY | Facility: CLINIC | Age: 89
End: 2025-07-22
Payer: MEDICARE

## 2025-07-24 DIAGNOSIS — I10 BENIGN ESSENTIAL HYPERTENSION: ICD-10-CM

## 2025-07-24 RX ORDER — CARVEDILOL 12.5 MG/1
12.5 TABLET ORAL 2 TIMES DAILY
Qty: 180 TABLET | Refills: 1 | Status: SHIPPED | OUTPATIENT
Start: 2025-07-24

## 2025-08-05 ENCOUNTER — OFFICE VISIT (OUTPATIENT)
Dept: PRIMARY CARE | Facility: CLINIC | Age: 89
End: 2025-08-05
Payer: MEDICARE

## 2025-08-05 VITALS
WEIGHT: 170 LBS | SYSTOLIC BLOOD PRESSURE: 140 MMHG | BODY MASS INDEX: 25.18 KG/M2 | HEIGHT: 69 IN | TEMPERATURE: 96.5 F | HEART RATE: 64 BPM | DIASTOLIC BLOOD PRESSURE: 80 MMHG

## 2025-08-05 DIAGNOSIS — I25.10 ARTERIOSCLEROTIC CARDIOVASCULAR DISEASE: Primary | ICD-10-CM

## 2025-08-05 DIAGNOSIS — I50.42 CHRONIC COMBINED SYSTOLIC AND DIASTOLIC CONGESTIVE HEART FAILURE: ICD-10-CM

## 2025-08-05 DIAGNOSIS — D47.2 MGUS (MONOCLONAL GAMMOPATHY OF UNKNOWN SIGNIFICANCE): ICD-10-CM

## 2025-08-05 DIAGNOSIS — D50.0 ANEMIA DUE TO GI BLOOD LOSS: ICD-10-CM

## 2025-08-05 DIAGNOSIS — N18.4 CHRONIC RENAL DISEASE, STAGE IV (MULTI): ICD-10-CM

## 2025-08-05 DIAGNOSIS — I10 BENIGN ESSENTIAL HYPERTENSION: ICD-10-CM

## 2025-08-05 PROCEDURE — 3077F SYST BP >= 140 MM HG: CPT | Performed by: INTERNAL MEDICINE

## 2025-08-05 PROCEDURE — G2211 COMPLEX E/M VISIT ADD ON: HCPCS | Performed by: INTERNAL MEDICINE

## 2025-08-05 PROCEDURE — 1159F MED LIST DOCD IN RCRD: CPT | Performed by: INTERNAL MEDICINE

## 2025-08-05 PROCEDURE — 3079F DIAST BP 80-89 MM HG: CPT | Performed by: INTERNAL MEDICINE

## 2025-08-05 PROCEDURE — 99213 OFFICE O/P EST LOW 20 MIN: CPT | Performed by: INTERNAL MEDICINE

## 2025-08-05 RX ORDER — MIRABEGRON 50 MG/1
50 TABLET, FILM COATED, EXTENDED RELEASE ORAL DAILY
Qty: 30 TABLET | Refills: 6 | Status: SHIPPED | OUTPATIENT
Start: 2025-08-05

## 2025-08-05 RX ORDER — FERROUS SULFATE 325(65) MG
325 TABLET, DELAYED RELEASE (ENTERIC COATED) ORAL
Qty: 30 TABLET | Refills: 6 | Status: SHIPPED | OUTPATIENT
Start: 2025-08-05 | End: 2026-08-05

## 2025-08-05 RX ORDER — FUROSEMIDE 40 MG/1
40 TABLET ORAL DAILY
COMMUNITY

## 2025-08-05 ASSESSMENT — ENCOUNTER SYMPTOMS
FATIGUE: 1
OCCASIONAL FEELINGS OF UNSTEADINESS: 0
ORTHOPNEA: 0
BLOOD IN STOOL: 0
EDEMA: 0
VOMITING: 0
SHORTNESS OF BREATH: 0
RESPIRATORY NEGATIVE: 1
CHEST PRESSURE: 0
DIZZINESS: 0
ABDOMINAL PAIN: 0
ARTHRALGIAS: 1
PALPITATIONS: 0

## 2025-08-05 ASSESSMENT — PATIENT HEALTH QUESTIONNAIRE - PHQ9
SUM OF ALL RESPONSES TO PHQ9 QUESTIONS 1 AND 2: 0
2. FEELING DOWN, DEPRESSED OR HOPELESS: NOT AT ALL
1. LITTLE INTEREST OR PLEASURE IN DOING THINGS: NOT AT ALL

## 2025-08-05 ASSESSMENT — COLUMBIA-SUICIDE SEVERITY RATING SCALE - C-SSRS
2. HAVE YOU ACTUALLY HAD ANY THOUGHTS OF KILLING YOURSELF?: NO
1. IN THE PAST MONTH, HAVE YOU WISHED YOU WERE DEAD OR WISHED YOU COULD GO TO SLEEP AND NOT WAKE UP?: NO
6. HAVE YOU EVER DONE ANYTHING, STARTED TO DO ANYTHING, OR PREPARED TO DO ANYTHING TO END YOUR LIFE?: NO

## 2025-08-05 NOTE — PROGRESS NOTES
Subjective   Patient ID: Brian Lennon is a 89 y.o. male who presents for Follow-up.  Congestive Heart Failure  Presents for follow-up visit. Associated symptoms include fatigue. Pertinent negatives include no abdominal pain, chest pain, chest pressure, edema, orthopnea, palpitations or shortness of breath. The symptoms have been stable. Compliance with medications is %.     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 The current episode started more than 1 year ago. The problem occurs intermittently. The problem has been waxing and waning. Associated symptoms include arthralgias. Pertinent negatives include no abdominal pain, coughing, fatigue, fever, numbness, rash or vomiting. The treatment provided no relief.     Hypertension  This is a chronic problem. The current episode started more than 1 year ago. The problem has been resolved since onset. The problem is controlled. Pertinent negatives include no anxiety or headaches. Past treatments include beta blockers. The current treatment provides significant improvement. Hypertensive end-organ damage includes kidney disease. Identifiable causes of hypertension include chronic renal disease.   Heart Problem  This is a chronic problem. The current episode started more than 1 year ago. The problem occurs rarely. The problem has been resolved. Associated symptoms include arthralgias. Pertinent negatives include no abdominal pain, anorexia, congestion or headaches. The treatment provided significant relief  Past Medical History  Medical History[1]    Social History  Social History[2]    Family History   Family History[3]    Allergies:  RX Allergies[4]     Outpatient Medications:  Current Outpatient Medications   Medication Instructions    amiodarone (PACERONE) 200 mg, oral, Daily    aspirin (ECOTRIN LOW STRENGTH) 81 mg, Daily    atorvastatin (LIPITOR) 40 mg, oral, Nightly    carvedilol (COREG) 12.5 mg, oral, 2 times  "daily    cholecalciferol (VITAMIN D3) 25 mcg, oral, Daily    ferrous sulfate 325 (65 Fe) mg EC tablet 1 tablet, oral, Daily with breakfast, Do not crush, chew, or split.    furosemide (LASIX) 40 mg, oral, Daily    losartan (COZAAR) 50 mg, oral, Daily    magnesium oxide 400 mg, oral, Daily    meclizine (Antivert) 12.5 mg tablet 1 tablet, 3 times daily PRN    mirabegron (MYRBETRIQ) 50 mg, oral, Daily    nitroglycerin (NITROSTAT) 0.4 mg, sublingual, Every 5 min PRN    propylene glycoL 0.6 % drops 2 drops, 2 times daily    vit A/vit C/vit E/zinc/copper (PRESERVISION AREDS ORAL) 1 tablet, Every 12 hours        Review of Systems   Constitutional:  Positive for fatigue.   Respiratory: Negative.  Negative for shortness of breath.    Cardiovascular:  Negative for chest pain and palpitations.   Gastrointestinal:  Negative for abdominal pain, blood in stool and vomiting.   Musculoskeletal:  Positive for arthralgias.   Neurological:  Negative for dizziness.         Objective       Physical Exam  Vitals reviewed.   Constitutional:       Appearance: Normal appearance. He is normal weight.   HENT:      Head: Normocephalic.     Eyes:      Conjunctiva/sclera: Conjunctivae normal.       Cardiovascular:      Rate and Rhythm: Normal rate and regular rhythm.      Heart sounds: No murmur heard.     No gallop.   Pulmonary:      Effort: Pulmonary effort is normal.      Breath sounds: Normal breath sounds.   Abdominal:      Palpations: Abdomen is soft.     Musculoskeletal:         General: Tenderness and deformity present.      Cervical back: Neck supple.     Skin:     General: Skin is warm and dry.     Neurological:      General: No focal deficit present.     Psychiatric:         Behavior: Behavior normal.       /80 (BP Location: Left arm, Patient Position: Sitting, BP Cuff Size: Adult)   Pulse 64   Temp 35.8 °C (96.5 °F) (Temporal)   Ht 1.753 m (5' 9\")   Wt 77.1 kg (170 lb)   BMI 25.10 kg/m²      Assessment/Plan   Problem List " Items Addressed This Visit       Arteriosclerotic cardiovascular disease - Primary    Benign essential hypertension    Relevant Medications    furosemide (Lasix) 40 mg tablet    MGUS (monoclonal gammopathy of unknown significance)    Chronic combined systolic and diastolic congestive heart failure    Relevant Medications    furosemide (Lasix) 40 mg tablet    Chronic renal disease, stage IV (Multi)    Relevant Medications    furosemide (Lasix) 40 mg tablet   He continued to have a stage IV CKD, his wife is confined to the nursing home, he was accompanied by sister, sister oversees his care, he has a longstanding history of CAD CABG, he has been on amiodarone, he has MGUS, he was just seen by hematology, chronic anemia remains, he has a sleep apnea asbestosis, renal functions are not deteriorating but not improving also, went through the medications, HCTZ will be taken away, carvedilol, losartan, amiodarone to be continued, fresh list of medication given to the sister who oversees his care as listed.  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  He has been getting slow and sluggish, he does not have any back pain, he is able to ambulate, he is not feeling good because his wife is confined for long-term care with home he used to stay for several years, unfortunately he will be alone and he has a good family support I told him, longitudinal care has been provided to this patient on ongoing basis, any change in the condition needs to be notified to us, heart failure admission risk is not high, his ejection fraction needs to be checked, follow-up in 3 months.           [1] History reviewed. No pertinent past medical history.  [2]   Social History  Tobacco Use    Smoking status: Former     Current packs/day: 0.00     Types: Cigarettes     Quit date:      Years since quittin.6    Smokeless tobacco: Never    Vaping Use    Vaping status: Never Used   Substance Use Topics    Alcohol use: Not Currently    Drug use: Never   [3]   Family History  Problem Relation Name Age of Onset    Diabetes Mother     [4] No Known Allergies

## 2025-08-10 DIAGNOSIS — I25.10 ARTERIOSCLEROTIC CARDIOVASCULAR DISEASE: ICD-10-CM

## 2025-08-10 DIAGNOSIS — I49.3 PVC (PREMATURE VENTRICULAR CONTRACTION): ICD-10-CM

## 2025-08-10 RX ORDER — NITROGLYCERIN 0.4 MG/1
0.4 TABLET SUBLINGUAL EVERY 5 MIN PRN
Qty: 100 TABLET | Refills: 3 | Status: SHIPPED | OUTPATIENT
Start: 2025-08-10

## 2025-08-14 ENCOUNTER — APPOINTMENT (OUTPATIENT)
Dept: PRIMARY CARE | Facility: CLINIC | Age: 89
End: 2025-08-14
Payer: MEDICARE

## 2025-08-19 ENCOUNTER — APPOINTMENT (OUTPATIENT)
Dept: PRIMARY CARE | Facility: CLINIC | Age: 89
End: 2025-08-19
Payer: MEDICARE

## 2025-11-05 ENCOUNTER — APPOINTMENT (OUTPATIENT)
Dept: PRIMARY CARE | Facility: CLINIC | Age: 89
End: 2025-11-05
Payer: MEDICARE

## 2026-01-27 ENCOUNTER — APPOINTMENT (OUTPATIENT)
Dept: CARDIOLOGY | Facility: CLINIC | Age: 89
End: 2026-01-27
Payer: MEDICARE